# Patient Record
Sex: MALE | Race: BLACK OR AFRICAN AMERICAN | NOT HISPANIC OR LATINO | Employment: FULL TIME | URBAN - METROPOLITAN AREA
[De-identification: names, ages, dates, MRNs, and addresses within clinical notes are randomized per-mention and may not be internally consistent; named-entity substitution may affect disease eponyms.]

---

## 2017-06-14 ENCOUNTER — HOSPITAL ENCOUNTER (EMERGENCY)
Facility: HOSPITAL | Age: 62
Discharge: HOME | End: 2017-06-14
Attending: EMERGENCY MEDICINE

## 2017-06-14 VITALS
BODY MASS INDEX: 32.7 KG/M2 | WEIGHT: 263 LBS | TEMPERATURE: 98 F | HEIGHT: 75 IN | OXYGEN SATURATION: 98 % | HEART RATE: 67 BPM | DIASTOLIC BLOOD PRESSURE: 83 MMHG | RESPIRATION RATE: 20 BRPM | SYSTOLIC BLOOD PRESSURE: 138 MMHG

## 2017-06-14 DIAGNOSIS — K12.2 UVULITIS: Primary | ICD-10-CM

## 2017-06-14 PROCEDURE — 96375 TX/PRO/DX INJ NEW DRUG ADDON: CPT

## 2017-06-14 PROCEDURE — 96365 THER/PROPH/DIAG IV INF INIT: CPT

## 2017-06-14 PROCEDURE — 25000003 PHARM REV CODE 250: Performed by: EMERGENCY MEDICINE

## 2017-06-14 PROCEDURE — 99284 EMERGENCY DEPT VISIT MOD MDM: CPT | Mod: 25

## 2017-06-14 PROCEDURE — 63600175 PHARM REV CODE 636 W HCPCS: Performed by: EMERGENCY MEDICINE

## 2017-06-14 RX ORDER — HYDROCHLOROTHIAZIDE 25 MG/1
25 TABLET ORAL DAILY
Qty: 30 TABLET | Refills: 0 | Status: SHIPPED | OUTPATIENT
Start: 2017-06-14 | End: 2018-11-25

## 2017-06-14 RX ORDER — CEPHALEXIN 250 MG/1
250 CAPSULE ORAL 4 TIMES DAILY
Qty: 28 CAPSULE | Refills: 0 | Status: SHIPPED | OUTPATIENT
Start: 2017-06-14 | End: 2017-06-21

## 2017-06-14 RX ORDER — CEFTRIAXONE 1 G/50ML
1 INJECTION, SOLUTION INTRAVENOUS
Status: COMPLETED | OUTPATIENT
Start: 2017-06-14 | End: 2017-06-14

## 2017-06-14 RX ORDER — DIPHENHYDRAMINE HYDROCHLORIDE 50 MG/ML
50 INJECTION INTRAMUSCULAR; INTRAVENOUS
Status: COMPLETED | OUTPATIENT
Start: 2017-06-14 | End: 2017-06-14

## 2017-06-14 RX ORDER — PREDNISONE 20 MG/1
20 TABLET ORAL DAILY
Qty: 5 TABLET | Refills: 0 | Status: SHIPPED | OUTPATIENT
Start: 2017-06-14 | End: 2017-06-19

## 2017-06-14 RX ORDER — DIPHENHYDRAMINE HCL 25 MG
25 CAPSULE ORAL EVERY 6 HOURS PRN
Qty: 12 EACH | Refills: 0 | Status: SHIPPED | OUTPATIENT
Start: 2017-06-14 | End: 2017-06-17

## 2017-06-14 RX ORDER — LOVASTATIN 20 MG/1
20 TABLET ORAL NIGHTLY
COMMUNITY
End: 2022-07-26 | Stop reason: CLARIF

## 2017-06-14 RX ORDER — AMLODIPINE BESYLATE 10 MG/1
10 TABLET ORAL DAILY
COMMUNITY
End: 2022-07-26 | Stop reason: CLARIF

## 2017-06-14 RX ORDER — LISINOPRIL AND HYDROCHLOROTHIAZIDE 20; 25 MG/1; MG/1
1 TABLET ORAL DAILY
COMMUNITY
End: 2017-06-14 | Stop reason: ALTCHOICE

## 2017-06-14 RX ORDER — METHYLPREDNISOLONE SOD SUCC 125 MG
125 VIAL (EA) INJECTION
Status: COMPLETED | OUTPATIENT
Start: 2017-06-14 | End: 2017-06-14

## 2017-06-14 RX ORDER — FAMOTIDINE 10 MG/ML
20 INJECTION INTRAVENOUS
Status: COMPLETED | OUTPATIENT
Start: 2017-06-14 | End: 2017-06-14

## 2017-06-14 RX ADMIN — FAMOTIDINE 20 MG: 10 INJECTION, SOLUTION INTRAVENOUS at 02:06

## 2017-06-14 RX ADMIN — DIPHENHYDRAMINE HYDROCHLORIDE 50 MG: 50 INJECTION, SOLUTION INTRAMUSCULAR; INTRAVENOUS at 02:06

## 2017-06-14 RX ADMIN — METHYLPREDNISOLONE SODIUM SUCCINATE 125 MG: 125 INJECTION, POWDER, FOR SOLUTION INTRAMUSCULAR; INTRAVENOUS at 02:06

## 2017-06-14 RX ADMIN — CEFTRIAXONE 1 G: 1 INJECTION, SOLUTION INTRAVENOUS at 03:06

## 2017-06-14 NOTE — ED NOTES
Patient identifiers for Willi Diaz checked and correct.  LOC: Patient is awake, alert, and aware of environment with an appropriate affect. Patient is oriented x 3 and speaking appropriately. Pt c/o swelling to throat with difficulty swallowing. Pt reported s/s started today. Pt reports he takes lisinopril.   APPEARANCE: Patient resting comfortably and in no acute distress. Patient is clean and well groomed, patient's clothing is properly fastened.  SKIN: The skin is warm and dry. Patient has normal skin turgor and moist mucus membrances. Skin is intact; no bruising or breakdown noted.  MUSCULOSKELETAL: Patient is moving all extremities well, no obvious deformities noted. Pulses intact.   RESPIRATORY: Airway is open and patent. Respirations are spontaneous and non-labored with normal effort and rate. Pt placed on continuous pulse ox.  CARDIAC: Patient has a normal rate and rhythm. No peripheral edema noted. Capillary refill < 3 seconds. Pt placed on continuous cardiac monitor.  ABDOMEN: No distention noted. Bowel sounds active in all 4 quadrants. Soft and non-tender upon palpation.  NEUROLOGICAL: PERRL. Facial expression is symmetrical. Hand grasps are equal bilaterally. Normal sensation in all extremities when touched with finger.  Allergies reported:   Review of patient's allergies indicates:   Allergen Reactions    Pcn [penicillins] Itching and Swelling     Bed locked, lowest position. Call light within easy reach. Side rails up x2.

## 2017-06-14 NOTE — DISCHARGE INSTRUCTIONS
It is unclear if you have an allergy to your lisinopril called ACE - Inhibitor Angioedema or just infection of the uvula call uvulitis.  You need to stop your lisinopril and follow up with your regular doctor in the next few days for further adjustment of your blood pressure medicine.

## 2017-06-14 NOTE — ED PROVIDER NOTES
Encounter Date: 6/14/2017       History     Chief Complaint   Patient presents with    Sore Throat     pt c/o sore throat and swelling that began yesterday      Review of patient's allergies indicates:   Allergen Reactions    Pcn [penicillins] Itching and Swelling     Patient is a 61-year-old male with a past medical history of hypertension who presents to emergency department for evaluation of a mild sore throat swelling the posterior throat.  He feels like his uvula is swollen.  He has no difficulty breathing or difficulty swallowing.  He has no facial swelling or tongue swelling.  He does take an ACE inhibitor but has no history of ACE inhibitor angioedema.  He has no diffuse rash itching wheezing cough shortness of breath nausea vomiting or abdominal pain.          Past Medical History:   Diagnosis Date    Hypertension      History reviewed. No pertinent surgical history.  History reviewed. No pertinent family history.  Social History   Substance Use Topics    Smoking status: Never Smoker    Smokeless tobacco: Not on file    Alcohol use 18.0 oz/week     30 Cans of beer per week     Review of Systems   Constitutional: Negative for fatigue and fever.   HENT: Positive for sore throat. Negative for postnasal drip, rhinorrhea, sinus pressure, sneezing, tinnitus, trouble swallowing and voice change.    Respiratory: Negative for shortness of breath and wheezing.    Cardiovascular: Negative for chest pain.   Gastrointestinal: Negative for abdominal pain, nausea and rectal pain.   Genitourinary: Negative for dysuria and flank pain.   Neurological: Negative for weakness, numbness and headaches.       Physical Exam     Initial Vitals [06/14/17 0123]   BP Pulse Resp Temp SpO2   (!) 155/84 78 16 97.9 °F (36.6 °C) 98 %     Physical Exam    Nursing note and vitals reviewed.  Constitutional: He appears well-developed and well-nourished. No distress.   HENT:   Head: Normocephalic and atraumatic.   Right Ear: External ear  normal.   Left Ear: External ear normal.   Uvula is erythematous and slightly swollen.  The soft pillars are slightly erythematous and swollen.   Eyes: Conjunctivae and EOM are normal. Pupils are equal, round, and reactive to light.   Neck: Normal range of motion. Neck supple.   Normal egophony.  No meningismus.   Cardiovascular: Normal rate, regular rhythm and normal heart sounds. Exam reveals no gallop and no friction rub.    No murmur heard.  Pulmonary/Chest: Breath sounds normal. No stridor. He has no wheezes. He has no rhonchi. He has no rales.   Abdominal: Soft. He exhibits no distension. There is no tenderness. There is no rebound.   Neurological: He is alert and oriented to person, place, and time. He has normal strength.   Skin: Capillary refill takes less than 2 seconds. No rash noted.   Psychiatric: He has a normal mood and affect.         ED Course   Procedures  Labs Reviewed - No data to display          Medical Decision Making:   I think this is uvulitis because the patient has an erythematous swollen uvula w/ mild sore throat.  I gave the patient Benadryl and prednisone as well as Rocephin.  I will start him on Keflex.    However, the patient is on lisinopril hydrochlorothiazide and I believe he will need to stop the lisinopril in case this is ACE inhibitor angioedema.  His symptoms improved while he was in the emergency department and I feel that he is stable for discharge.  Follow-up with his primary care physician.                   ED Course     Clinical Impression:   The encounter diagnosis was Uvulitis.          Russell Blackmon MD  06/14/17 0898

## 2018-11-25 ENCOUNTER — HOSPITAL ENCOUNTER (EMERGENCY)
Facility: HOSPITAL | Age: 63
Discharge: HOME | End: 2018-11-25
Attending: EMERGENCY MEDICINE
Payer: COMMERCIAL

## 2018-11-25 VITALS
OXYGEN SATURATION: 98 % | SYSTOLIC BLOOD PRESSURE: 130 MMHG | HEART RATE: 82 BPM | WEIGHT: 265 LBS | HEIGHT: 75 IN | RESPIRATION RATE: 16 BRPM | DIASTOLIC BLOOD PRESSURE: 68 MMHG | BODY MASS INDEX: 32.95 KG/M2 | TEMPERATURE: 99 F

## 2018-11-25 DIAGNOSIS — M79.89 SWELLING OF RIGHT FOOT: ICD-10-CM

## 2018-11-25 PROCEDURE — 99284 EMERGENCY DEPT VISIT MOD MDM: CPT | Mod: 25

## 2018-11-25 RX ORDER — HYDROCODONE BITARTRATE AND ACETAMINOPHEN 5; 325 MG/1; MG/1
1 TABLET ORAL EVERY 6 HOURS PRN
Qty: 8 TABLET | Refills: 0 | Status: SHIPPED | OUTPATIENT
Start: 2018-11-25 | End: 2022-07-26

## 2018-11-25 RX ORDER — LOSARTAN POTASSIUM AND HYDROCHLOROTHIAZIDE 12.5; 5 MG/1; MG/1
1 TABLET ORAL DAILY
COMMUNITY

## 2018-11-25 NOTE — ED PROVIDER NOTES
"Encounter Date: 11/25/2018    SCRIBE #1 NOTE: I, Cyndi Fitch, am scribing for, and in the presence of, Dr. Johnny Lehman MD.       History     Chief Complaint   Patient presents with    rt. foot pain / swelling     x 5 days / swelling and pain / denies injury        Time seen by provider: 8:14 AM on 11/25/2018    Willi Diaz is a 63 y.o. male with PMHx of HTN who presents to the ED with complaints of bilateral foot swelling and right foot pain for the past x5 days. Pain "comes and goes" and has worsened since yesterday. Patient has been taking Advil with little to no relief to pain. He denies any recent injuries or trauma. Patient mentions being diagnosed with gout in his right knee in the past. He states fluid was unable to be found at the time of draining. Patient currently has no other medical concerns or complaints at this moment. He denies onset of any other new symptoms. No pertinent SHx on file. Penicillin allergy noted.       The history is provided by the patient.     Review of patient's allergies indicates:   Allergen Reactions    Pcn [penicillins] Itching and Swelling     Past Medical History:   Diagnosis Date    Hypertension      History reviewed. No pertinent surgical history.  History reviewed. No pertinent family history.  Social History     Tobacco Use    Smoking status: Never Smoker   Substance Use Topics    Alcohol use: Yes     Alcohol/week: 18.0 oz     Types: 30 Cans of beer per week    Drug use: Not on file     Review of Systems   Constitutional: Negative for fever.   HENT: Negative for congestion and sinus pressure.    Respiratory: Negative for cough.    Cardiovascular: Negative for chest pain.   Gastrointestinal: Negative for nausea and vomiting.   Musculoskeletal: Negative for back pain, gait problem and neck pain.        + right foot pain  + bilateral foot swelling   Skin: Negative for color change, rash and wound.   Neurological: Negative for dizziness, weakness and " numbness.   Hematological: Does not bruise/bleed easily.   Psychiatric/Behavioral: The patient is not nervous/anxious.        Physical Exam     Initial Vitals [11/25/18 0806]   BP Pulse Resp Temp SpO2   130/68 82 16 98.5 °F (36.9 °C) 98 %      MAP       --         Physical Exam    Nursing note and vitals reviewed.  Constitutional: He appears well-developed and well-nourished. He is not diaphoretic. No distress.   HENT:   Head: Normocephalic and atraumatic.   Mouth/Throat: Oropharynx is clear and moist.   Eyes: Conjunctivae are normal.   Neck: Neck supple.   Cardiovascular: Normal rate, regular rhythm, normal heart sounds and intact distal pulses. Exam reveals no gallop and no friction rub.    No murmur heard.  Pulses:       Dorsalis pedis pulses are 2+ on the right side, and 2+ on the left side.        Posterior tibial pulses are 2+ on the right side, and 2+ on the left side.   Pulmonary/Chest: Breath sounds normal. No respiratory distress.   Musculoskeletal: Normal range of motion. He exhibits edema and tenderness.        Right foot: There is tenderness and swelling. There is normal range of motion.        Left foot: There is swelling. There is normal range of motion and no tenderness.   + trace pitting edema. 2+ pedal pulses. Some leg asymmetry, right greater than left. 5/5 strength in BLE. No calf pain noted. Minimal TTP on dorsum of right foot. No increased warmth or erythema noted. No foreign body evident to BLE. No pain to planar aspect of feet. Full ROM with flexion and extension.    Neurological: He is alert and oriented to person, place, and time. He has normal strength. No sensory deficit.   Skin: No rash noted. No erythema.         ED Course   Procedures  Labs Reviewed - No data to display       Imaging Results          US Lower Extremity Veins Right (Final result)  Result time 11/25/18 09:58:30    Final result by Johnny Landry MD (11/25/18 09:58:30)                 Impression:      No evidence of  deep venous thrombosis in the right lower extremity.      Electronically signed by: Johnny Landry  Date:    11/25/2018  Time:    09:58             Narrative:    EXAMINATION:  US LOWER EXTREMITY VEINS RIGHT    CLINICAL HISTORY:  Leg pain;    TECHNIQUE:  Duplex and color flow Doppler evaluation and graded compression of the right lower extremity veins was performed.    COMPARISON:  None    FINDINGS:  Right thigh veins: The common femoral, femoral, popliteal, upper greater saphenous, and deep femoral veins are patent and free of thrombus. The veins are normally compressible and have normal phasic flow and augmentation response.    Right calf veins: The visualized calf veins are patent.    Contralateral CFV: The contralateral (left) common femoral vein is patent and free of thrombus.    Miscellaneous: Nonspecific subcutaneous soft tissue edema in the right calf.                               X-Ray Foot Complete Right (Final result)  Result time 11/25/18 10:00:59    Final result by Johnny Landry MD (11/25/18 10:00:59)                 Narrative:    EXAMINATION:  XR FOOT COMPLETE 3 VIEW RIGHT    CLINICAL HISTORY:  . Other specified soft tissue disorders    TECHNIQUE:  AP, lateral, and oblique views of the right foot were performed.    COMPARISON:  None    FINDINGS:  No acute fracture.  Hallux valgus.  Plantar and dorsal calcaneal spurs.  Mild degenerative changes 1st MTP and midfoot.  No radiopaque retained foreign body.  Mild dorsal foot soft tissue swelling.      Electronically signed by: Johnny Landry  Date:    11/25/2018  Time:    10:00                               Medical Decision Making:   History:   Old Medical Records: I decided to obtain old medical records.  Clinical Tests:   Radiological Study: Ordered and Reviewed            Scribe Attestation:   Scribe #1: I performed the above scribed service and the documentation accurately describes the services I performed. I attest to the accuracy of the  note.      I, Dr. Johnny Lehman, personally performed the services described in this documentation. All medical record entries made by the scribe were at my direction and in my presence.  I have reviewed the chart and agree that the record reflects my personal performance and is accurate and complete. Johnny Lehman MD.  9:43 AM 11/25/2018    Willi Diaz is a 63 y.o. male presenting with atraumatic edema and pain to the right foot.  Edema and pain is mostly concentrated on the dorsal aspect with swelling without pain noted to the ipsilateral leg.  There is notably no calf or thigh tenderness, palpable venous cords, or known risk factors for DVT.  Overall he is low risk for DVT with ultrasound performed to further rule this out.  No sign of acute arterial compromise.  No trauma to suggest fracture with x-ray performed without acute process evident.  Possibility of some form of arthropathy including gout discussed although pain cannot be identified to any single joint.  I have very low suspicion for septic joint.  I do not think emergent orthopedic intervention is indicated.  He is appropriate for outpatient podiatry follow-up and may take NSAIDs or acetaminophen as necessary.  Return precautions reviewed.             Clinical Impression:   The encounter diagnosis was Swelling of right foot.      Disposition:   Disposition: Discharged  Condition: Stable                        Johnny Lehman MD  11/25/18 [id number]

## 2020-01-27 RX ORDER — METFORMIN HYDROCHLORIDE 500 MG/1
TABLET ORAL
Qty: 180 TABLET | Refills: 0 | OUTPATIENT
Start: 2020-01-27

## 2020-06-25 DIAGNOSIS — M25.511 RIGHT SHOULDER PAIN: Primary | ICD-10-CM

## 2020-06-25 DIAGNOSIS — M25.319 SHOULDER INSTABILITY: ICD-10-CM

## 2020-07-09 ENCOUNTER — HOSPITAL ENCOUNTER (OUTPATIENT)
Dept: RADIOLOGY | Facility: HOSPITAL | Age: 65
Discharge: HOME | End: 2020-07-09
Attending: FAMILY MEDICINE
Payer: COMMERCIAL

## 2020-07-09 DIAGNOSIS — M25.319 SHOULDER INSTABILITY: ICD-10-CM

## 2020-07-09 DIAGNOSIS — M25.511 RIGHT SHOULDER PAIN: ICD-10-CM

## 2020-07-09 PROCEDURE — 73030 X-RAY EXAM OF SHOULDER: CPT | Mod: TC,50,PO

## 2020-11-02 DIAGNOSIS — M79.641 RIGHT HAND PAIN: Primary | ICD-10-CM

## 2020-11-04 ENCOUNTER — HOSPITAL ENCOUNTER (OUTPATIENT)
Dept: RADIOLOGY | Facility: HOSPITAL | Age: 65
Discharge: HOME | End: 2020-11-04
Attending: NURSE PRACTITIONER
Payer: COMMERCIAL

## 2020-11-04 DIAGNOSIS — M79.641 RIGHT HAND PAIN: ICD-10-CM

## 2020-11-04 PROCEDURE — 73130 X-RAY EXAM OF HAND: CPT | Mod: TC,PO,RT

## 2020-11-29 ENCOUNTER — OFFICE VISIT (OUTPATIENT)
Dept: URGENT CARE | Facility: CLINIC | Age: 65
End: 2020-11-29
Payer: COMMERCIAL

## 2020-11-29 VITALS
HEART RATE: 91 BPM | WEIGHT: 265 LBS | HEIGHT: 75 IN | BODY MASS INDEX: 32.95 KG/M2 | DIASTOLIC BLOOD PRESSURE: 91 MMHG | TEMPERATURE: 98 F | RESPIRATION RATE: 16 BRPM | OXYGEN SATURATION: 95 % | SYSTOLIC BLOOD PRESSURE: 138 MMHG

## 2020-11-29 DIAGNOSIS — M25.562 ACUTE PAIN OF LEFT KNEE: Primary | ICD-10-CM

## 2020-11-29 DIAGNOSIS — M25.462 EFFUSION OF LEFT KNEE: ICD-10-CM

## 2020-11-29 PROCEDURE — 73564 X-RAY EXAM KNEE 4 OR MORE: CPT | Mod: LT,S$GLB,, | Performed by: NURSE PRACTITIONER

## 2020-11-29 PROCEDURE — 73564 PR  X-RAY KNEE 4+ VIEW: ICD-10-PCS | Mod: LT,S$GLB,, | Performed by: NURSE PRACTITIONER

## 2020-11-29 PROCEDURE — 99204 PR OFFICE/OUTPT VISIT, NEW, LEVL IV, 45-59 MIN: ICD-10-PCS | Mod: S$GLB,,, | Performed by: NURSE PRACTITIONER

## 2020-11-29 PROCEDURE — 99204 OFFICE O/P NEW MOD 45 MIN: CPT | Mod: S$GLB,,, | Performed by: NURSE PRACTITIONER

## 2020-11-29 RX ORDER — DILTIAZEM HYDROCHLORIDE 180 MG/1
180 CAPSULE, COATED, EXTENDED RELEASE ORAL DAILY
COMMUNITY
Start: 2020-09-15

## 2020-11-29 RX ORDER — IBUPROFEN 800 MG/1
800 TABLET ORAL EVERY 8 HOURS PRN
Qty: 30 TABLET | Refills: 0 | Status: SHIPPED | OUTPATIENT
Start: 2020-11-29 | End: 2020-12-09

## 2020-11-29 RX ORDER — METFORMIN HYDROCHLORIDE 1000 MG/1
TABLET ORAL
COMMUNITY
Start: 2020-03-05

## 2020-11-29 RX ORDER — GABAPENTIN 600 MG/1
600 TABLET ORAL 3 TIMES DAILY
COMMUNITY
Start: 2020-08-03

## 2020-11-29 NOTE — PATIENT INSTRUCTIONS

## 2020-11-29 NOTE — PROGRESS NOTES
"Subjective:       Patient ID: Willi Diaz is a 65 y.o. male.    Vitals:  height is 6' 3" (1.905 m) and weight is 120.2 kg (265 lb). His temperature is 98.2 °F (36.8 °C). His blood pressure is 138/91 (abnormal) and his pulse is 91. His respiration is 16 and oxygen saturation is 95%.     Chief Complaint: Knee Pain (left knee)    Patient complains of left knee pain X 4 days. States it hurts to change positions. Swelling. Tender to touch.   Been using Biofreeze and advil. Reports he fell on his left knee 2 weeks ago.     Knee Pain         Constitution: Negative for chills, fatigue and fever.   HENT: Negative for congestion and sore throat.    Neck: Negative for painful lymph nodes.   Cardiovascular: Negative for chest pain and leg swelling.   Eyes: Negative for double vision and blurred vision.   Respiratory: Negative for cough and shortness of breath.    Gastrointestinal: Negative for nausea, vomiting and diarrhea.   Genitourinary: Negative for dysuria, frequency and urgency.   Musculoskeletal: Negative for joint pain, joint swelling, muscle cramps and muscle ache.        Left knee pain   Skin: Negative for color change, pale and rash.   Allergic/Immunologic: Negative for seasonal allergies.   Neurological: Negative for dizziness, history of vertigo, light-headedness, passing out and headaches.   Hematologic/Lymphatic: Negative for swollen lymph nodes, easy bruising/bleeding and history of blood clots. Does not bruise/bleed easily.   Psychiatric/Behavioral: Negative for nervous/anxious, sleep disturbance and depression. The patient is not nervous/anxious.        Objective:       Physical Exam   Constitutional: He is oriented to person, place, and time. He appears well-developed. He is cooperative.  Non-toxic appearance. He does not appear ill. No distress.   HENT:   Head: Normocephalic and atraumatic.   Ears:   Right Ear: Hearing, tympanic membrane, external ear and ear canal normal.   Left Ear: Hearing, tympanic " membrane, external ear and ear canal normal.   Nose: Nose normal. No mucosal edema, rhinorrhea or nasal deformity. No epistaxis. Right sinus exhibits no maxillary sinus tenderness and no frontal sinus tenderness. Left sinus exhibits no maxillary sinus tenderness and no frontal sinus tenderness.   Mouth/Throat: Uvula is midline, oropharynx is clear and moist and mucous membranes are normal. No trismus in the jaw. Normal dentition. No uvula swelling. No posterior oropharyngeal erythema.   Eyes: Conjunctivae and lids are normal. Right eye exhibits no discharge. Left eye exhibits no discharge. No scleral icterus.   Neck: Trachea normal, normal range of motion, full passive range of motion without pain and phonation normal. Neck supple.   Cardiovascular: Normal rate, regular rhythm, normal heart sounds and normal pulses.   Pulmonary/Chest: Effort normal and breath sounds normal. No respiratory distress.   Abdominal: Soft. Normal appearance and bowel sounds are normal. He exhibits no distension, no pulsatile midline mass and no mass. There is no abdominal tenderness.   Musculoskeletal: Normal range of motion.         General: No deformity.      Left knee: He exhibits swelling and effusion. He exhibits normal range of motion, no ecchymosis, no deformity, no laceration, no erythema, normal alignment and no LCL laxity. Tenderness found. Medial joint line tenderness noted.   Neurological: He is alert and oriented to person, place, and time. He exhibits normal muscle tone. Coordination normal. GCS eye subscore is 4. GCS verbal subscore is 5. GCS motor subscore is 6.   Skin: Skin is warm, dry, intact, not diaphoretic and not pale. not left kneePsychiatric: His speech is normal and behavior is normal. Judgment and thought content normal.   Nursing note and vitals reviewed.        Assessment:       1. Acute pain of left knee    2. Effusion of left knee        Plan:       Xr was negative. Discussed the importance of R.I.C.E. Ace  wrap applied and NSAIDs given for pain and inflammation.  Discussed reasons to return and importance of followup. All questions addressed and patient given discharge instructions and followup information.    Acute pain of left knee  -     HME - OTHER    Effusion of left knee  -     HME - OTHER    Other orders  -     ibuprofen (ADVIL,MOTRIN) 800 MG tablet; Take 1 tablet (800 mg total) by mouth every 8 (eight) hours as needed for Pain.  Dispense: 30 tablet; Refill: 0

## 2020-12-01 ENCOUNTER — OFFICE VISIT (OUTPATIENT)
Dept: ORTHOPEDICS | Facility: CLINIC | Age: 65
End: 2020-12-01
Payer: COMMERCIAL

## 2020-12-01 VITALS — RESPIRATION RATE: 16 BRPM | HEIGHT: 75 IN | BODY MASS INDEX: 32.95 KG/M2 | WEIGHT: 265 LBS

## 2020-12-01 DIAGNOSIS — M17.0 BILATERAL PRIMARY OSTEOARTHRITIS OF KNEE: Primary | ICD-10-CM

## 2020-12-01 DIAGNOSIS — M18.11 ARTHRITIS OF CARPOMETACARPAL (CMC) JOINT OF RIGHT THUMB: ICD-10-CM

## 2020-12-01 DIAGNOSIS — M19.011 LOCALIZED PRIMARY OSTEOARTHRITIS OF SHOULDER REGIONS, BILATERAL: ICD-10-CM

## 2020-12-01 DIAGNOSIS — M19.012 LOCALIZED PRIMARY OSTEOARTHRITIS OF SHOULDER REGIONS, BILATERAL: ICD-10-CM

## 2020-12-01 PROCEDURE — 20610 LARGE JOINT ASPIRATION/INJECTION: L KNEE: ICD-10-PCS | Mod: LT,S$GLB,, | Performed by: ORTHOPAEDIC SURGERY

## 2020-12-01 PROCEDURE — 99204 OFFICE O/P NEW MOD 45 MIN: CPT | Mod: 25,S$GLB,, | Performed by: ORTHOPAEDIC SURGERY

## 2020-12-01 PROCEDURE — 99999 PR PBB SHADOW E&M-EST. PATIENT-LVL III: CPT | Mod: PBBFAC,,, | Performed by: ORTHOPAEDIC SURGERY

## 2020-12-01 PROCEDURE — 20610 DRAIN/INJ JOINT/BURSA W/O US: CPT | Mod: LT,S$GLB,, | Performed by: ORTHOPAEDIC SURGERY

## 2020-12-01 PROCEDURE — 99204 PR OFFICE/OUTPT VISIT, NEW, LEVL IV, 45-59 MIN: ICD-10-PCS | Mod: 25,S$GLB,, | Performed by: ORTHOPAEDIC SURGERY

## 2020-12-01 PROCEDURE — 99999 PR PBB SHADOW E&M-EST. PATIENT-LVL III: ICD-10-PCS | Mod: PBBFAC,,, | Performed by: ORTHOPAEDIC SURGERY

## 2020-12-01 RX ORDER — METHYLPREDNISOLONE ACETATE 40 MG/ML
40 INJECTION, SUSPENSION INTRA-ARTICULAR; INTRALESIONAL; INTRAMUSCULAR; SOFT TISSUE
Status: DISCONTINUED | OUTPATIENT
Start: 2020-12-01 | End: 2020-12-01 | Stop reason: HOSPADM

## 2020-12-01 RX ORDER — DICLOFENAC SODIUM 10 MG/G
GEL TOPICAL
COMMUNITY
Start: 2020-11-26 | End: 2022-07-26 | Stop reason: CLARIF

## 2020-12-01 RX ORDER — MELOXICAM 15 MG/1
15 TABLET ORAL DAILY
Qty: 30 TABLET | Refills: 11 | Status: SHIPPED | OUTPATIENT
Start: 2020-12-01 | End: 2021-11-10

## 2020-12-01 RX ADMIN — METHYLPREDNISOLONE ACETATE 40 MG: 40 INJECTION, SUSPENSION INTRA-ARTICULAR; INTRALESIONAL; INTRAMUSCULAR; SOFT TISSUE at 11:12

## 2020-12-01 NOTE — PROCEDURES
Large Joint Aspiration/Injection: L knee    Date/Time: 12/1/2020 11:00 AM  Performed by: Lev Charles II, MD  Authorized by: Lev Charles II, MD     Consent Done?:  Yes (Verbal)  Indications:  Pain  Timeout: prior to procedure the correct patient, procedure, and site was verified    Prep: patient was prepped and draped in usual sterile fashion      Local anesthesia used?: Yes    Local anesthetic:  Topical anesthetic    Details:  Needle Size:  22 G  Approach:  Anterolateral  Location:  Knee  Site:  L knee  Medications:  40 mg methylPREDNISolone acetate 40 mg/mL  Patient tolerance:  Patient tolerated the procedure well with no immediate complications

## 2020-12-01 NOTE — PROGRESS NOTES
CC:  65-year-old male presents for evaluation of left knee pain.  Patient has been having pain in his left knee for several weeks now.  He has been using Biofreeze and Advil without relief.  He has had no other treatment for this problem.  He also complains of pain in his right knee, and both of his shoulders, and in his right hand.    ROS:    Constitution: Denies chills, fever, and sweats.  HENT: Denies headaches or blurry vision.  Cardiovascular: Denies chest pain or irregular heart beat.  Respiratory: Denies cough or shortness of breath.  Gastrointestinal: Denies abdominal pain, nausea, or vomiting.  Genitourinary:  Denies urinary incontinence, bladder and kidney issues  Musculoskeletal:  Denies muscle cramps.  Positive for left knee pain, right knee pain, bilateral shoulder pain, and right hand pain.  Neurological: Denies dizziness or focal weakness.  Psychiatric/Behavioral: Normal mental status.  Hematologic/Lymphatic: Denies bleeding problem or easy bruising/bleeding.  Skin: Denies rash or suspicious lesions.    Physical examination     Gen - No acute distress, well nourished, well groomed   Eyes - Extraoccular motions intact, pupils equally round and reactive to light and accommodation   ENT - normocephalic, atruamtic, oropharynx clear   Neck - Supple, no abnormal masses   Cardiovascular - regular rate and rhythm   Pulmonary - clear to auscultation bilaterally, no wheezes, ronchi, or rales   Abdomen - soft, non-tender, non-distended, positive bowel sounds   Psych - The patient is alert and oriented x3 with normal mood and affect    Examination of the Left Lower Extremity:     Motor function is intact distally EHL/FHL/TA/tisha   +2 dorsalis pedis and posterior tibial pulses   Sensation to light touch intact distally dorsal, plantar, and first web space     Examination of the Left knee:    ROM 0 - 150   Effusion positive  Tenderness to palpation at the joint line positive  Pain during range of motion  positive  Crepitation during range of motion positive     positive increased pain noted with flexion past 90   positive antalgic gait noted   negative Lachman's Test   negative Anterior Drawer Test   negative Posterior Drawer Test   negative McMurrays Test   positive Disco Test   negative Varus/Valgus instability    X-ray images were examined and personally interpreted by me.  Three views of the left knee on an outside disc dated 11/29/2020 show mild arthritis of the left knee with loss of joint space and subchondral sclerosis.  Images of the right knee incidentally seen on the x-rays show well-maintained joint space with no acute fractures.  X-rays of bilateral shoulders dated 07/09/2020 show osteoarthritis of both the right and left shoulder with loss of joint space, periarticular osteophytes, and subchondral sclerosis.  X-rays of the right hand dated 11/04/2020 show basal joint arthritis of the right thumb and no acute fractures and no other advanced arthritic changes in the right hand.    Dx:  Generalized osteoarthritis involving both shoulders, both knees and the basal joint of the right thumb.  Patient's left knee is most symptomatic joint today.    Plan:  Offered to inject patient in his left knee with steroid mixture.  He agreed we injected left knee with a mixture of 2, 2, 1.  He tolerated well.  For his generalized arthritis offered to start him on Mobic once a day.  We sent a prescription to his pharmacy.  He is going to follow up in 1 week to make sure he is getting relief.

## 2020-12-10 ENCOUNTER — OFFICE VISIT (OUTPATIENT)
Dept: ORTHOPEDICS | Facility: CLINIC | Age: 65
End: 2020-12-10
Payer: COMMERCIAL

## 2020-12-10 VITALS — RESPIRATION RATE: 16 BRPM | HEIGHT: 75 IN | WEIGHT: 265 LBS | BODY MASS INDEX: 32.95 KG/M2

## 2020-12-10 DIAGNOSIS — M17.11 PRIMARY OSTEOARTHRITIS OF RIGHT KNEE: Primary | ICD-10-CM

## 2020-12-10 PROCEDURE — 99999 PR PBB SHADOW E&M-EST. PATIENT-LVL III: ICD-10-PCS | Mod: PBBFAC,,, | Performed by: ORTHOPAEDIC SURGERY

## 2020-12-10 PROCEDURE — 20610 LARGE JOINT ASPIRATION/INJECTION: R KNEE: ICD-10-PCS | Mod: RT,S$GLB,, | Performed by: ORTHOPAEDIC SURGERY

## 2020-12-10 PROCEDURE — 99212 PR OFFICE/OUTPT VISIT, EST, LEVL II, 10-19 MIN: ICD-10-PCS | Mod: 25,S$GLB,, | Performed by: ORTHOPAEDIC SURGERY

## 2020-12-10 PROCEDURE — 99999 PR PBB SHADOW E&M-EST. PATIENT-LVL III: CPT | Mod: PBBFAC,,, | Performed by: ORTHOPAEDIC SURGERY

## 2020-12-10 PROCEDURE — 20610 DRAIN/INJ JOINT/BURSA W/O US: CPT | Mod: RT,S$GLB,, | Performed by: ORTHOPAEDIC SURGERY

## 2020-12-10 PROCEDURE — 99212 OFFICE O/P EST SF 10 MIN: CPT | Mod: 25,S$GLB,, | Performed by: ORTHOPAEDIC SURGERY

## 2020-12-10 RX ORDER — METHYLPREDNISOLONE ACETATE 40 MG/ML
40 INJECTION, SUSPENSION INTRA-ARTICULAR; INTRALESIONAL; INTRAMUSCULAR; SOFT TISSUE
Status: DISCONTINUED | OUTPATIENT
Start: 2020-12-10 | End: 2020-12-10 | Stop reason: HOSPADM

## 2020-12-10 RX ADMIN — METHYLPREDNISOLONE ACETATE 40 MG: 40 INJECTION, SUSPENSION INTRA-ARTICULAR; INTRALESIONAL; INTRAMUSCULAR; SOFT TISSUE at 08:12

## 2020-12-10 NOTE — PROCEDURES
Large Joint Aspiration/Injection: R knee    Date/Time: 12/10/2020 8:15 AM  Performed by: Lev Charles II, MD  Authorized by: Lev Charles II, MD     Consent Done?:  Yes (Verbal)  Indications:  Pain  Timeout: prior to procedure the correct patient, procedure, and site was verified    Prep: patient was prepped and draped in usual sterile fashion      Local anesthesia used?: Yes    Local anesthetic:  Topical anesthetic    Details:  Needle Size:  22 G  Approach:  Anteromedial  Location:  Knee  Site:  R knee  Medications:  40 mg methylPREDNISolone acetate 40 mg/mL  Patient tolerance:  Patient tolerated the procedure well with no immediate complications

## 2020-12-10 NOTE — PROGRESS NOTES
CC:  65-year-old male follows up with osteoarthritis of multiple joints.  On his last visit we had injected his left knee because that was the most symptomatic with the patient also has generalized osteoarthritis involving both shoulders, both his knees, and the basal joint of his right thumb.  Today he would like to have his right knee evaluated.  He has osteoarthritis of the right knee as well and that is the most symptomatic problem is having today.  He got excellent relief with the injection in his left knee.    Examination of the Right Lower Extremity:     Motor function is intact distally EHL/FHL/TA/tisha   +2 dorsalis pedis and posterior tibial pulses   Sensation to light touch intact distally dorsal, plantar, and first web space     Examination of the Right knee:    ROM 0 - 150   Effusion positive  Tenderness to palpation at the joint line positive  Pain during range of motion positive  Crepitation during range of motion positive     positive increased pain noted with flexion past 90   positive antalgic gait noted   negative Lachman's Test   negative Anterior Drawer Test   negative Posterior Drawer Test   negative McMurrays Test   negative Disco Test   negative Varus/Valgus instability    Dx:  Osteoarthritis of the right knee    Plan:  We injected the right knee with a mixture of 2, 2, 1.  The patient tolerated that well.  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  The best treatment option for that would be a reverse shoulder replacement.  We gave the patient a handout from the [employer] on reverse shoulder replacement and we can discuss that in the future if he decides to proceed.

## 2021-05-26 DIAGNOSIS — E11.9 DIABETES MELLITUS WITHOUT COMPLICATION: Primary | ICD-10-CM

## 2021-06-01 ENCOUNTER — HOSPITAL ENCOUNTER (OUTPATIENT)
Dept: RADIOLOGY | Facility: HOSPITAL | Age: 66
Discharge: HOME | End: 2021-06-01
Attending: PODIATRIST
Payer: COMMERCIAL

## 2021-06-01 DIAGNOSIS — E11.9 DIABETES MELLITUS WITHOUT COMPLICATION: ICD-10-CM

## 2021-06-01 PROCEDURE — 73630 X-RAY EXAM OF FOOT: CPT | Mod: TC,50

## 2021-06-09 DIAGNOSIS — M54.50 LOW BACK PAIN: Primary | ICD-10-CM

## 2021-06-21 ENCOUNTER — HOSPITAL ENCOUNTER (OUTPATIENT)
Dept: RADIOLOGY | Facility: HOSPITAL | Age: 66
Discharge: HOME | End: 2021-06-21
Attending: NURSE PRACTITIONER
Payer: COMMERCIAL

## 2021-06-21 DIAGNOSIS — M54.50 LOW BACK PAIN: ICD-10-CM

## 2021-06-21 PROCEDURE — 72100 X-RAY EXAM L-S SPINE 2/3 VWS: CPT | Mod: TC,PO

## 2021-07-16 ENCOUNTER — OFFICE VISIT (OUTPATIENT)
Dept: URGENT CARE | Facility: CLINIC | Age: 66
End: 2021-07-16
Payer: COMMERCIAL

## 2021-07-16 VITALS
OXYGEN SATURATION: 98 % | WEIGHT: 260 LBS | BODY MASS INDEX: 32.33 KG/M2 | DIASTOLIC BLOOD PRESSURE: 97 MMHG | HEIGHT: 75 IN | RESPIRATION RATE: 16 BRPM | TEMPERATURE: 98 F | HEART RATE: 84 BPM | SYSTOLIC BLOOD PRESSURE: 173 MMHG

## 2021-07-16 DIAGNOSIS — Z20.822 EXPOSURE TO COVID-19 VIRUS: Primary | ICD-10-CM

## 2021-07-16 DIAGNOSIS — Z20.822 ENCOUNTER FOR LABORATORY TESTING FOR COVID-19 VIRUS: ICD-10-CM

## 2021-07-16 LAB — SARS-COV-2 RNA RESP QL NAA+PROBE: NORMAL

## 2021-07-16 PROCEDURE — U0003 INFECTIOUS AGENT DETECTION BY NUCLEIC ACID (DNA OR RNA); SEVERE ACUTE RESPIRATORY SYNDROME CORONAVIRUS 2 (SARS-COV-2) (CORONAVIRUS DISEASE [COVID-19]), AMPLIFIED PROBE TECHNIQUE, MAKING USE OF HIGH THROUGHPUT TECHNOLOGIES AS DESCRIBED BY CMS-2020-01-R: HCPCS | Performed by: NURSE PRACTITIONER

## 2021-07-16 PROCEDURE — U0005 INFEC AGEN DETEC AMPLI PROBE: HCPCS | Performed by: NURSE PRACTITIONER

## 2021-07-16 PROCEDURE — 99203 OFFICE O/P NEW LOW 30 MIN: CPT | Mod: S$GLB,,, | Performed by: NURSE PRACTITIONER

## 2021-07-16 PROCEDURE — 99203 PR OFFICE/OUTPT VISIT, NEW, LEVL III, 30-44 MIN: ICD-10-PCS | Mod: S$GLB,,, | Performed by: NURSE PRACTITIONER

## 2021-07-17 LAB
SARS-COV-2 RNA RESP QL NAA+PROBE: NOT DETECTED
SARS-COV-2- CYCLE NUMBER: -1

## 2021-07-29 ENCOUNTER — OFFICE VISIT (OUTPATIENT)
Dept: ORTHOPEDICS | Facility: CLINIC | Age: 66
End: 2021-07-29
Payer: COMMERCIAL

## 2021-07-29 ENCOUNTER — HOSPITAL ENCOUNTER (OUTPATIENT)
Dept: RADIOLOGY | Facility: HOSPITAL | Age: 66
Discharge: HOME | End: 2021-07-29
Attending: ORTHOPAEDIC SURGERY
Payer: COMMERCIAL

## 2021-07-29 VITALS — WEIGHT: 260 LBS | RESPIRATION RATE: 18 BRPM | HEIGHT: 75 IN | BODY MASS INDEX: 32.33 KG/M2

## 2021-07-29 DIAGNOSIS — M25.561 PAIN IN BOTH KNEES, UNSPECIFIED CHRONICITY: Primary | ICD-10-CM

## 2021-07-29 DIAGNOSIS — M25.562 PAIN IN BOTH KNEES, UNSPECIFIED CHRONICITY: Primary | ICD-10-CM

## 2021-07-29 DIAGNOSIS — M17.0 BILATERAL PRIMARY OSTEOARTHRITIS OF KNEE: Primary | ICD-10-CM

## 2021-07-29 DIAGNOSIS — M25.562 PAIN IN BOTH KNEES, UNSPECIFIED CHRONICITY: ICD-10-CM

## 2021-07-29 DIAGNOSIS — M25.561 PAIN IN BOTH KNEES, UNSPECIFIED CHRONICITY: ICD-10-CM

## 2021-07-29 PROCEDURE — 73564 X-RAY EXAM KNEE 4 OR MORE: CPT | Mod: TC,50,PN

## 2021-07-29 PROCEDURE — 73564 XR KNEE ORTHO BILAT WITH FLEXION: ICD-10-PCS | Mod: 26,50,, | Performed by: RADIOLOGY

## 2021-07-29 PROCEDURE — 20610 DRAIN/INJ JOINT/BURSA W/O US: CPT | Mod: 50,S$GLB,, | Performed by: ORTHOPAEDIC SURGERY

## 2021-07-29 PROCEDURE — 99999 PR PBB SHADOW E&M-EST. PATIENT-LVL III: CPT | Mod: PBBFAC,,, | Performed by: ORTHOPAEDIC SURGERY

## 2021-07-29 PROCEDURE — 73564 X-RAY EXAM KNEE 4 OR MORE: CPT | Mod: 26,50,, | Performed by: RADIOLOGY

## 2021-07-29 PROCEDURE — 20610 LARGE JOINT ASPIRATION/INJECTION: BILATERAL KNEE JOINT: ICD-10-PCS | Mod: 50,S$GLB,, | Performed by: ORTHOPAEDIC SURGERY

## 2021-07-29 PROCEDURE — 99213 OFFICE O/P EST LOW 20 MIN: CPT | Mod: 25,S$GLB,, | Performed by: ORTHOPAEDIC SURGERY

## 2021-07-29 PROCEDURE — 99213 PR OFFICE/OUTPT VISIT, EST, LEVL III, 20-29 MIN: ICD-10-PCS | Mod: 25,S$GLB,, | Performed by: ORTHOPAEDIC SURGERY

## 2021-07-29 PROCEDURE — 99999 PR PBB SHADOW E&M-EST. PATIENT-LVL III: ICD-10-PCS | Mod: PBBFAC,,, | Performed by: ORTHOPAEDIC SURGERY

## 2021-07-29 RX ORDER — METHYLPREDNISOLONE ACETATE 40 MG/ML
40 INJECTION, SUSPENSION INTRA-ARTICULAR; INTRALESIONAL; INTRAMUSCULAR; SOFT TISSUE
Status: DISCONTINUED | OUTPATIENT
Start: 2021-07-29 | End: 2021-07-29 | Stop reason: HOSPADM

## 2021-07-29 RX ADMIN — METHYLPREDNISOLONE ACETATE 40 MG: 40 INJECTION, SUSPENSION INTRA-ARTICULAR; INTRALESIONAL; INTRAMUSCULAR; SOFT TISSUE at 02:07

## 2022-01-29 ENCOUNTER — OFFICE VISIT (OUTPATIENT)
Dept: URGENT CARE | Facility: CLINIC | Age: 67
End: 2022-01-29
Payer: COMMERCIAL

## 2022-01-29 ENCOUNTER — PATIENT MESSAGE (OUTPATIENT)
Dept: ADMINISTRATIVE | Facility: CLINIC | Age: 67
End: 2022-01-29

## 2022-01-29 VITALS
SYSTOLIC BLOOD PRESSURE: 159 MMHG | OXYGEN SATURATION: 94 % | HEIGHT: 75 IN | WEIGHT: 260 LBS | HEART RATE: 86 BPM | DIASTOLIC BLOOD PRESSURE: 92 MMHG | BODY MASS INDEX: 32.33 KG/M2 | RESPIRATION RATE: 16 BRPM | TEMPERATURE: 98 F

## 2022-01-29 DIAGNOSIS — R09.81 NASAL SINUS CONGESTION: ICD-10-CM

## 2022-01-29 DIAGNOSIS — U07.1 COVID-19 VIRUS DETECTED: Primary | ICD-10-CM

## 2022-01-29 LAB
CTP QC/QA: YES
SARS-COV-2 AG RESP QL IA.RAPID: POSITIVE

## 2022-01-29 PROCEDURE — 99214 PR OFFICE/OUTPT VISIT, EST, LEVL IV, 30-39 MIN: ICD-10-PCS | Mod: S$GLB,,, | Performed by: NURSE PRACTITIONER

## 2022-01-29 PROCEDURE — 87811 SARS-COV-2 COVID19 W/OPTIC: CPT | Mod: S$GLB,,, | Performed by: NURSE PRACTITIONER

## 2022-01-29 PROCEDURE — 99214 OFFICE O/P EST MOD 30 MIN: CPT | Mod: S$GLB,,, | Performed by: NURSE PRACTITIONER

## 2022-01-29 PROCEDURE — 87811 SARS CORONAVIRUS 2 ANTIGEN POCT, MANUAL READ: ICD-10-PCS | Mod: S$GLB,,, | Performed by: NURSE PRACTITIONER

## 2022-01-29 RX ORDER — FLUTICASONE PROPIONATE 50 MCG
1 SPRAY, SUSPENSION (ML) NASAL DAILY
Qty: 15.8 ML | Refills: 0 | Status: SHIPPED | OUTPATIENT
Start: 2022-01-29 | End: 2022-07-26

## 2022-01-29 RX ORDER — ALBUTEROL SULFATE 90 UG/1
2 AEROSOL, METERED RESPIRATORY (INHALATION) EVERY 6 HOURS PRN
Qty: 18 G | Refills: 0 | Status: SHIPPED | OUTPATIENT
Start: 2022-01-29 | End: 2022-07-26 | Stop reason: CLARIF

## 2022-01-29 RX ORDER — ONDANSETRON 4 MG/1
4 TABLET, ORALLY DISINTEGRATING ORAL EVERY 8 HOURS PRN
Qty: 20 TABLET | Refills: 0 | Status: SHIPPED | OUTPATIENT
Start: 2022-01-29 | End: 2022-07-26 | Stop reason: CLARIF

## 2022-01-29 RX ORDER — PROMETHAZINE HYDROCHLORIDE AND DEXTROMETHORPHAN HYDROBROMIDE 6.25; 15 MG/5ML; MG/5ML
5 SYRUP ORAL EVERY 4 HOURS PRN
Qty: 118 ML | Refills: 0 | Status: SHIPPED | OUTPATIENT
Start: 2022-01-29 | End: 2022-02-08

## 2022-01-29 RX ORDER — CETIRIZINE HYDROCHLORIDE 10 MG/1
10 TABLET ORAL DAILY
Qty: 30 TABLET | Refills: 0 | Status: SHIPPED | OUTPATIENT
Start: 2022-01-29 | End: 2022-07-26

## 2022-01-29 RX ORDER — BENZONATATE 100 MG/1
100 CAPSULE ORAL 3 TIMES DAILY PRN
Qty: 30 CAPSULE | Refills: 0 | Status: SHIPPED | OUTPATIENT
Start: 2022-01-29 | End: 2022-02-08

## 2022-01-29 NOTE — PROGRESS NOTES
"Subjective:       Patient ID: Willi Diaz is a 66 y.o. male.    Vitals:  height is 6' 3" (1.905 m) and weight is 117.9 kg (260 lb). His oral temperature is 98.2 °F (36.8 °C). His blood pressure is 159/92 (abnormal) and his pulse is 86. His respiration is 16 and oxygen saturation is 94% (abnormal).     Chief Complaint: Sinus Problem    Patient states he is feeling bad, states his throat is kind of tingling. Congestion, states he feels like its a cold. X 1 day.       Constitution: Positive for chills and sweating. Negative for fatigue and fever.   HENT: Positive for congestion and sore throat. Negative for ear pain.    Cardiovascular: Negative for chest pain.   Respiratory: Positive for cough.    Gastrointestinal: Negative for abdominal pain, nausea, vomiting and diarrhea.       Objective:      Physical Exam   Constitutional: He is oriented to person, place, and time. He appears well-developed.  Non-toxic appearance. He does not appear ill. No distress.   HENT:   Head: Normocephalic and atraumatic.   Mouth/Throat: Oropharynx is clear and moist.   Eyes: Conjunctivae and EOM are normal.   Neck: Neck supple. No neck rigidity present.   Cardiovascular: Normal rate, regular rhythm and normal heart sounds.   Pulmonary/Chest: Effort normal and breath sounds normal. No respiratory distress.   Abdominal: Normal appearance.   Musculoskeletal: Normal range of motion.         General: Normal range of motion.      Cervical back: He exhibits no tenderness.   Lymphadenopathy:     He has no cervical adenopathy.   Neurological: no focal deficit. He is alert and oriented to person, place, and time.   Skin: Skin is warm, dry and not diaphoretic. Capillary refill takes 2 to 3 seconds.   Psychiatric: His behavior is normal. Mood normal.   Nursing note and vitals reviewed.        Assessment:       1. COVID-19 virus detected    2. Nasal sinus congestion        covid risk score of 3.  Plan:       Will treat with oral antiviral for risk " score of 3.  Discussed with patient.  Paxlovid contraindicated due to home meds.  COVID-19 virus detected    Nasal sinus congestion    Symptomatic treatment to include:    Rest, increase fluid intake to include electrolyte replacement  Ibuprofen/Tylenol as directed for fever, sore throat, body aches  Zrytec and flonase for sinus symptoms  Phenergan cough syrup at night for cough  Tessalon perles cough pills as needed day or night  Mucinex D over the counter as directed for sinus congestion.  Coricidin HBP if you have high blood pressure.  Zofran as directed for nausea/vomiting.  Warm, salt water gargles, over the counter throat lozenges or sprays as desires.   Imodium over the counter as directed for diarrhea.  ER for difficulty breathing not relieved by rest, excessive lethargy and/or change in mental status  Albuterol inhaler (if prescribed) for chest tightness, shortness or breath, wheezing, or tight/wheezing cough especially when brought on with deep breath.  Follow CDC isolation guidelines as provided  Molnupiravir take 4 pills twice a day for 5 days.  Do not have sexual intercourse with women of childbearing age for 3 months.     Patient Instructions   POSITIVE COVID TEST      You have tested positive for COVID-19 today.  Please note that patients who test positive for COVID-19 are required by the CDC to undergo isolation for 5 days, then wearing a mask around others for an additional 5 days, after their symptoms first began following the new updated guidelines of 12/27/2021. This isolation starts from the day you first developed symptoms, not the day of your positive test. For example, if your symptoms began on a Monday but tested positive on the following Wednesday, your 5-day isolation begins from that Monday, not the Wednesday you tested positive.  However, if you are asymptomatic (a person who does not have any symptoms) and COVID-19 positive, your 5-day isolation begins on the day you tested positive,  regardless of exposure date.  Also, per the CDC guidelines, once your 5 days have passed, symptoms have resolved or are improving, and you have not had fever greater than 100.4F in the last 24 hours without taking any fever reducers such as Tylenol (Acetaminophen) or Motrin (Ibuprofen), you may return to your normal activities including social distancing, wearing masks, and frequent handwashing - YOU DO NOT NEED ANOTHER TEST IN ORDER TO END YOUR QUARANTINE.

## 2022-01-29 NOTE — LETTER
"  January 29, 2022      [facility]  [address] [address]  Phone: [phone]       Patient: Willi Diaz   YOB: 1955  Date of Visit: 01/29/2022    To Whom It May Concern:    Sierra Diaz  was at [facility] on 01/29/2022. The patient may return to work/school on 02/03/2022 as long as symptoms are improving and no fever for 24 hours with an additional 5 days of mask wearing. There is no indication for retesting,. See CDC guidelines below.  If you have any questions or concerns, or if I can be of further assistance, please do not hesitate to contact me.    "You have tested positive for COVID-19 today.  Please note that patients who test positive for COVID-19 are required by the CDC to undergo isolation for 5 days, then wearing a mask around others for an additional 5 days, after their symptoms first began following the new updated guidelines of 12/27/2021. This isolation starts from the day you first developed symptoms, not the day of your positive test. For example, if your symptoms began on a Monday but tested positive on the following Wednesday, your 5-day isolation begins from that Monday, not the Wednesday you tested positive.  However, if you are asymptomatic (a person who does not have any symptoms) and COVID-19 positive, your 5-day isolation begins on the day you tested positive, regardless of exposure date.  Also, per the CDC guidelines, once your 5 days have passed, symptoms have resolved or are improving, and you have not had fever greater than 100.4F in the last 24 hours without taking any fever reducers such as Tylenol (Acetaminophen) or Motrin (Ibuprofen), you may return to your normal activities including social distancing, wearing masks, and frequent handwashing - YOU DO NOT NEED ANOTHER TEST IN ORDER TO END YOUR QUARANTINE."    Sincerely,    Ayesha Santos NP     "

## 2022-01-29 NOTE — PATIENT INSTRUCTIONS
Symptomatic treatment to include:    Rest, increase fluid intake to include electrolyte replacement  Ibuprofen/Tylenol as directed for fever, sore throat, body aches  Zrytec and flonase for sinus symptoms  Phenergan cough syrup at night for cough  Tessalon perles cough pills as needed day or night  Mucinex D over the counter as directed for sinus congestion.  Coricidin HBP if you have high blood pressure.  Zofran as directed for nausea/vomiting.  Warm, salt water gargles, over the counter throat lozenges or sprays as desires.   Imodium over the counter as directed for diarrhea.  ER for difficulty breathing not relieved by rest, excessive lethargy and/or change in mental status  Albuterol inhaler (if prescribed) for chest tightness, shortness or breath, wheezing, or tight/wheezing cough especially when brought on with deep breath.  Follow CDC isolation guidelines as provided  Molnupiravir take 4 pills twice a day for 5 days.  Do not have sexual intercourse with women of childbearing age for 3 months.     Patient Instructions   POSITIVE COVID TEST      You have tested positive for COVID-19 today.  Please note that patients who test positive for COVID-19 are required by the CDC to undergo isolation for 5 days, then wearing a mask around others for an additional 5 days, after their symptoms first began following the new updated guidelines of 12/27/2021. This isolation starts from the day you first developed symptoms, not the day of your positive test. For example, if your symptoms began on a Monday but tested positive on the following Wednesday, your 5-day isolation begins from that Monday, not the Wednesday you tested positive.  However, if you are asymptomatic (a person who does not have any symptoms) and COVID-19 positive, your 5-day isolation begins on the day you tested positive, regardless of exposure date.  Also, per the CDC guidelines, once your 5 days have passed, symptoms have resolved or are improving, and  you have not had fever greater than 100.4F in the last 24 hours without taking any fever reducers such as Tylenol (Acetaminophen) or Motrin (Ibuprofen), you may return to your normal activities including social distancing, wearing masks, and frequent handwashing - YOU DO NOT NEED ANOTHER TEST IN ORDER TO END YOUR QUARANTINE.      Patient Education       COVID-19 Discharge Instructions   About this topic   Coronavirus disease 2019 is also known as COVID-19. It is a viral illness that infects the lungs. It is caused by a virus called SARS-associated coronavirus (SARS-CoV-2).  The signs of COVID-19 most often start a few days after you have been infected. In some people, it takes longer to show signs. Others never show signs of the infection. You may have a cough, fever, shaking chills and it may be hard to breathe. You may be very tired, have muscle aches, a headache or sore throat. Some people have an upset stomach or loose stools. Others lose their sense of smell or taste. You may not have these signs all the time and they may come and go while you are sick.  The virus spreads easily through droplets when you talk, sneeze, or cough. You can pass the virus to others when you are talking close together, singing, hugging, sharing food, or shaking hands. Doctors believe the germs also survive on surfaces like tables, door handles, and telephones. However, this is not a common way that COVID-19 spreads. Doctors believe you can also spread the infection even if you dont have any symptoms, but they do not know how that happens. This is why getting vaccinated is one of the best ways to keep you healthy and slow the spread of the virus.  Some people have a mild case of COVID-19 and are able to stay at home and away from others until they feel better. Others may need to be in the hospital if they are very sick. Some people with COVID-19 can have some symptoms for weeks or months. People with COVID-19 must isolate themselves.  You can start to be around others when your doctor says it is safe to do so.       What care is needed at home?   · Ask your doctor what you need to do when you go home. Make sure you ask questions if you do not understand what the doctor says.  · Drink lots of water, juice, or broth to replace fluids lost from a fever.  · You may use cool mist humidifiers to help ease congestion and coughing.  · Use 2 to 3 pillows to prop yourself up when you lie down to make it easier to breathe and sleep.  · Do not smoke and do not drink beer, wine, and mixed drinks (alcohol).  · To lower the chance of passing the infection to others, get a COVID-19 vaccine after your infection has resolved.  · If you have not been fully vaccinated:  ? Wear a mask over your mouth and nose if you are around others who are not sick. Cloth masks work best if they have more than one layer of fabric.  ? Wash your hands often.  ? Stay home in a separate room, if possible, away from others. Only go out to get medical care.  ? Use a separate bathroom if possible.  ? Do not make food for others.  What follow-up care is needed?   · Your doctor may ask you to make visits to the office to check on your progress. Be sure to keep these visits. Make sure you wear a mask at these visits.  · If you can, tell the staff you have COVID-19 ahead of time so they can take extra care to stop the disease from spreading.  · It may take a few weeks before your health returns to normal.  What drugs may be needed?   The doctor may order drugs to:  · Help with breathing  · Help with fever  · Help with swelling in your airways and lungs  · Control coughing  · Ease a sore throat  · Help a runny or stuffy nose  Will physical activity be limited?   You may have to limit your physical activity. Talk to your doctor about the right amount of activity for you. If you have been very sick with COVID-19, it can take some time to get your strength back.  Will there be any other care  needed?   Doctors do not know how long you can pass the virus on to others after you are sick. This is why it is important to stay in a separate room, if possible, when you are sick. For now, doctors are giving general guidelines for you to follow after you have been sick. Before you go around other people, you should:  · Be fever free for 24 hours without taking any drugs to lower the fever  · Have no symptoms of cough or shortness of breath  · Wait at least 10 days after first having symptoms or your first positive test, and you need to be symptom free as above. Some experts suggest waiting 20 days if you have had a more severe infection.  Talk with your doctor about getting a COVID-19 vaccine.  What problems could happen?   · Fluid loss. This is dehydration.  · Short-term or long-term lung damage  · Heart problems  · Death  When do I need to call the doctor?   · You are having so much trouble breathing that you can only say one or two words at a time.  · You need to sit upright at all times to be able to breathe and/or cannot lie down.  · You are very confused or cannot stay awake.  · Your lips or skin start to turn blue or grey.  · You think you might be having a medical emergency. Some examples of medical emergencies are:  ? Severe chest pain.  ? Not able to speak or move normally.  · You have trouble breathing when talking or sitting still.  · You have new shortness of breath.  · You become weak or dizzy.  · You have very dark urine or do not pass urine for more than 8 hours.  · You have new or worsening COVID-19 symptoms like:  ? Fever  ? Cough  ? Feeling very tired  ? Shaking chills  ? Headache  ? Trouble swallowing  ? Throwing up  ? Loose stools  ? Reddish purple spots on your fingers or toes  Teach Back: Helping You Understand   The Teach Back Method helps you understand the information we are giving you. After you talk with the staff, tell them in your own words what you learned. This helps to make sure  "the staff has described each thing clearly. It also helps to explain things that may have been confusing. Before going home, make sure you can do these:  · I can tell you about my condition.  · I can tell you what may help ease my breathing.  · I can tell you what I can do to help avoid passing the infection to others.  · I can tell you what I will do if I have trouble breathing; feel sleepy or confused; or my fingertips, fingernails, skin, or lips are blue.  Where can I learn more?   Centers for Disease Control and Prevention  https://www.cdc.gov/coronavirus/2019-ncov/about/index.html   Centers for Disease Control and Prevention  https://www.cdc.gov/coronavirus/2019-ncov/hcp/disposition-in-home-patients.html   World Health Organization  https://www.who.int/news-room/q-a-detail/u-h-izomaaoibxral   Last Reviewed Date   2021-10-05  Consumer Information Use and Disclaimer   This information is not specific medical advice and does not replace information you receive from your health care provider. This is only a brief summary of general information. It does NOT include all information about conditions, illnesses, injuries, tests, procedures, treatments, therapies, discharge instructions or life-style choices that may apply to you. You must talk with your health care provider for complete information about your health and treatment options. This information should not be used to decide whether or not to accept your health care providers advice, instructions or recommendations. Only your health care provider has the knowledge and training to provide advice that is right for you.  Copyright   Copyright © 2021 UpToDate, Inc. and its affiliates and/or licensors. All rights reserved.  Patient Education       Recovery After COVID-19   The Basics   Written by the doctors and editors at [employer]   What is COVID-19?   COVID-19 stands for "coronavirus disease 2019." It is caused by a virus called SARS-CoV-2. The virus first " "appeared in late 2019 and quickly spread around the world.  People with COVID-19 can have fever, cough, trouble breathing (when the virus infects the lungs), and other symptoms.  Since COVID-19 is a fairly new disease, experts are still studying how people recover from it. They are also studying the possible long-term effects. This article has information about recovery after COVID-19, including the ongoing symptoms some people have. More general information about COVID-19 is available separately. (See "Patient education: COVID-19 overview (The Basics)".)  When will I get better after having COVID-19?   For most people who get COVID-19, symptoms get better within a few weeks. But some people, especially those who got sick enough to need to go to the hospital, continue to have symptoms for longer. These can be mild or more serious.  Doctors are still learning about COVID-19. But they generally describe 2 stages of illness and recovery:  · "Acute COVID-19" - This refers to symptoms lasting up to 4 weeks after a person is infected. Most people with mild COVID-19 do not have symptoms beyond this stage, but some do.  · "Post-COVID conditions" - This refers to symptoms that continue beyond 4 weeks after being infected. This is more common in people who were critically ill, meaning they needed to stay in the intensive care unit ("ICU"), be put on a ventilator (breathing machine), or have other types of breathing support.  Different terms have been used when people have persistent symptoms, meaning symptoms that last longer than a few months. These include "long-COVID," "chronic COVID-19," and "post-COVID syndrome." Doctors also use the term "post-acute sequelae of SARS-CoV-2 infection," or "PASC."  What symptoms are most likely to persist?   This is not the same for everyone. But symptoms that are more likely to last beyond a few weeks include:  · Feeling very tired (fatigue)  · Trouble breathing  · Chest " "discomfort  · Cough  Other physical symptoms can also continue beyond a few weeks. These include problems with sense of smell or taste, headache, runny nose, joint or muscle pain, trouble sleeping or eating, sweating, and diarrhea.  Some people have ongoing psychological symptoms, too. These might include:  · Trouble thinking clearly, focusing, or remembering  · Depression, anxiety, or a related condition called post-traumatic stress disorder ("PTSD")  It's hard for doctors to predict when symptoms will improve, since this is different for different people. Your recovery will depend on your age, your overall health, and how severe your COVID-19 symptoms are. Some symptoms, like fatigue, might continue even while others improve or go away.  How long will I be contagious?   It's hard to know for sure. In general, most people are no longer contagious by 10 days after their symptoms started. But this depends on several things, including how severe the infection was and what symptoms they continue to have. It's important to talk to your doctor or nurse to figure out when you are no longer considered contagious.  When should I call my doctor or nurse?   Some fatigue is common, and can persist for a few weeks into your recovery. But if you had COVID-19 and continue to have bothersome symptoms (such as severe fatigue, or chest discomfort or shortness of breath) after 2 to 3 weeks, call your doctor or nurse. You should also call if you start to feel worse or develop any new symptoms. They will tell you what to do and if you need to be seen.  Depending on your symptoms, you might need tests. This will help your doctor or nurse better understand what is causing your symptoms and whether you need treatment.  How are persistent COVID-19 symptoms treated?   In general, treatment involves addressing whichever symptoms you have. Often that means combining a few different treatments.  If you are tired, try to get plenty of rest. You " "can also try the following things to help with fatigue:  · Plan to do important tasks when you expect to have the most energy, typically in the morning  · Pace yourself so you do not do too much at once, and take breaks throughout the day if you feel tired  · Think about what tasks and activities are most important each day, so you dont use more energy than you need to  If you are not sleeping well, improving your "sleep hygiene" can help. This involves things like going to bed and getting up at the same time each day, avoiding caffeine and alcohol late in the day, and not looking at screens before bed.  Depending on your situation, you might also need:  · Medicines to relieve symptoms like cough or pain  · Cardiac rehabilitation - This involves improving your heart health through things like exercise, dietary changes, and quitting smoking (if you smoke).  · Pulmonary rehabilitation - This includes breathing exercises to help strengthen your lungs.  · Physical and occupational therapy - This involves learning exercises, movements, and ways of doing everyday tasks.  · Treatments for anxiety or depression - This can involve medicine and/or counseling.  · Exercises and strategies to help with memory and focus  Is there any way to avoid persistent COVID-19 symptoms?   The only way to avoid this for sure is to avoid getting COVID-19. It's true that most people who are infected will not get very sick. But it's impossible to know who will recover quickly and who will have persistent symptoms.  The best way to prevent COVID-19 is to get vaccinated. In addition to protecting yourself, getting the vaccine will also help protect other people, including those who are at higher risk of getting very sick or dying. People who are not vaccinated can lower their risk by social distancing, wearing face masks in public, and washing their hands often.  All topics are updated as new evidence becomes available and our peer review " process is complete.  This topic retrieved from [employer] on: Sep 30, 2021.  Topic 497318 Version 6.0  Release: 29.4.2 - C29.263  © 2021 UpToDate, Inc. and/or its affiliates. All rights reserved.  Consumer Information Use and Disclaimer   This information is not specific medical advice and does not replace information you receive from your health care provider. This is only a brief summary of general information. It does NOT include all information about conditions, illnesses, injuries, tests, procedures, treatments, therapies, discharge instructions or life-style choices that may apply to you. You must talk with your health care provider for complete information about your health and treatment options. This information should not be used to decide whether or not to accept your health care provider's advice, instructions or recommendations. Only your health care provider has the knowledge and training to provide advice that is right for you. The use of this information is governed by the [employer] End User License Agreement, available at https://www.[employer].Wummelkiste/en/solutions/Tasspass/about/elizabeth.The use of [employer] content is governed by the [employer] Terms of Use. ©2021 UpToDate, Inc. All rights reserved.  Copyright   © 2021 UpToDate, Inc. and/or its affiliates. All rights reserved.

## 2022-01-31 ENCOUNTER — TELEPHONE (OUTPATIENT)
Dept: ADMINISTRATIVE | Facility: OTHER | Age: 67
End: 2022-01-31

## 2022-07-26 ENCOUNTER — HOSPITAL ENCOUNTER (INPATIENT)
Facility: HOSPITAL | Age: 67
LOS: 1 days | Discharge: HOME | DRG: 378 | End: 2022-07-27
Attending: EMERGENCY MEDICINE | Admitting: INTERNAL MEDICINE
Payer: MEDICARE

## 2022-07-26 ENCOUNTER — OFFICE VISIT (OUTPATIENT)
Dept: URGENT CARE | Facility: CLINIC | Age: 67
End: 2022-07-26
Payer: MEDICARE

## 2022-07-26 VITALS
HEART RATE: 88 BPM | DIASTOLIC BLOOD PRESSURE: 86 MMHG | WEIGHT: 237.63 LBS | TEMPERATURE: 99 F | HEIGHT: 75 IN | SYSTOLIC BLOOD PRESSURE: 93 MMHG | BODY MASS INDEX: 29.55 KG/M2 | RESPIRATION RATE: 17 BRPM

## 2022-07-26 DIAGNOSIS — K92.1 MELENA: ICD-10-CM

## 2022-07-26 DIAGNOSIS — R19.5 DARK STOOLS: Primary | ICD-10-CM

## 2022-07-26 DIAGNOSIS — K92.2 GASTROINTESTINAL HEMORRHAGE, UNSPECIFIED GASTROINTESTINAL HEMORRHAGE TYPE: Primary | ICD-10-CM

## 2022-07-26 DIAGNOSIS — K26.9 DUODENAL ULCER: ICD-10-CM

## 2022-07-26 DIAGNOSIS — R42 LIGHT HEADEDNESS: ICD-10-CM

## 2022-07-26 DIAGNOSIS — K92.2 GI BLEED: ICD-10-CM

## 2022-07-26 DIAGNOSIS — R42 DIZZINESS: ICD-10-CM

## 2022-07-26 PROBLEM — I48.91 ATRIAL FIBRILLATION: Status: ACTIVE | Noted: 2022-07-26

## 2022-07-26 PROBLEM — E11.9 TYPE 2 DIABETES MELLITUS: Status: ACTIVE | Noted: 2021-05-20

## 2022-07-26 PROBLEM — E11.59 HYPERTENSION ASSOCIATED WITH DIABETES: Status: ACTIVE | Noted: 2022-07-26

## 2022-07-26 PROBLEM — I15.2 HYPERTENSION ASSOCIATED WITH DIABETES: Status: ACTIVE | Noted: 2022-07-26

## 2022-07-26 LAB
ABO + RH BLD: NORMAL
ANION GAP SERPL CALC-SCNC: 11 MMOL/L (ref 8–16)
BASOPHILS # BLD AUTO: 0.04 K/UL (ref 0–0.2)
BASOPHILS NFR BLD: 0.3 % (ref 0–1.9)
BLD GP AB SCN CELLS X3 SERPL QL: NORMAL
BLD PROD TYP BPU: NORMAL
BLD PROD TYP BPU: NORMAL
BLOOD UNIT EXPIRATION DATE: NORMAL
BLOOD UNIT EXPIRATION DATE: NORMAL
BLOOD UNIT TYPE CODE: 5100
BLOOD UNIT TYPE CODE: 5100
BLOOD UNIT TYPE: NORMAL
BLOOD UNIT TYPE: NORMAL
BUN SERPL-MCNC: 58 MG/DL (ref 8–23)
CALCIUM SERPL-MCNC: 9.1 MG/DL (ref 8.7–10.5)
CHLORIDE SERPL-SCNC: 106 MMOL/L (ref 95–110)
CO2 SERPL-SCNC: 23 MMOL/L (ref 23–29)
CODING SYSTEM: NORMAL
CODING SYSTEM: NORMAL
CREAT SERPL-MCNC: 1.7 MG/DL (ref 0.5–1.4)
DIFFERENTIAL METHOD: ABNORMAL
DISPENSE STATUS: NORMAL
DISPENSE STATUS: NORMAL
EOSINOPHIL # BLD AUTO: 0.1 K/UL (ref 0–0.5)
EOSINOPHIL NFR BLD: 1.1 % (ref 0–8)
ERYTHROCYTE [DISTWIDTH] IN BLOOD BY AUTOMATED COUNT: 14.6 % (ref 11.5–14.5)
EST. GFR  (AFRICAN AMERICAN): 47 ML/MIN/1.73 M^2
EST. GFR  (NON AFRICAN AMERICAN): 41 ML/MIN/1.73 M^2
GLUCOSE SERPL-MCNC: 120 MG/DL (ref 70–110)
HCT VFR BLD AUTO: 17.7 % (ref 40–54)
HGB BLD-MCNC: 6.1 G/DL (ref 14–18)
IMM GRANULOCYTES # BLD AUTO: 0.07 K/UL (ref 0–0.04)
IMM GRANULOCYTES NFR BLD AUTO: 0.6 % (ref 0–0.5)
LYMPHOCYTES # BLD AUTO: 2.1 K/UL (ref 1–4.8)
LYMPHOCYTES NFR BLD: 18.6 % (ref 18–48)
MCH RBC QN AUTO: 33.2 PG (ref 27–31)
MCHC RBC AUTO-ENTMCNC: 34.5 G/DL (ref 32–36)
MCV RBC AUTO: 96 FL (ref 82–98)
MONOCYTES # BLD AUTO: 0.6 K/UL (ref 0.3–1)
MONOCYTES NFR BLD: 5.2 % (ref 4–15)
NEUTROPHILS # BLD AUTO: 8.5 K/UL (ref 1.8–7.7)
NEUTROPHILS NFR BLD: 74.2 % (ref 38–73)
NRBC BLD-RTO: 0 /100 WBC
NUM UNITS TRANS PACKED RBC: NORMAL
NUM UNITS TRANS PACKED RBC: NORMAL
PLATELET # BLD AUTO: 215 K/UL (ref 150–450)
PMV BLD AUTO: 9.1 FL (ref 9.2–12.9)
POTASSIUM SERPL-SCNC: 4 MMOL/L (ref 3.5–5.1)
RBC # BLD AUTO: 1.84 M/UL (ref 4.6–6.2)
SARS-COV-2 RDRP RESP QL NAA+PROBE: NEGATIVE
SODIUM SERPL-SCNC: 140 MMOL/L (ref 136–145)
WBC # BLD AUTO: 11.44 K/UL (ref 3.9–12.7)

## 2022-07-26 PROCEDURE — 85025 COMPLETE CBC W/AUTO DIFF WBC: CPT | Performed by: EMERGENCY MEDICINE

## 2022-07-26 PROCEDURE — C9113 INJ PANTOPRAZOLE SODIUM, VIA: HCPCS | Performed by: NURSE PRACTITIONER

## 2022-07-26 PROCEDURE — 99214 PR OFFICE/OUTPT VISIT, EST, LEVL IV, 30-39 MIN: ICD-10-PCS | Mod: S$GLB,,,

## 2022-07-26 PROCEDURE — 80048 BASIC METABOLIC PNL TOTAL CA: CPT | Performed by: EMERGENCY MEDICINE

## 2022-07-26 PROCEDURE — 1159F PR MEDICATION LIST DOCUMENTED IN MEDICAL RECORD: ICD-10-PCS | Mod: CPTII,S$GLB,,

## 2022-07-26 PROCEDURE — 93010 ELECTROCARDIOGRAM REPORT: CPT | Mod: ,,, | Performed by: SPECIALIST

## 2022-07-26 PROCEDURE — 3079F DIAST BP 80-89 MM HG: CPT | Mod: CPTII,S$GLB,,

## 2022-07-26 PROCEDURE — U0002 COVID-19 LAB TEST NON-CDC: HCPCS | Performed by: EMERGENCY MEDICINE

## 2022-07-26 PROCEDURE — 1126F AMNT PAIN NOTED NONE PRSNT: CPT | Mod: CPTII,S$GLB,,

## 2022-07-26 PROCEDURE — 63600175 PHARM REV CODE 636 W HCPCS: Performed by: NURSE PRACTITIONER

## 2022-07-26 PROCEDURE — 93005 ELECTROCARDIOGRAM TRACING: CPT

## 2022-07-26 PROCEDURE — 1159F MED LIST DOCD IN RCRD: CPT | Mod: CPTII,S$GLB,,

## 2022-07-26 PROCEDURE — 1160F RVW MEDS BY RX/DR IN RCRD: CPT | Mod: CPTII,S$GLB,,

## 2022-07-26 PROCEDURE — 3079F PR MOST RECENT DIASTOLIC BLOOD PRESSURE 80-89 MM HG: ICD-10-PCS | Mod: CPTII,S$GLB,,

## 2022-07-26 PROCEDURE — 3008F PR BODY MASS INDEX (BMI) DOCUMENTED: ICD-10-PCS | Mod: CPTII,S$GLB,,

## 2022-07-26 PROCEDURE — 3074F SYST BP LT 130 MM HG: CPT | Mod: CPTII,S$GLB,,

## 2022-07-26 PROCEDURE — 3074F PR MOST RECENT SYSTOLIC BLOOD PRESSURE < 130 MM HG: ICD-10-PCS | Mod: CPTII,S$GLB,,

## 2022-07-26 PROCEDURE — 1160F PR REVIEW ALL MEDS BY PRESCRIBER/CLIN PHARMACIST DOCUMENTED: ICD-10-PCS | Mod: CPTII,S$GLB,,

## 2022-07-26 PROCEDURE — 99291 CRITICAL CARE FIRST HOUR: CPT | Mod: 25

## 2022-07-26 PROCEDURE — 96361 HYDRATE IV INFUSION ADD-ON: CPT

## 2022-07-26 PROCEDURE — 96360 HYDRATION IV INFUSION INIT: CPT

## 2022-07-26 PROCEDURE — 36430 TRANSFUSION BLD/BLD COMPNT: CPT

## 2022-07-26 PROCEDURE — 12000002 HC ACUTE/MED SURGE SEMI-PRIVATE ROOM

## 2022-07-26 PROCEDURE — 86920 COMPATIBILITY TEST SPIN: CPT | Performed by: EMERGENCY MEDICINE

## 2022-07-26 PROCEDURE — 93010 EKG 12-LEAD: ICD-10-PCS | Mod: ,,, | Performed by: SPECIALIST

## 2022-07-26 PROCEDURE — 1126F PR PAIN SEVERITY QUANTIFIED, NO PAIN PRESENT: ICD-10-PCS | Mod: CPTII,S$GLB,,

## 2022-07-26 PROCEDURE — 3008F BODY MASS INDEX DOCD: CPT | Mod: CPTII,S$GLB,,

## 2022-07-26 PROCEDURE — 86850 RBC ANTIBODY SCREEN: CPT | Performed by: EMERGENCY MEDICINE

## 2022-07-26 PROCEDURE — 25000003 PHARM REV CODE 250: Performed by: EMERGENCY MEDICINE

## 2022-07-26 PROCEDURE — 36415 COLL VENOUS BLD VENIPUNCTURE: CPT | Performed by: EMERGENCY MEDICINE

## 2022-07-26 PROCEDURE — P9016 RBC LEUKOCYTES REDUCED: HCPCS | Performed by: EMERGENCY MEDICINE

## 2022-07-26 PROCEDURE — 99292 CRITICAL CARE ADDL 30 MIN: CPT

## 2022-07-26 PROCEDURE — 99214 OFFICE O/P EST MOD 30 MIN: CPT | Mod: S$GLB,,,

## 2022-07-26 RX ORDER — SODIUM CHLORIDE 9 MG/ML
INJECTION, SOLUTION INTRAVENOUS ONCE
Status: COMPLETED | OUTPATIENT
Start: 2022-07-26 | End: 2022-07-27

## 2022-07-26 RX ORDER — MAG HYDROX/ALUMINUM HYD/SIMETH 200-200-20
30 SUSPENSION, ORAL (FINAL DOSE FORM) ORAL
Status: DISCONTINUED | OUTPATIENT
Start: 2022-07-26 | End: 2022-07-27 | Stop reason: HOSPADM

## 2022-07-26 RX ORDER — PANTOPRAZOLE SODIUM 40 MG/10ML
40 INJECTION, POWDER, LYOPHILIZED, FOR SOLUTION INTRAVENOUS 2 TIMES DAILY
Status: DISCONTINUED | OUTPATIENT
Start: 2022-07-26 | End: 2022-07-27 | Stop reason: HOSPADM

## 2022-07-26 RX ORDER — SUCRALFATE 1 G/10ML
1 SUSPENSION ORAL EVERY 6 HOURS
Status: DISCONTINUED | OUTPATIENT
Start: 2022-07-27 | End: 2022-07-27 | Stop reason: HOSPADM

## 2022-07-26 RX ORDER — DILTIAZEM HYDROCHLORIDE 180 MG/1
180 CAPSULE, COATED, EXTENDED RELEASE ORAL DAILY
Status: DISCONTINUED | OUTPATIENT
Start: 2022-07-27 | End: 2022-07-27 | Stop reason: HOSPADM

## 2022-07-26 RX ORDER — APIXABAN 5 MG/1
5 TABLET, FILM COATED ORAL 2 TIMES DAILY
COMMUNITY
Start: 2022-07-12

## 2022-07-26 RX ORDER — AMLODIPINE BESYLATE 5 MG/1
10 TABLET ORAL DAILY
Status: DISCONTINUED | OUTPATIENT
Start: 2022-07-27 | End: 2022-07-26

## 2022-07-26 RX ORDER — ALLOPURINOL 300 MG/1
300 TABLET ORAL DAILY
Status: CANCELLED | OUTPATIENT
Start: 2022-07-27

## 2022-07-26 RX ORDER — ALLOPURINOL 300 MG/1
300 TABLET ORAL DAILY
COMMUNITY
Start: 2022-06-27

## 2022-07-26 RX ORDER — PRAVASTATIN SODIUM 10 MG/1
20 TABLET ORAL DAILY
Status: DISCONTINUED | OUTPATIENT
Start: 2022-07-27 | End: 2022-07-27 | Stop reason: HOSPADM

## 2022-07-26 RX ADMIN — SODIUM CHLORIDE 1000 ML: 0.9 INJECTION, SOLUTION INTRAVENOUS at 08:07

## 2022-07-26 RX ADMIN — SODIUM CHLORIDE 1000 ML: 0.9 INJECTION, SOLUTION INTRAVENOUS at 06:07

## 2022-07-26 RX ADMIN — ALUMINUM HYDROXIDE, MAGNESIUM HYDROXIDE, AND SIMETHICONE 30 ML: 200; 200; 20 SUSPENSION ORAL at 08:07

## 2022-07-26 RX ADMIN — PANTOPRAZOLE SODIUM 40 MG: 40 INJECTION, POWDER, LYOPHILIZED, FOR SOLUTION INTRAVENOUS at 08:07

## 2022-07-26 NOTE — PATIENT INSTRUCTIONS
Go straight to the emergency room for further evaluation and treatment of dark stools, lightheaded and dizziness.     Do not eat or drink anything before being seen by a provider.

## 2022-07-26 NOTE — ED PROVIDER NOTES
"Encounter Date: 7/26/2022    SCRIBE #1 NOTE: I, Loraineandrea Woodward, am scribing for, and in the presence of, Kev Burrell MD.       History     Chief Complaint   Patient presents with    Dizziness     Reports dizziness x 2 days -- sent for further eval from urgent care. Denies HA or weakness.      Time seen by provider: 6:19 PM on 07/26/2022    Willi Diaz is a 66 y.o. male with a PMHx of HTN and DM II who presents to the ED with his step daughter for evaluation of dizziness with an associated unsteady gait.  Patient was evaluated for same Sx by urgent care and was referred to the ED for further workup and Tx.  He reports Sx began with a recent upset stomach for which he took Pepto-Bismol 4 days ago followed by episodic black stools with 1 episode today.  He then took MiraLAX to stimulate a BM to "get rid" of the black stool.  Patient notes a recent life stressor in which his wife passed away last week.  Since his loss he expresses a decreased appetite, visual disturbances (bright lights), worsening dizziness that is improved with sitting still, and tremors to the bilateral hands.  The patient denies blurred or double vision, SOB, HA, weakness, ear pain or any other symptoms at this time.  No documented PSHx.  Current medication changes include the addition of Eliquis for management of A-fib.      The history is provided by the patient and a relative.     Review of patient's allergies indicates:   Allergen Reactions    Pcn [penicillins] Itching and Swelling     Past Medical History:   Diagnosis Date    Diabetes mellitus     type 2    Hypertension      History reviewed. No pertinent surgical history.  Family History   Family history unknown: Yes     Social History     Tobacco Use    Smoking status: Former Smoker    Smokeless tobacco: Never Used   Substance Use Topics    Alcohol use: Yes     Alcohol/week: 30.0 standard drinks     Types: 30 Cans of beer per week    Drug use: Never     Review of Systems "   Constitutional: Positive for appetite change.   HENT: Negative for ear pain.    Eyes: Positive for visual disturbance.   Respiratory: Negative for shortness of breath.    Musculoskeletal: Positive for gait problem.   Neurological: Positive for dizziness and tremors. Negative for weakness and headaches.   All other systems reviewed and are negative.      Physical Exam     Initial Vitals [07/26/22 1753]   BP Pulse Resp Temp SpO2   (!) 112/55 88 18 98.5 °F (36.9 °C) 100 %      MAP       --         Physical Exam    Nursing note and vitals reviewed.  Constitutional: He appears well-developed and well-nourished. He is not diaphoretic.  Non-toxic appearance. He does not have a sickly appearance. He does not appear ill. No distress.   HENT:   Head: Normocephalic and atraumatic.   Eyes: EOM are normal.   Neck: Neck supple.   Normal range of motion.  Cardiovascular: Normal rate, regular rhythm and normal heart sounds. Exam reveals no gallop and no friction rub.    No murmur heard.  Pulmonary/Chest: Breath sounds normal. No respiratory distress. He has no wheezes. He has no rhonchi. He has no rales.   Abdominal: Abdomen is soft. He exhibits no distension. There is no abdominal tenderness. There is no rebound and no guarding.   Musculoskeletal:         General: Normal range of motion.      Cervical back: Normal range of motion and neck supple. No rigidity. Normal range of motion.     Neurological: He is alert and oriented to person, place, and time. He has normal strength. Gait normal.   Normal finger-to-nose.    Skin: Skin is warm and dry. No rash noted.   Psychiatric: He has a normal mood and affect. His behavior is normal. Judgment and thought content normal.         ED Course   Critical Care    Date/Time: 7/26/2022 6:50 PM  Performed by: Kev Burrell MD  Authorized by: Kev Burrell MD   Direct patient critical care time: 45 minutes  Additional history critical care time: 10 minutes  Ordering / reviewing critical  care time: 12 minutes  Documentation critical care time: 6 minutes  Consulting other physicians critical care time: 5 (GI, HM) minutes  Total critical care time (exclusive of procedural time) : 78 minutes  Critical care was necessary to treat or prevent imminent or life-threatening deterioration of the following conditions: GIB.  Critical care was time spent personally by me on the following activities: development of treatment plan with patient or surrogate, discussions with consultants, evaluation of patient's response to treatment, examination of patient, obtaining history from patient or surrogate, ordering and performing treatments and interventions, ordering and review of laboratory studies, pulse oximetry, re-evaluation of patient's condition and review of old charts.        Labs Reviewed   CBC W/ AUTO DIFFERENTIAL - Abnormal; Notable for the following components:       Result Value    RBC 1.84 (*)     Hemoglobin 6.1 (*)     Hematocrit 17.7 (*)     MCH 33.2 (*)     RDW 14.6 (*)     MPV 9.1 (*)     Immature Granulocytes 0.6 (*)     Gran # (ANC) 8.5 (*)     Immature Grans (Abs) 0.07 (*)     Gran % 74.2 (*)     All other components within normal limits    Narrative:        critical result(s) called and verbal readback obtained from   Marleny Vargas (paramedic) by [facility] 07/26/2022 18:48   BASIC METABOLIC PANEL - Abnormal; Notable for the following components:    Glucose 120 (*)     BUN 58 (*)     Creatinine 1.7 (*)     eGFR if  47 (*)     eGFR if non  41 (*)     All other components within normal limits   SARS-COV-2 RNA AMPLIFICATION, QUAL   TYPE & SCREEN        ECG Results          EKG 12-lead (In process)  Result time 07/27/22 07:31:29    In process by Interface, Lab In [facility] (07/27/22 07:31:29)                 Narrative:    Test Reason : K92.2,    Vent. Rate : 070 BPM     Atrial Rate : 070 BPM     P-R Int : 312 ms          QRS Dur : 158 ms      QT Int : 430 ms       P-R-T  Axes : 027 -38 003 degrees     QTc Int : 464 ms    Sinus rhythm with 1st degree A-V block  Left axis deviation  Right bundle branch block  Voltage criteria for left ventricular hypertrophy  Cannot rule out Septal infarct ,age undetermined  Abnormal ECG  No previous ECGs available    Referred By: [email or web address]   SELF           Confirmed By:                             Imaging Results    None          Medications   sodium chloride 0.9% bolus 1,000 mL (0 mLs Intravenous Stopped 7/26/22 2048)   sodium chloride 0.9% bolus 1,000 mL (0 mLs Intravenous Stopped 7/26/22 2155)   0.9%  NaCl infusion (0 mL/hr Intravenous Stopped 7/27/22 1416)     Medical Decision Making:   History:   Old Medical Records: I decided to obtain old medical records.  Clinical Tests:   Lab Tests: Ordered and Reviewed          Scribe Attestation:   Scribe #1: I performed the above scribed service and the documentation accurately describes the services I performed. I attest to the accuracy of the note.        ED Course as of 07/28/22 1309   Tue Jul 26, 2022 1812 BP(!): 112/55 [EF]   1812 Temp: 98.5 °F (36.9 °C) [EF]   1812 Temp src: Oral [EF]   1812 Pulse: 88 [EF]   1812 Resp: 18 [EF]   1812 SpO2: 100 % [EF]   1848 Hemoglobin(!): 6.1 [EF]   1848 Platelets: 215 [EF]   1908 BUN(!): 58 [EF]   1908 Creatinine(!): 1.7 [EF]   1914 Case d/w dr godfrey, admit to hospital, npo after mn, ppi bid, 2U prbc [EF]   1915 Pilar ballesteros to admit [EF]   2150 Sinus rhythm 1st degree AV block 70 beats per minute left axis right bundle branch block no ST elevation or depression independently interpreted [EF]      ED Course User Index  [EF] Kev Burrell MD           I, Dr. Burrell, personally performed the services described in this documentation. All medical record entries made by the scribe were at my direction and in my presence.  I have reviewed the chart and agree that the record reflects my personal performance and is accurate and complete.7:56 PM  07/28/2022        Clinical Impression:   Final diagnoses:  [K92.2] Gastrointestinal hemorrhage, unspecified gastrointestinal hemorrhage type (Primary)  [K92.2] GI bleed            ED Disposition Condition    Admit               66-year-old male presents to the ER with several days of black stools and weakness.  Hemoglobin is 6. The most recent hemoglobin I have here is outside of our system and was 13, 5 years ago.  No bleeding in the emergency room.  He will be transfused 2 units of packed red blood cells and admitted to Hospital Medicine.  Case was discussed with Gastroenterology from the emergency room.     Kev Burrell MD  07/26/22 1958       Kve Burrell MD  07/28/22 7618

## 2022-07-26 NOTE — ED NOTES
Willi Diaz presents to the ED with c/o dizziness that started 2 days ago. Patient's daughter reports unsteady gait. Patient report recent appetite change that is secondary to the loss of his wife. Patient had an upset stomach, he reports taking pepto for 4 days that resulted in black stools. Patient attempted to take a stool softener to help get rid of the black stools. Patient reports some sensitivity to light and hand tremors. MEL VSS  Verified patient's name and date of birth.

## 2022-07-26 NOTE — PROGRESS NOTES
"Subjective:       Patient ID: Willi Diaz is a 66 y.o. male.    Vitals:  height is 6' 3" (1.905 m) and weight is 107.8 kg (237 lb 9.6 oz). His oral temperature is 98.5 °F (36.9 °C). His blood pressure is 93/86 and his pulse is 88. His respiration is 17.     Chief Complaint: Dizziness, Melena, and Nausea    Pt states that his symptoms started on Saturday night. Patient states that he feels dizzy and unstable, nausea, black stool. Patient states that he has been home the last two days not doing anything because he feels unstable.  Patient states that when he went out yesterday to do some errands and everything seemed very bright to his eyes. Patient states that he took a laxative the other day and he states his stool is still black but soft as well now.     Dizziness:   Chronicity:  New  Onset:  In the past 7 days  Progression since onset:  Gradually worsening  Frequency:  Constantly  Severity:  Moderate  Duration:  5 minutes  Dizziness characteristics:  Sensation of movement, off-balance and trouble focusing eyes  Frequency of Spells:  Daily   Associated symptoms: nausea and light-headedness.no ear pain, no fever, no vomiting, no diaphoresis and no chest pain.   Black stool   Aggravated by:  Getting up  Treatments tried: pepto.  Improvements on treatment:  No relief      Constitution: Negative for activity change, appetite change, chills, sweating, fever and unexpected weight change.   HENT: Negative for ear pain, postnasal drip, sinus pain, sinus pressure and sore throat.    Cardiovascular: Negative for chest pain.   Eyes: Negative for blurred vision.   Respiratory: Negative for chest tightness, cough and shortness of breath.    Gastrointestinal: Positive for nausea and dark colored stools (2 days). Negative for abdominal pain, vomiting, constipation, diarrhea, rectal bleeding, rectal pain and hemorrhoids.   Neurological: Positive for dizziness and light-headedness. Negative for history of vertigo, passing out " and altered mental status.   Psychiatric/Behavioral: Negative for altered mental status.       Objective:      Physical Exam   Constitutional: He is oriented to person, place, and time.  Non-toxic appearance. He does not appear ill. No distress.   HENT:   Head: Normocephalic.   Nose: Nose normal.   Eyes: Conjunctivae are normal. Extraocular movement intact   Cardiovascular: Normal rate, normal heart sounds and normal pulses.   Pulmonary/Chest: Effort normal and breath sounds normal. No respiratory distress.   Abdominal: There is no abdominal tenderness. There is no guarding.   Neurological: no focal deficit. He is alert and oriented to person, place, and time.   Skin: Skin is not diaphoretic. Capillary refill takes 2 to 3 seconds.   Psychiatric: His behavior is normal. Mood normal.         Assessment:       1. Dark stools    2. Light headedness    3. Melena    4. Dizziness          Plan:         Dark stools    Light headedness    Melena    Dizziness    Patient presents with complaints of lightheaded, dizziness and black stool x 2 days. Patient is on eliquis and Mobic. Denies history of prior stomach bleeds. Blood pressure is 93/86, patient with daughter. Patient and daughter informed to go straight to the emergency room for further evaluation and work up. Patient and daughter educated by not going could lead to worsening of symptoms and or death. EMS offered but patient and daughter declined. The daughter will drive him straight to [facility] emergency room. AMA signed.

## 2022-07-27 ENCOUNTER — PATIENT MESSAGE (OUTPATIENT)
Dept: GASTROENTEROLOGY | Facility: CLINIC | Age: 67
End: 2022-07-27
Payer: MEDICARE

## 2022-07-27 ENCOUNTER — ANESTHESIA (OUTPATIENT)
Dept: ENDOSCOPY | Facility: HOSPITAL | Age: 67
DRG: 378 | End: 2022-07-27
Payer: MEDICARE

## 2022-07-27 ENCOUNTER — ANESTHESIA EVENT (OUTPATIENT)
Dept: ENDOSCOPY | Facility: HOSPITAL | Age: 67
DRG: 378 | End: 2022-07-27
Payer: MEDICARE

## 2022-07-27 ENCOUNTER — TELEPHONE (OUTPATIENT)
Dept: GASTROENTEROLOGY | Facility: CLINIC | Age: 67
End: 2022-07-27
Payer: MEDICARE

## 2022-07-27 VITALS
HEIGHT: 75 IN | SYSTOLIC BLOOD PRESSURE: 167 MMHG | RESPIRATION RATE: 16 BRPM | BODY MASS INDEX: 30.34 KG/M2 | TEMPERATURE: 98 F | DIASTOLIC BLOOD PRESSURE: 84 MMHG | WEIGHT: 244 LBS | HEART RATE: 71 BPM | OXYGEN SATURATION: 100 %

## 2022-07-27 DIAGNOSIS — K92.2 GASTROINTESTINAL HEMORRHAGE, UNSPECIFIED GASTROINTESTINAL HEMORRHAGE TYPE: Primary | ICD-10-CM

## 2022-07-27 PROBLEM — D62 ACUTE BLOOD LOSS ANEMIA: Status: ACTIVE | Noted: 2022-07-27

## 2022-07-27 LAB
ANION GAP SERPL CALC-SCNC: 8 MMOL/L (ref 8–16)
BASOPHILS # BLD AUTO: 0.03 K/UL (ref 0–0.2)
BASOPHILS # BLD AUTO: 0.04 K/UL (ref 0–0.2)
BASOPHILS NFR BLD: 0.4 % (ref 0–1.9)
BASOPHILS NFR BLD: 0.5 % (ref 0–1.9)
BUN SERPL-MCNC: 33 MG/DL (ref 8–23)
CALCIUM SERPL-MCNC: 8.5 MG/DL (ref 8.7–10.5)
CHLORIDE SERPL-SCNC: 111 MMOL/L (ref 95–110)
CO2 SERPL-SCNC: 22 MMOL/L (ref 23–29)
CREAT SERPL-MCNC: 1.3 MG/DL (ref 0.5–1.4)
DIFFERENTIAL METHOD: ABNORMAL
DIFFERENTIAL METHOD: ABNORMAL
EOSINOPHIL # BLD AUTO: 0.2 K/UL (ref 0–0.5)
EOSINOPHIL # BLD AUTO: 0.2 K/UL (ref 0–0.5)
EOSINOPHIL NFR BLD: 2.1 % (ref 0–8)
EOSINOPHIL NFR BLD: 2.2 % (ref 0–8)
ERYTHROCYTE [DISTWIDTH] IN BLOOD BY AUTOMATED COUNT: 14.9 % (ref 11.5–14.5)
ERYTHROCYTE [DISTWIDTH] IN BLOOD BY AUTOMATED COUNT: 15.5 % (ref 11.5–14.5)
EST. GFR  (AFRICAN AMERICAN): >60 ML/MIN/1.73 M^2
EST. GFR  (NON AFRICAN AMERICAN): 56 ML/MIN/1.73 M^2
ESTIMATED AVG GLUCOSE: 111 MG/DL (ref 68–131)
FERRITIN SERPL-MCNC: 48 NG/ML (ref 20–300)
GLUCOSE SERPL-MCNC: 90 MG/DL (ref 70–110)
HBA1C MFR BLD: 5.5 % (ref 4–5.6)
HCT VFR BLD AUTO: 21.3 % (ref 40–54)
HCT VFR BLD AUTO: 21.5 % (ref 40–54)
HCT VFR BLD AUTO: 22.5 % (ref 40–54)
HGB BLD-MCNC: 6.9 G/DL (ref 14–18)
HGB BLD-MCNC: 7 G/DL (ref 14–18)
HGB BLD-MCNC: 7.3 G/DL (ref 14–18)
IMM GRANULOCYTES # BLD AUTO: 0.03 K/UL (ref 0–0.04)
IMM GRANULOCYTES # BLD AUTO: 0.03 K/UL (ref 0–0.04)
IMM GRANULOCYTES NFR BLD AUTO: 0.4 % (ref 0–0.5)
IMM GRANULOCYTES NFR BLD AUTO: 0.4 % (ref 0–0.5)
IRON SERPL-MCNC: 39 UG/DL (ref 45–160)
LYMPHOCYTES # BLD AUTO: 1.4 K/UL (ref 1–4.8)
LYMPHOCYTES # BLD AUTO: 1.9 K/UL (ref 1–4.8)
LYMPHOCYTES NFR BLD: 20 % (ref 18–48)
LYMPHOCYTES NFR BLD: 24.8 % (ref 18–48)
MCH RBC QN AUTO: 30.3 PG (ref 27–31)
MCH RBC QN AUTO: 31.1 PG (ref 27–31)
MCHC RBC AUTO-ENTMCNC: 32.4 G/DL (ref 32–36)
MCHC RBC AUTO-ENTMCNC: 32.6 G/DL (ref 32–36)
MCV RBC AUTO: 93 FL (ref 82–98)
MCV RBC AUTO: 96 FL (ref 82–98)
MONOCYTES # BLD AUTO: 0.4 K/UL (ref 0.3–1)
MONOCYTES # BLD AUTO: 0.4 K/UL (ref 0.3–1)
MONOCYTES NFR BLD: 5.8 % (ref 4–15)
MONOCYTES NFR BLD: 5.9 % (ref 4–15)
NEUTROPHILS # BLD AUTO: 4.9 K/UL (ref 1.8–7.7)
NEUTROPHILS # BLD AUTO: 5 K/UL (ref 1.8–7.7)
NEUTROPHILS NFR BLD: 66.4 % (ref 38–73)
NEUTROPHILS NFR BLD: 71.1 % (ref 38–73)
NRBC BLD-RTO: 0 /100 WBC
NRBC BLD-RTO: 1 /100 WBC
PLATELET # BLD AUTO: 161 K/UL (ref 150–450)
PLATELET # BLD AUTO: 176 K/UL (ref 150–450)
PMV BLD AUTO: 10.1 FL (ref 9.2–12.9)
PMV BLD AUTO: 9.3 FL (ref 9.2–12.9)
POCT GLUCOSE: 117 MG/DL (ref 70–110)
POCT GLUCOSE: 86 MG/DL (ref 70–110)
POCT GLUCOSE: 93 MG/DL (ref 70–110)
POCT GLUCOSE: 97 MG/DL (ref 70–110)
POTASSIUM SERPL-SCNC: 4.2 MMOL/L (ref 3.5–5.1)
RBC # BLD AUTO: 2.25 M/UL (ref 4.6–6.2)
RBC # BLD AUTO: 2.28 M/UL (ref 4.6–6.2)
SATURATED IRON: 10 % (ref 20–50)
SODIUM SERPL-SCNC: 141 MMOL/L (ref 136–145)
TOTAL IRON BINDING CAPACITY: 383 UG/DL (ref 250–450)
TRANSFERRIN SERPL-MCNC: 259 MG/DL (ref 200–375)
WBC # BLD AUTO: 6.91 K/UL (ref 3.9–12.7)
WBC # BLD AUTO: 7.46 K/UL (ref 3.9–12.7)

## 2022-07-27 PROCEDURE — 99222 PR INITIAL HOSPITAL CARE,LEVL II: ICD-10-PCS | Mod: ,,, | Performed by: INTERNAL MEDICINE

## 2022-07-27 PROCEDURE — 37000008 HC ANESTHESIA 1ST 15 MINUTES: Performed by: INTERNAL MEDICINE

## 2022-07-27 PROCEDURE — D9220A PRA ANESTHESIA: Mod: ANES,,, | Performed by: ANESTHESIOLOGY

## 2022-07-27 PROCEDURE — D9220A PRA ANESTHESIA: Mod: CRNA,,, | Performed by: NURSE ANESTHETIST, CERTIFIED REGISTERED

## 2022-07-27 PROCEDURE — 83036 HEMOGLOBIN GLYCOSYLATED A1C: CPT | Performed by: NURSE PRACTITIONER

## 2022-07-27 PROCEDURE — 85025 COMPLETE CBC W/AUTO DIFF WBC: CPT | Performed by: INTERNAL MEDICINE

## 2022-07-27 PROCEDURE — 63600175 PHARM REV CODE 636 W HCPCS: Performed by: NURSE ANESTHETIST, CERTIFIED REGISTERED

## 2022-07-27 PROCEDURE — 25000003 PHARM REV CODE 250: Performed by: INTERNAL MEDICINE

## 2022-07-27 PROCEDURE — 36415 COLL VENOUS BLD VENIPUNCTURE: CPT

## 2022-07-27 PROCEDURE — 80048 BASIC METABOLIC PNL TOTAL CA: CPT

## 2022-07-27 PROCEDURE — 85014 HEMATOCRIT: CPT

## 2022-07-27 PROCEDURE — 94760 N-INVAS EAR/PLS OXIMETRY 1: CPT

## 2022-07-27 PROCEDURE — 36415 COLL VENOUS BLD VENIPUNCTURE: CPT | Performed by: INTERNAL MEDICINE

## 2022-07-27 PROCEDURE — 63600175 PHARM REV CODE 636 W HCPCS: Performed by: NURSE PRACTITIONER

## 2022-07-27 PROCEDURE — 25000003 PHARM REV CODE 250: Performed by: NURSE PRACTITIONER

## 2022-07-27 PROCEDURE — 43235 PR EGD, FLEX, DIAGNOSTIC: ICD-10-PCS | Mod: ,,, | Performed by: INTERNAL MEDICINE

## 2022-07-27 PROCEDURE — 82728 ASSAY OF FERRITIN: CPT | Performed by: INTERNAL MEDICINE

## 2022-07-27 PROCEDURE — 85018 HEMOGLOBIN: CPT

## 2022-07-27 PROCEDURE — D9220A PRA ANESTHESIA: ICD-10-PCS | Mod: CRNA,,, | Performed by: NURSE ANESTHETIST, CERTIFIED REGISTERED

## 2022-07-27 PROCEDURE — 25000003 PHARM REV CODE 250: Performed by: EMERGENCY MEDICINE

## 2022-07-27 PROCEDURE — D9220A PRA ANESTHESIA: ICD-10-PCS | Mod: ANES,,, | Performed by: ANESTHESIOLOGY

## 2022-07-27 PROCEDURE — 36415 COLL VENOUS BLD VENIPUNCTURE: CPT | Performed by: NURSE PRACTITIONER

## 2022-07-27 PROCEDURE — 85025 COMPLETE CBC W/AUTO DIFF WBC: CPT | Mod: 91 | Performed by: NURSE PRACTITIONER

## 2022-07-27 PROCEDURE — 43235 EGD DIAGNOSTIC BRUSH WASH: CPT | Performed by: INTERNAL MEDICINE

## 2022-07-27 PROCEDURE — 84466 ASSAY OF TRANSFERRIN: CPT | Performed by: INTERNAL MEDICINE

## 2022-07-27 PROCEDURE — 99222 1ST HOSP IP/OBS MODERATE 55: CPT | Mod: ,,, | Performed by: INTERNAL MEDICINE

## 2022-07-27 PROCEDURE — C9113 INJ PANTOPRAZOLE SODIUM, VIA: HCPCS | Performed by: NURSE PRACTITIONER

## 2022-07-27 PROCEDURE — 43235 EGD DIAGNOSTIC BRUSH WASH: CPT | Mod: ,,, | Performed by: INTERNAL MEDICINE

## 2022-07-27 RX ORDER — PROPOFOL 10 MG/ML
VIAL (ML) INTRAVENOUS
Status: DISCONTINUED | OUTPATIENT
Start: 2022-07-27 | End: 2022-07-27

## 2022-07-27 RX ORDER — SODIUM CHLORIDE 9 MG/ML
INJECTION, SOLUTION INTRAVENOUS CONTINUOUS
Status: DISCONTINUED | OUTPATIENT
Start: 2022-07-27 | End: 2022-07-27 | Stop reason: HOSPADM

## 2022-07-27 RX ORDER — GLUCAGON 1 MG
1 KIT INJECTION
Status: DISCONTINUED | OUTPATIENT
Start: 2022-07-27 | End: 2022-07-27 | Stop reason: HOSPADM

## 2022-07-27 RX ORDER — INSULIN ASPART 100 [IU]/ML
0-5 INJECTION, SOLUTION INTRAVENOUS; SUBCUTANEOUS EVERY 6 HOURS PRN
Status: DISCONTINUED | OUTPATIENT
Start: 2022-07-27 | End: 2022-07-27 | Stop reason: HOSPADM

## 2022-07-27 RX ORDER — PANTOPRAZOLE SODIUM 40 MG/1
40 TABLET, DELAYED RELEASE ORAL 2 TIMES DAILY
Qty: 60 TABLET | Refills: 11 | Status: SHIPPED | OUTPATIENT
Start: 2022-07-27 | End: 2024-01-11

## 2022-07-27 RX ORDER — MORPHINE SULFATE 2 MG/ML
2 INJECTION, SOLUTION INTRAMUSCULAR; INTRAVENOUS EVERY 4 HOURS PRN
Status: DISCONTINUED | OUTPATIENT
Start: 2022-07-27 | End: 2022-07-27 | Stop reason: HOSPADM

## 2022-07-27 RX ORDER — MORPHINE SULFATE 2 MG/ML
4 INJECTION, SOLUTION INTRAMUSCULAR; INTRAVENOUS EVERY 4 HOURS PRN
Status: DISCONTINUED | OUTPATIENT
Start: 2022-07-27 | End: 2022-07-27 | Stop reason: HOSPADM

## 2022-07-27 RX ORDER — SUCRALFATE 1 G/10ML
1 SUSPENSION ORAL EVERY 6 HOURS
Qty: 400 ML | Refills: 0 | Status: SHIPPED | OUTPATIENT
Start: 2022-07-27 | End: 2022-08-06

## 2022-07-27 RX ADMIN — PRAVASTATIN SODIUM 20 MG: 10 TABLET ORAL at 11:07

## 2022-07-27 RX ADMIN — ALUMINUM HYDROXIDE, MAGNESIUM HYDROXIDE, AND SIMETHICONE 30 ML: 200; 200; 20 SUSPENSION ORAL at 05:07

## 2022-07-27 RX ADMIN — PANTOPRAZOLE SODIUM 40 MG: 40 INJECTION, POWDER, LYOPHILIZED, FOR SOLUTION INTRAVENOUS at 11:07

## 2022-07-27 RX ADMIN — SUCRALFATE 1 G: 1 SUSPENSION ORAL at 11:07

## 2022-07-27 RX ADMIN — ALUMINUM HYDROXIDE, MAGNESIUM HYDROXIDE, AND SIMETHICONE 30 ML: 200; 200; 20 SUSPENSION ORAL at 11:07

## 2022-07-27 RX ADMIN — SUCRALFATE 1 G: 1 SUSPENSION ORAL at 12:07

## 2022-07-27 RX ADMIN — SODIUM CHLORIDE: 0.9 INJECTION, SOLUTION INTRAVENOUS at 02:07

## 2022-07-27 RX ADMIN — PROPOFOL 130 MG: 10 INJECTION, EMULSION INTRAVENOUS at 09:07

## 2022-07-27 RX ADMIN — SODIUM CHLORIDE: 0.9 INJECTION, SOLUTION INTRAVENOUS at 09:07

## 2022-07-27 RX ADMIN — SUCRALFATE 1 G: 1 SUSPENSION ORAL at 05:07

## 2022-07-27 RX ADMIN — DILTIAZEM HYDROCHLORIDE 180 MG: 180 CAPSULE, COATED, EXTENDED RELEASE ORAL at 11:07

## 2022-07-27 NOTE — CARE UPDATE
07/27/22 0046   Patient Assessment/Suction   Level of Consciousness (AVPU) responds to voice   PRE-TX-O2   O2 Device (Oxygen Therapy) room air   SpO2 97 %   Pulse Oximetry Type Intermittent   Pulse 72   Resp 18

## 2022-07-27 NOTE — ASSESSMENT & PLAN NOTE
Patient's anemia is currently uncontrolled. Has recieved 2 units of PRBCs on 7/27/2022. Etiology likely d/t GI bleed  Current CBC reviewed-   Lab Results   Component Value Date    HGB 6.1 (L) 07/26/2022    HCT 17.7 (LL) 07/26/2022     Monitor serial CBC and transfuse if patient becomes hemodynamically unstable, symptomatic or H/H drops below 7/21.

## 2022-07-27 NOTE — PROGRESS NOTES
EGD done.  No active bleeding.  Clean based duodenal ulcer noted.  Recommend BID PPI.  Recommend sucralfate x 10 days.  Resume diet.  OK to discharge home and resume eliquis.  Follow up in office.  Recommend repeat EGD in 8 weeks to check healing and take biopsies.  Will sign off.  Call for questions.

## 2022-07-27 NOTE — [employer]ATION PATIENT INSTRUCTIONS
Discharge Summary/Instructions after an Endoscopic Procedure  Patient Name: Willi Diaz  Patient MRN: [id number]  Patient YOB: 1955 Wednesday, July 27, 2022  Naseem Haney MD  Dear patient,  As a result of recent federal legislation (The Federal Cures Act), you may   receive lab or pathology results from your procedure in your [facility]   account before your physician is able to contact you. Your physician or   their representative will relay the results to you with their   recommendations at their soonest availability.  Thank you,  RESTRICTIONS:  During your procedure today, you received medications for sedation.  These   medications may affect your judgment, balance and coordination.  Therefore,   for 24 hours, you have the following restrictions:   - DO NOT drive a car, operate machinery, make legal/financial decisions,   sign important papers or drink alcohol.    ACTIVITY:  Today: no heavy lifting, straining or running due to procedural   sedation/anesthesia.  The following day: return to full activity including work.  DIET:  Eat and drink normally unless instructed otherwise.     TREATMENT FOR COMMON SIDE EFFECTS:  - Mild abdominal pain, nausea, belching, bloating or excessive gas:  rest,   eat lightly and use a heating pad.  - Sore Throat: treat with throat lozenges and/or gargle with warm salt   water.  - Because air was used during the procedure, expelling large amounts of air   from your rectum or belching is normal.  - If a bowel prep was taken, you may not have a bowel movement for 1-3 days.    This is normal.  SYMPTOMS TO WATCH FOR AND REPORT TO YOUR PHYSICIAN:  1. Abdominal pain or bloating, other than gas cramps.  2. Chest pain.  3. Back pain.  4. Signs of infection such as: chills or fever occurring within 24 hours   after the procedure.  5. Rectal bleeding, which would show as bright red, maroon, or black stools.   (A tablespoon of blood from the rectum is not serious, especially if    hemorrhoids are present.)  6. Vomiting.  7. Weakness or dizziness.  GO DIRECTLY TO THE NEAREST EMERGENCY ROOM IF YOU HAVE ANY OF THE FOLLOWING:      Difficulty breathing              Chills and/or fever over 101 F   Persistent vomiting and/or vomiting blood   Severe abdominal pain   Severe chest pain   Black, tarry stools   Bleeding- more than one tablespoon   Any other symptom or condition that you feel may need urgent attention  Your doctor recommends these additional instructions:  If any biopsies were taken, your doctors clinic will contact you in 1 to 2   weeks with any results.  - Return patient to [facility] for [facility] care.   - Resume regular diet.   - Continue present medications.   - No aspirin, ibuprofen, naproxen, or other non-steroidal anti-inflammatory   drugs.   - Await pathology results.   - Repeat upper endoscopy in 8 weeks to check healing.   - Follow an antireflux regimen.   - Use Protonix (pantoprazole) 40 mg PO BID.   - Use sucralfate tablets 1 gram PO QID for 10 days.   - Return to GI office after studies are complete.  For questions, problems or results please call your physician - Naseem Haney MD at Work:  [phone].  [facility], EMERGENCY ROOM PHONE NUMBER: [phone]  IF A COMPLICATION OR EMERGENCY SITUATION ARISES AND YOU ARE UNABLE TO REACH   YOUR PHYSICIAN - GO DIRECTLY TO THE EMERGENCY ROOM.  Naseem Haney MD  7/27/2022 9:50:45 AM  This report has been verified and signed electronically.  Dear patient,  As a result of recent federal legislation (The Federal Cures Act), you may   receive lab or pathology results from your procedure in your [facility]   account before your physician is able to contact you. Your physician or   their representative will relay the results to you with their   recommendations at their soonest availability.  Thank you,  PROVATION

## 2022-07-27 NOTE — PROGRESS NOTES
Patient arrives to unit at this time via stretcher.  1 unit PRBC transfusing at this time at 200 ml/hr.  Patient wearing glasses.  Patient ambulatory with stand by assistance.  Family at bedside.  Patient oriented to fall precautions, call light and room.  Patient has no skin abnormalities noted at this time.  Fall precautions in place.  Patient instructed to call for assistance with getting out of bed.

## 2022-07-27 NOTE — ANESTHESIA POSTPROCEDURE EVALUATION
Anesthesia Post Evaluation    Patient: Willi Diaz    Procedure(s) Performed: Procedure(s) (LRB):  EGD (ESOPHAGOGASTRODUODENOSCOPY) (N/A)    Final Anesthesia Type: general      Patient location during evaluation: PACU  Patient participation: Yes- Able to Participate  Level of consciousness: awake and alert and oriented  Post-procedure vital signs: reviewed and stable  Pain management: adequate  Airway patency: patent    PONV status at discharge: No PONV  Anesthetic complications: no      Cardiovascular status: blood pressure returned to baseline and stable  Respiratory status: unassisted and spontaneous ventilation  Hydration status: euvolemic  Follow-up not needed.          Vitals Value Taken Time   /84 07/27/22 1227   Temp 36.4 °C (97.5 °F) 07/27/22 1227   Pulse 71 07/27/22 1227   Resp 16 07/27/22 1227   SpO2 100 % 07/27/22 1227         Event Time   Out of Recovery 07/27/2022 10:20:00         Pain/Alayna Score: Alayna Score: 10 (7/27/2022 10:05 AM)

## 2022-07-27 NOTE — H&P
"[facility] Medicine  History & Physical    Patient Name: Willi Diaz  MRN: [id number]  Patient Class: IP- Inpatient  Admission Date: 7/26/2022  Attending Physician: Kami Pfeiffer MD   Primary Care Provider: Roya Burnett Jr, MD         Patient information was obtained from patient, past medical records and ER records.     Subjective:     Principal Problem:GI bleed    Chief Complaint:   Chief Complaint   Patient presents with    Dizziness     Reports dizziness x 2 days -- sent for further eval from urgent care. Denies HA or weakness.         HPI: Willi Diaz is a 66 y.o. male with a         PMHx of HTN and DM II who presents to the ED with his step daughter for evaluation of dizziness with an associated unsteady gait.  Patient was evaluated for same Sx by urgent care and was referred to the ED for further workup and Tx.  He reports Sx began with a recent upset stomach for which he took Pepto-Bismol 4 days ago followed by episodic black stools with 1 episode today.  He then took MiraLAX to stimulate a BM to "get rid" of the black stool.  Patient notes a recent life stressor in which his wife passed away last week.  Since his loss he expresses a decreased appetite, visual disturbances (bright lights), worsening dizziness that is improved with sitting still, and tremors to the bilateral hands.  The patient denies blurred or double vision, SOB, HA, weakness, ear pain or any other symptoms at this time.  No documented PSHx.  Current medication changes include the addition of Eliquis for management of A-fib.        Past Medical History:   Diagnosis Date    Diabetes mellitus     type 2    Hypertension        History reviewed. No pertinent surgical history.    Review of patient's allergies indicates:   Allergen Reactions    Pcn [penicillins] Itching and Swelling       No current facility-administered medications on file prior to encounter.     Current Outpatient Medications " on File Prior to Encounter   Medication Sig    allopurinoL (ZYLOPRIM) 300 MG tablet Take 300 mg by mouth once daily.    diltiaZEM (CARDIZEM CD) 180 MG 24 hr capsule Take 180 mg by mouth once daily.    ELIQUIS 5 mg Tab Take 5 mg by mouth 2 (two) times daily.    gabapentin (NEURONTIN) 600 MG tablet TAKE 1 TABLET BY MOUTH TWICE A DAY    losartan-hydrochlorothiazide 50-12.5 mg (HYZAAR) 50-12.5 mg per tablet Take 1 tablet by mouth once daily.    meloxicam (MOBIC) 15 MG tablet TAKE 1 TABLET BY MOUTH EVERY DAY    metFORMIN (GLUCOPHAGE) 1000 MG tablet metformin 1,000 mg tablet     Family History       Family history is unknown by patient.          Tobacco Use    Smoking status: Never Smoker    Smokeless tobacco: Not on file   Substance and Sexual Activity    Alcohol use: Yes     Alcohol/week: 30.0 standard drinks     Types: 30 Cans of beer per week    Drug use: Not on file    Sexual activity: Not on file     Review of Systems   Constitutional:  Positive for fatigue. Negative for activity change, appetite change, chills and fever.   HENT:  Negative for congestion, sore throat and trouble swallowing.    Eyes:  Negative for photophobia and visual disturbance.   Respiratory:  Negative for cough, chest tightness and shortness of breath.    Cardiovascular:  Negative for chest pain, palpitations and leg swelling.   Gastrointestinal:  Positive for abdominal pain, blood in stool and nausea. Negative for diarrhea.   Genitourinary:  Negative for dysuria, flank pain and hematuria.   Musculoskeletal:  Negative for back pain.   Neurological:  Positive for light-headedness. Negative for dizziness, weakness and headaches.   Psychiatric/Behavioral:  Negative for confusion.    Objective:     Vital Signs (Most Recent):  Temp: 98.2 °F (36.8 °C) (07/26/22 2218)  Pulse: 72 (07/26/22 2218)  Resp: 20 (07/26/22 2218)  BP: (!) 164/78 (07/26/22 2218)  SpO2: 96 % (07/26/22 2218)   Vital Signs (24h Range):  Temp:  [98.2 °F (36.8 °C)-98.5 °F  (36.9 °C)] 98.2 °F (36.8 °C)  Pulse:  [72-89] 72  Resp:  [16-20] 20  SpO2:  [96 %-100 %] 96 %  BP: ()/(55-86) 164/78     Weight: 110.7 kg (244 lb)  Body mass index is 30.5 kg/m².    Physical Exam  Vitals reviewed.   Constitutional:       Appearance: Normal appearance. He is normal weight.   HENT:      Head: Normocephalic.      Mouth/Throat:      Mouth: Mucous membranes are moist.      Pharynx: Oropharynx is clear.   Eyes:      Pupils: Pupils are equal, round, and reactive to light.   Cardiovascular:      Rate and Rhythm: Normal rate and regular rhythm.      Pulses: Normal pulses.      Heart sounds: Normal heart sounds.   Pulmonary:      Effort: Pulmonary effort is normal.      Breath sounds: Normal breath sounds.   Abdominal:      General: Bowel sounds are normal.      Palpations: Abdomen is soft.   Musculoskeletal:         General: Normal range of motion.      Cervical back: Normal range of motion.   Skin:     General: Skin is warm and dry.      Coloration: Skin is pale.   Neurological:      Mental Status: He is alert and oriented to person, place, and time. Mental status is at baseline.   Psychiatric:         Mood and Affect: Mood normal.         Speech: Speech normal.         Behavior: Behavior normal.         CRANIAL NERVES     CN III, IV, VI   Pupils are equal, round, and reactive to light.     Significant Labs: All pertinent labs within the past 24 hours have been reviewed.  CBC:   Recent Labs   Lab 07/26/22  1835   WBC 11.44   HGB 6.1*   HCT 17.7*        CMP:   Recent Labs   Lab 07/26/22  1835      K 4.0      CO2 23   *   BUN 58*   CREATININE 1.7*   CALCIUM 9.1   ANIONGAP 11   EGFRNONAA 41*       Significant Imaging: I have reviewed all pertinent imaging results/findings within the past 24 hours.    Assessment/Plan:     * GI bleed  GI bleed    -consult GI  -NPO  -IV Protonix  -Antiemetics prn  -Trend CBC  -Hold Eliquis      Acute blood loss anemia  Patient's anemia is currently  uncontrolled. Has recieved 2 units of PRBCs on 7/27/2022. Etiology likely d/t GI bleed  Current CBC reviewed-   Lab Results   Component Value Date    HGB 6.1 (L) 07/26/2022    HCT 17.7 (LL) 07/26/2022     Monitor serial CBC and transfuse if patient becomes hemodynamically unstable, symptomatic or H/H drops below 7/21.         Hypertension associated with diabetes  Chronic, controlled.  Latest blood pressure and vitals reviewed-   Temp:  [98.2 °F (36.8 °C)-98.5 °F (36.9 °C)]   Pulse:  [72-89]   Resp:  [16-20]   BP: ()/(55-86)   SpO2:  [96 %-100 %] .   Home meds for hypertension were reviewed and noted below.   Hypertension Medications             diltiaZEM (CARDIZEM CD) 180 MG 24 hr capsule Take 180 mg by mouth once daily.    losartan-hydrochlorothiazide 50-12.5 mg (HYZAAR) 50-12.5 mg per tablet Take 1 tablet by mouth once daily.          While in the hospital, will manage blood pressure as follows; Adjust home antihypertensive regimen as follows- hold medications if hypotensive    Will utilize p.r.n. blood pressure medication only if patient's blood pressure greater than  180/110 and he develops symptoms such as worsening chest pain or shortness of breath.      Type 2 diabetes mellitus  Patient's FSGs are controlled on current medication regimen.  Last A1c reviewed- No results found for: LABA1C, HGBA1C  Most recent fingerstick glucose reviewed- Recent Labs   Lab 07/27/22  0007   POCTGLUCOSE 117*     Current correctional scale  Medium  Maintain anti-hyperglycemic dose as follows-   Antihyperglycemics (From admission, onward)            None        Hold Oral hypoglycemics while patient is in the hospital.    Atrial fibrillation  Patient with Paroxysmal (<7 days) atrial fibrillation which is controlled currently with Calcium Channel Blocker. Patient is currently in sinus rhythm.[id number] Score: 2. HASBLED Score: Unable to calculate. Anticoagulation indicated. Anticoagulation done with Eliquis .    Hold Eliquis  due to active bleeding          VTE Risk Mitigation (From admission, onward)         Ordered     Place sequential compression device  Until discontinued         07/26/22 2029     IP VTE LOW RISK PATIENT  Once         07/26/22 2029                   Pilar Christine NP  Department of Hospital Medicine   [facility]

## 2022-07-27 NOTE — TELEPHONE ENCOUNTER
Reached out to patient to schedule 8 week repeat of EGD. Procedure at 9/21/2022 at 8:30am. Miralax prep instructions sent via portal.

## 2022-07-27 NOTE — ED NOTES
Patient transferred to [facility] late secondary blood being started in the ED. Patient did not have any transfusion reaction.

## 2022-07-27 NOTE — H&P
"[facility]  History & Physical    Patient Name: Willi Diaz  MRN: [id number]  Patient Class: IP- Inpatient  Admission Date: 7/26/2022  Attending Physician: Kami Pfeiffer MD   Primary Care Provider: Roya Burnett Jr, MD         Patient information was obtained from patient, past medical records and ER records.     Subjective:     Principal Problem:GI bleed    Chief Complaint:   Chief Complaint   Patient presents with    Dizziness     Reports dizziness x 2 days -- sent for further eval from urgent care. Denies HA or weakness.         HPI: Willi Diaz is a 66 y.o. male with a past medical history of HTN, DM type II, and A-fib on Eliquis presenting with dizziness and dark tarry stool x 4 days. Patient states his wife passed away last week causing him to have decreased appetite and  stomach ache. Patient took Pepto-Bismol and noticed dark tarry stool that persisted after he stopped Pepto Bismol. Patient states he took Miralax to "get rid of black stool" that only worsened symptoms. He reports feeling weak, dizziness when standing and abdominal pain. ED work up revealed Hbg 6.1 and elevated Creatinine 1.7. Patient initially hypotensive upon arrival to ED and received 2L IV NS. Patient's BP improved with IVF. Patient received IV Protonix & 2 units PRBC's in ED. ED provider discussed with LINA Hopper, who agreed to consult and patient referred to hospital medicine. Patient complaining of generalized abdominal pain and dizziness. Patient denies shortness of breath, chest pain, vomiting, fever and chills. Patient will be admitted to hospital medicine for further evaluation and management.                 Past Medical History:   Diagnosis Date    Diabetes mellitus     type 2    Hypertension        History reviewed. No pertinent surgical history.    Review of patient's allergies indicates:   Allergen Reactions    Pcn [penicillins] Itching and Swelling       No " current facility-administered medications on file prior to encounter.     Current Outpatient Medications on File Prior to Encounter   Medication Sig    allopurinoL (ZYLOPRIM) 300 MG tablet Take 300 mg by mouth once daily.    diltiaZEM (CARDIZEM CD) 180 MG 24 hr capsule Take 180 mg by mouth once daily.    ELIQUIS 5 mg Tab Take 5 mg by mouth 2 (two) times daily.    gabapentin (NEURONTIN) 600 MG tablet TAKE 1 TABLET BY MOUTH TWICE A DAY    losartan-hydrochlorothiazide 50-12.5 mg (HYZAAR) 50-12.5 mg per tablet Take 1 tablet by mouth once daily.    meloxicam (MOBIC) 15 MG tablet TAKE 1 TABLET BY MOUTH EVERY DAY    metFORMIN (GLUCOPHAGE) 1000 MG tablet metformin 1,000 mg tablet     Family History       Family history is unknown by patient.          Tobacco Use    Smoking status: Never Smoker    Smokeless tobacco: Not on file   Substance and Sexual Activity    Alcohol use: Yes     Alcohol/week: 30.0 standard drinks     Types: 30 Cans of beer per week    Drug use: Not on file    Sexual activity: Not on file     Review of Systems   Constitutional:  Positive for fatigue. Negative for activity change, appetite change, chills and fever.   HENT:  Negative for congestion, sore throat and trouble swallowing.    Eyes:  Negative for photophobia and visual disturbance.   Respiratory:  Negative for cough, chest tightness and shortness of breath.    Cardiovascular:  Negative for chest pain, palpitations and leg swelling.   Gastrointestinal:  Positive for blood in stool. Negative for abdominal pain, diarrhea and nausea.   Genitourinary:  Negative for dysuria, flank pain and hematuria.   Musculoskeletal:  Negative for back pain.   Neurological:  Positive for light-headedness. Negative for dizziness, weakness and headaches.   Psychiatric/Behavioral:  Negative for confusion.    Objective:     Vital Signs (Most Recent):  Temp: 97.7 °F (36.5 °C) (07/27/22 0015)  Pulse: 75 (07/27/22 0015)  Resp: 18 (07/27/22 0015)  BP: (!) 110/53  (07/27/22 0015)  SpO2: 96 % (07/26/22 6158)   Vital Signs (24h Range):  Temp:  [97.7 °F (36.5 °C)-98.5 °F (36.9 °C)] 97.7 °F (36.5 °C)  Pulse:  [71-89] 75  Resp:  [16-20] 18  SpO2:  [96 %-100 %] 96 %  BP: ()/(53-86) 110/53     Weight: 110.7 kg (244 lb)  Body mass index is 30.5 kg/m².    Physical Exam  Vitals reviewed.   Constitutional:       Appearance: Normal appearance. He is normal weight.   HENT:      Head: Normocephalic.      Mouth/Throat:      Mouth: Mucous membranes are moist.      Pharynx: Oropharynx is clear.   Eyes:      Pupils: Pupils are equal, round, and reactive to light.   Cardiovascular:      Rate and Rhythm: Normal rate and regular rhythm.      Pulses: Normal pulses.      Heart sounds: Normal heart sounds.   Pulmonary:      Effort: Pulmonary effort is normal.      Breath sounds: Normal breath sounds.   Abdominal:      General: Bowel sounds are normal.      Palpations: Abdomen is soft.   Musculoskeletal:         General: Normal range of motion.      Cervical back: Normal range of motion.   Skin:     General: Skin is warm and dry.      Coloration: Skin is pale.   Neurological:      Mental Status: He is alert and oriented to person, place, and time. Mental status is at baseline.   Psychiatric:         Mood and Affect: Mood normal.         Speech: Speech normal.         Behavior: Behavior normal.         CRANIAL NERVES     CN III, IV, VI   Pupils are equal, round, and reactive to light.     Significant Labs: All pertinent labs within the past 24 hours have been reviewed.  CBC:   Recent Labs   Lab 07/26/22  1835   WBC 11.44   HGB 6.1*   HCT 17.7*        CMP:   Recent Labs   Lab 07/26/22  1835      K 4.0      CO2 23   *   BUN 58*   CREATININE 1.7*   CALCIUM 9.1   ANIONGAP 11   EGFRNONAA 41*       Significant Imaging: I have reviewed all pertinent imaging results/findings within the past 24 hours.    Assessment/Plan:     * GI bleed  GI bleed    -consult GI  -NPO  -IV  Protonix  -Antiemetics prn  -Trend CBC  -Hold Eliquis      Acute blood loss anemia  Patient's anemia is currently uncontrolled. Has recieved 2 units of PRBCs on 7/27/2022. Etiology likely d/t GI bleed  Current CBC reviewed-   Lab Results   Component Value Date    HGB 6.1 (L) 07/26/2022    HCT 17.7 (LL) 07/26/2022     Monitor serial CBC and transfuse if patient becomes hemodynamically unstable, symptomatic or H/H drops below 7/21.         Hypertension associated with diabetes  Chronic, controlled.  Latest blood pressure and vitals reviewed-   Temp:  [98.2 °F (36.8 °C)-98.5 °F (36.9 °C)]   Pulse:  [72-89]   Resp:  [16-20]   BP: ()/(55-86)   SpO2:  [96 %-100 %] .   Home meds for hypertension were reviewed and noted below.   Hypertension Medications             diltiaZEM (CARDIZEM CD) 180 MG 24 hr capsule Take 180 mg by mouth once daily.    losartan-hydrochlorothiazide 50-12.5 mg (HYZAAR) 50-12.5 mg per tablet Take 1 tablet by mouth once daily.          While in the hospital, will manage blood pressure as follows; Adjust home antihypertensive regimen as follows- hold medications if hypotensive    Will utilize p.r.n. blood pressure medication only if patient's blood pressure greater than  180/110 and he develops symptoms such as worsening chest pain or shortness of breath.      Type 2 diabetes mellitus  Patient's FSGs are controlled on current medication regimen.  Last A1c reviewed- No results found for: LABA1C, HGBA1C  Most recent fingerstick glucose reviewed- Recent Labs   Lab 07/27/22  0007   POCTGLUCOSE 117*     Current correctional scale  Medium  Maintain anti-hyperglycemic dose as follows-   Antihyperglycemics (From admission, onward)            None        Hold Oral hypoglycemics while patient is in the hospital.    Atrial fibrillation  Patient with Paroxysmal (<7 days) atrial fibrillation which is controlled currently with Calcium Channel Blocker. Patient is currently in sinus rhythm.[id number] Score: 2.  HASBLED Score: Unable to calculate. Anticoagulation indicated. Anticoagulation done with Eliquis .    Hold Eliquis due to active bleeding          VTE Risk Mitigation (From admission, onward)         Ordered     Place sequential compression device  Until discontinued         07/26/22 2029     IP VTE LOW RISK PATIENT  Once         07/26/22 2029                   Pilar Christine NP  Department of Hospital Medicine   [facility]

## 2022-07-27 NOTE — PLAN OF CARE
[facility]  Initial Discharge Assessment       Primary Care Provider: Hussain Marcelo MD    Admission Diagnosis: GI bleed [K92.2]  Gastrointestinal hemorrhage, unspecified gastrointestinal hemorrhage type [K92.2]    Admission Date: 7/26/2022  Expected Discharge Date: 7/27/2022    Discharge Barriers Identified: None    Payor: [employer]ARE / Plan: [employer] 65 / Product Type: Medicare Advantage /     Extended Emergency Contact Information  Primary Emergency Contact: Abigail Diaz  Mobile Phone: [phone]  Relation: Sister   needed? No    Discharge Plan A: Home with family  Discharge Plan B: Home      [id number] - [place], [place] - [address]  [address]  [place] [address]  Phone: [phone] Fax: [phone]    Completed DC assessment with pt at bedside. Verified information on facesheet as correct. Denies POA. NOK 3 children. Lives at listed address with his daughter. PCP is Dr. Marcelo. Pharm verified. Denies hh/hd. DME- glucometer. Independent with activities. Drives himself to apts. Daughter to provide transportation home upon DC. DC plan is home. CM following    Initial Assessment (most recent)     Adult Discharge Assessment - 07/27/22 1200        Discharge Assessment    Assessment Type Discharge Planning Assessment     Confirmed/corrected address, phone number and insurance Yes     Confirmed Demographics Correct on Facesheet     Source of Information patient     Communicated BRYAN with patient/caregiver Yes     Reason For Admission GI bleed     Lives With child(lucy), adult     Facility Arrived From: home     Do you expect to return to your current living situation? Yes     Do you have help at home or someone to help you manage your care at home? Yes     Prior to hospitilization cognitive status: Alert/Oriented     Current cognitive status: Alert/Oriented     Walking or Climbing Stairs Difficulty none     Dressing/Bathing Difficulty none      Equipment Currently Used at Home glucometer     Readmission within 30 days? No     Patient currently being followed by outpatient case management? No     Do you currently have service(s) that help you manage your care at home? No     Do you take prescription medications? Yes     Do you have prescription coverage? Yes     Coverage PHN     Do you have any problems affording any of your prescribed medications? No     Is the patient taking medications as prescribed? yes     Who is going to help you get home at discharge? daughter     How do you get to doctors appointments? car, drives self;family or friend will provide     Are you on dialysis? No     Do you take coumadin? No     Discharge Plan A Home with family     Discharge Plan B Home     DME Needed Upon Discharge  none     Discharge Plan discussed with: Patient     Discharge Barriers Identified None

## 2022-07-27 NOTE — ED NOTES
Patient is aware that he can have clear liquids until midnight. Patient verbalized understanding.

## 2022-07-27 NOTE — CONSULTS
"[facility]     CC: Melena, anemia    HPI 66 y.o. male with a past medical history of HTN, DM type II, and A-fib on Eliquis presenting with dizziness and dark tarry stool x 4 days. Patient states his wife passed away last week causing him to have decreased appetite and  stomach ache. Patient took Pepto-Bismol and noticed dark tarry stool that persisted after he stopped Pepto Bismol. Patient states he took Miralax to "get rid of black stool" that only worsened symptoms. He reports feeling weak, dizziness when standing and abdominal pain. ED work up revealed Hbg 6.1 and elevated Creatinine 1.7. Patient initially hypotensive upon arrival to ED and received 2L IV NS. Patient's BP improved with IVF. Patient received IV Protonix & 2 units PRBC's in ED. ED provider discussed with Dr. Blas GI, who agreed to consult and patient referred to hospital medicine. Patient complaining of generalized abdominal pain and dizziness. Patient denies shortness of breath, chest pain, vomiting, fever and chills. Patient will be admitted to hospital medicine for further evaluation and management.     FURTHER HISTORY:  Above obtained from independent review of records from admitting provider as well as from direct discussion with nursing who states patient without complaint of abdominal pain.  In addition, on my interview, I note the following:  Patient complains of some upset stomach that began on Friday.  The next day he had some black stools and felt weak.  He also took some Pepto Bismol around that time but he states that black stools persisted despite cessation of Pepto.  He has never had EGD.  Denies NSAIDs.  He states that he recently started eliquis a couple of months ago.  Last dose yesterday morning.  He denies BRBPR or hematemesis. No abdominal pain.  Last colonoscopy was 2 years ago per the patient and he had polyps.       Past Medical History:   Diagnosis Date    Diabetes mellitus     type 2    Hypertension  " "      History reviewed. No pertinent surgical history.    Social History     Tobacco Use    Smoking status: Never Smoker   Substance Use Topics    Alcohol use: Yes     Alcohol/week: 30.0 standard drinks     Types: 30 Cans of beer per week       Family History   Family history unknown: Yes       Review of Systems  General ROS: negative for - chills, fever or weight loss  Psychological ROS: negative for - hallucination, depression or suicidal ideation  Ophthalmic ROS: negative for - blurry vision, photophobia or eye pain  ENT ROS: negative for - epistaxis, sore throat or rhinorrhea  Respiratory ROS: no cough, shortness of breath, or wheezing  Cardiovascular ROS: no chest pain or dyspnea on exertion  Gastrointestinal ROS: + melena  Genito-Urinary ROS: no dysuria, trouble voiding, or hematuria  Musculoskeletal ROS: negative for - arthralgia, myalgia, weakness  Neurological ROS: no syncope or seizures; no ataxia  Dermatological ROS: negative for pruritis, rash and jaundice    Physical Examination  /72 (BP Location: Right arm, Patient Position: Lying)   Pulse 67   Temp 97.8 °F (36.6 °C) (Oral)   Resp 19   Ht 6' 3" (1.905 m)   Wt 110.7 kg (244 lb)   SpO2 100%   BMI 30.50 kg/m²   General appearance: alert, cooperative, no distress  HENT: Normocephalic, atraumatic, neck symmetrical, no nasal discharge   Eyes: conjunctivae/corneas clear, PERRL, EOM's intact, sclera anicteric  Lungs: clear to auscultation bilaterally, no dullness to percussion bilaterally, symmetric expansion, breathing unlabored  Heart: regular rate and rhythm without rub; no displacement of the PMI   Abdomen: soft, NT  Extremities: extremities symmetric; no clubbing, cyanosis, or edema  Integument: Skin color, texture, turgor normal; no rashes; hair distrubution normal, no jaundice  Neurologic: Alert and oriented X 3, no focal sensory or motor neurologic deficits  Psychiatric: no pressured speech; normal affect; no evidence of impaired " cognition, no anxiety/depression     Labs:  Lab Results   Component Value Date    WBC 7.46 07/27/2022    HGB 6.9 (L) 07/27/2022    HCT 21.3 (L) 07/27/2022    MCV 93 07/27/2022     07/27/2022         CMP  Sodium   Date Value Ref Range Status   07/26/2022 140 136 - 145 mmol/L Final     Potassium   Date Value Ref Range Status   07/26/2022 4.0 3.5 - 5.1 mmol/L Final     Chloride   Date Value Ref Range Status   07/26/2022 106 95 - 110 mmol/L Final     CO2   Date Value Ref Range Status   07/26/2022 23 23 - 29 mmol/L Final     Glucose   Date Value Ref Range Status   07/26/2022 120 (H) 70 - 110 mg/dL Final     BUN   Date Value Ref Range Status   07/26/2022 58 (H) 8 - 23 mg/dL Final     Creatinine   Date Value Ref Range Status   07/26/2022 1.7 (H) 0.5 - 1.4 mg/dL Final     Calcium   Date Value Ref Range Status   07/26/2022 9.1 8.7 - 10.5 mg/dL Final     Anion Gap   Date Value Ref Range Status   07/26/2022 11 8 - 16 mmol/L Final     eGFR if    Date Value Ref Range Status   07/26/2022 47 (A) >60 mL/min/1.73 m^2 Final     eGFR if non    Date Value Ref Range Status   07/26/2022 41 (A) >60 mL/min/1.73 m^2 Final     Comment:     Calculation used to obtain the estimated glomerular filtration  rate (eGFR) is the CKD-EPI equation.                Imaging:  ECG was independently visualized and reviewed by me and showed sinus rhythm with LVH.    I have personally reviewed these images    Case discussed as above    Assessment:   1.  Anticoagulant use  2.  Melena  3.  Anemia - not characterized with iron studies on admission  4.  History of colon polyps    Plan:  1.  PPI  2.  NPO  3.  EGD today  4.  Will see if iron studies and ferritin can be added to pre-transfusion labs  5.  Further recommendations to follow after above.  6.  Communication will be sent to the referring provider regarding my assessment and plan on this patient via EPIC.      Naseem Haney MD  [facility]  [address], [address]  [place] [address]  Office: [phone]  Fax: [phone]

## 2022-07-27 NOTE — PLAN OF CARE
Patient progressing towards discharge.    Patient had no acute events noted. Patient received 2 units PRBC overnight.  Patient ambulatory with stand by assistance.  Patient voiding via urinal.  IVF per order after blood transfusion.  Discussed POC with patient and family with verbalization of understanding.    Will continue to monitor.

## 2022-07-27 NOTE — DISCHARGE INSTRUCTIONS
Discharge Instructions, [facility]    Follow up with GI in 8 weeks for repeat EGD. Start taking PPI twice a day. Start taking Carafate for 10 days.     Thank you for choosing [facility] for your medical care.     You were admitted to the hospital with GI bleed.     Please note your discharge instructions, including diet/activity restrictions, follow-up appointments, and medication changes.  If you have any questions about your medical issues, prescriptions, or any other questions, please feel free to contact the [facility] Medicine Dept at [phone] and we will help.    If you are previously with Home health, outpatient PT/OT or under a therapy program, you are cleared to return to those programs.    Please direct all long term medication refills and follow up to your primary care provider, Hussain Marcelo MD. Thank you again for letting us take care of your health care needs.    Please note the following discharge instructions per your discharging physician-  Hilario Dubois PA-C

## 2022-07-27 NOTE — ASSESSMENT & PLAN NOTE
Patient with Paroxysmal (<7 days) atrial fibrillation which is controlled currently with Calcium Channel Blocker. Patient is currently in sinus rhythm.[id number] Score: 2. HASBLED Score: Unable to calculate. Anticoagulation indicated. Anticoagulation done with Eliquis .    Hold Eliquis due to active bleeding

## 2022-07-27 NOTE — SUBJECTIVE & OBJECTIVE
Past Medical History:   Diagnosis Date    Diabetes mellitus     type 2    Hypertension        History reviewed. No pertinent surgical history.    Review of patient's allergies indicates:   Allergen Reactions    Pcn [penicillins] Itching and Swelling       No current facility-administered medications on file prior to encounter.     Current Outpatient Medications on File Prior to Encounter   Medication Sig    allopurinoL (ZYLOPRIM) 300 MG tablet Take 300 mg by mouth once daily.    diltiaZEM (CARDIZEM CD) 180 MG 24 hr capsule Take 180 mg by mouth once daily.    ELIQUIS 5 mg Tab Take 5 mg by mouth 2 (two) times daily.    gabapentin (NEURONTIN) 600 MG tablet TAKE 1 TABLET BY MOUTH TWICE A DAY    losartan-hydrochlorothiazide 50-12.5 mg (HYZAAR) 50-12.5 mg per tablet Take 1 tablet by mouth once daily.    meloxicam (MOBIC) 15 MG tablet TAKE 1 TABLET BY MOUTH EVERY DAY    metFORMIN (GLUCOPHAGE) 1000 MG tablet metformin 1,000 mg tablet     Family History       Family history is unknown by patient.          Tobacco Use    Smoking status: Never Smoker    Smokeless tobacco: Not on file   Substance and Sexual Activity    Alcohol use: Yes     Alcohol/week: 30.0 standard drinks     Types: 30 Cans of beer per week    Drug use: Not on file    Sexual activity: Not on file     Review of Systems   Constitutional:  Positive for fatigue. Negative for activity change, appetite change, chills and fever.   HENT:  Negative for congestion, sore throat and trouble swallowing.    Eyes:  Negative for photophobia and visual disturbance.   Respiratory:  Negative for cough, chest tightness and shortness of breath.    Cardiovascular:  Negative for chest pain, palpitations and leg swelling.   Gastrointestinal:  Positive for blood in stool. Negative for abdominal pain, diarrhea and nausea.   Genitourinary:  Negative for dysuria, flank pain and hematuria.   Musculoskeletal:  Negative for back pain.   Neurological:  Positive for light-headedness.  Negative for dizziness, weakness and headaches.   Psychiatric/Behavioral:  Negative for confusion.    Objective:     Vital Signs (Most Recent):  Temp: 97.7 °F (36.5 °C) (07/27/22 0015)  Pulse: 75 (07/27/22 0015)  Resp: 18 (07/27/22 0015)  BP: (!) 110/53 (07/27/22 0015)  SpO2: 96 % (07/26/22 2218)   Vital Signs (24h Range):  Temp:  [97.7 °F (36.5 °C)-98.5 °F (36.9 °C)] 97.7 °F (36.5 °C)  Pulse:  [71-89] 75  Resp:  [16-20] 18  SpO2:  [96 %-100 %] 96 %  BP: ()/(53-86) 110/53     Weight: 110.7 kg (244 lb)  Body mass index is 30.5 kg/m².    Physical Exam  Vitals reviewed.   Constitutional:       Appearance: Normal appearance. He is normal weight.   HENT:      Head: Normocephalic.      Mouth/Throat:      Mouth: Mucous membranes are moist.      Pharynx: Oropharynx is clear.   Eyes:      Pupils: Pupils are equal, round, and reactive to light.   Cardiovascular:      Rate and Rhythm: Normal rate and regular rhythm.      Pulses: Normal pulses.      Heart sounds: Normal heart sounds.   Pulmonary:      Effort: Pulmonary effort is normal.      Breath sounds: Normal breath sounds.   Abdominal:      General: Bowel sounds are normal.      Palpations: Abdomen is soft.   Musculoskeletal:         General: Normal range of motion.      Cervical back: Normal range of motion.   Skin:     General: Skin is warm and dry.      Coloration: Skin is pale.   Neurological:      Mental Status: He is alert and oriented to person, place, and time. Mental status is at baseline.   Psychiatric:         Mood and Affect: Mood normal.         Speech: Speech normal.         Behavior: Behavior normal.         CRANIAL NERVES     CN III, IV, VI   Pupils are equal, round, and reactive to light.     Significant Labs: All pertinent labs within the past 24 hours have been reviewed.  CBC:   Recent Labs   Lab 07/26/22  1835   WBC 11.44   HGB 6.1*   HCT 17.7*        CMP:   Recent Labs   Lab 07/26/22  1835      K 4.0      CO2 23   *    BUN 58*   CREATININE 1.7*   CALCIUM 9.1   ANIONGAP 11   EGFRNONAA 41*       Significant Imaging: I have reviewed all pertinent imaging results/findings within the past 24 hours.

## 2022-07-27 NOTE — ASSESSMENT & PLAN NOTE
Chronic, controlled.  Latest blood pressure and vitals reviewed-   Temp:  [98.2 °F (36.8 °C)-98.5 °F (36.9 °C)]   Pulse:  [72-89]   Resp:  [16-20]   BP: ()/(55-86)   SpO2:  [96 %-100 %] .   Home meds for hypertension were reviewed and noted below.   Hypertension Medications             diltiaZEM (CARDIZEM CD) 180 MG 24 hr capsule Take 180 mg by mouth once daily.    losartan-hydrochlorothiazide 50-12.5 mg (HYZAAR) 50-12.5 mg per tablet Take 1 tablet by mouth once daily.          While in the hospital, will manage blood pressure as follows; Adjust home antihypertensive regimen as follows- hold medications if hypotensive    Will utilize p.r.n. blood pressure medication only if patient's blood pressure greater than  180/110 and he develops symptoms such as worsening chest pain or shortness of breath.

## 2022-07-27 NOTE — ED NOTES
Upon transfer to room 306,  Patient is AAOx4, no cardiac or respiratory complications. No needs or questions at this time. MEL COTO

## 2022-07-27 NOTE — PLAN OF CARE
Hospital follow up scheduled with PCP. AVS updated.     In basket message sent to Dr. Suh's staff requesting for them to contact pt for scheduling needed hospital follow up apt/egd

## 2022-07-27 NOTE — TELEPHONE ENCOUNTER
----- Message from Macie Edwards RN sent at 7/27/2022  1:01 PM CDT -----  Regarding: hospital follow up  Good afternoon  This pt is discharging today. Dr. Suh wants repeat EGD in 8 weeks. Please contact pt for scheduling.     Thanks!

## 2022-07-27 NOTE — ASSESSMENT & PLAN NOTE
Patient's FSGs are controlled on current medication regimen.  Last A1c reviewed- No results found for: LABA1C, HGBA1C  Most recent fingerstick glucose reviewed- Recent Labs   Lab 07/27/22  0007   POCTGLUCOSE 117*     Current correctional scale  Medium  Maintain anti-hyperglycemic dose as follows-   Antihyperglycemics (From admission, onward)            None        Hold Oral hypoglycemics while patient is in the hospital.

## 2022-07-27 NOTE — HPI
"Willi Diaz is a 66 y.o. male with a past medical history of HTN, DM type II, and A-fib on Eliquis presenting with dizziness and dark tarry stool x 4 days. Patient states his wife passed away last week causing him to have decreased appetite and  stomach ache. Patient took Pepto-Bismol and noticed dark tarry stool that persisted after he stopped Pepto Bismol. Patient states he took Miralax to "get rid of black stool" that only worsened symptoms. He reports feeling weak, dizziness when standing and abdominal pain. ED work up revealed Hbg 6.1 and elevated Creatinine 1.7. Patient initially hypotensive upon arrival to ED and received 2L IV NS. Patient's BP improved with IVF. Patient received IV Protonix & 2 units PRBC's in ED. ED provider discussed with Dr. Blas, GI, who agreed to consult and patient referred to hospital medicine. Patient complaining of generalized abdominal pain and dizziness. Patient denies shortness of breath, chest pain, vomiting, fever and chills. Patient will be admitted to hospital medicine for further evaluation and management.             "

## 2022-07-27 NOTE — PLAN OF CARE
Pt clear for DC from case management standpoint. Discharging to home       07/27/22 1308   Final Note   Assessment Type Final Discharge Note   Anticipated Discharge Disposition Home   Hospital Resources/Appts/Education Provided Appointments scheduled and added to AVS

## 2022-07-27 NOTE — SUBJECTIVE & OBJECTIVE
Past Medical History:   Diagnosis Date    Diabetes mellitus     type 2    Hypertension        History reviewed. No pertinent surgical history.    Review of patient's allergies indicates:   Allergen Reactions    Pcn [penicillins] Itching and Swelling       No current facility-administered medications on file prior to encounter.     Current Outpatient Medications on File Prior to Encounter   Medication Sig    allopurinoL (ZYLOPRIM) 300 MG tablet Take 300 mg by mouth once daily.    diltiaZEM (CARDIZEM CD) 180 MG 24 hr capsule Take 180 mg by mouth once daily.    ELIQUIS 5 mg Tab Take 5 mg by mouth 2 (two) times daily.    gabapentin (NEURONTIN) 600 MG tablet TAKE 1 TABLET BY MOUTH TWICE A DAY    losartan-hydrochlorothiazide 50-12.5 mg (HYZAAR) 50-12.5 mg per tablet Take 1 tablet by mouth once daily.    meloxicam (MOBIC) 15 MG tablet TAKE 1 TABLET BY MOUTH EVERY DAY    metFORMIN (GLUCOPHAGE) 1000 MG tablet metformin 1,000 mg tablet     Family History       Family history is unknown by patient.          Tobacco Use    Smoking status: Never Smoker    Smokeless tobacco: Not on file   Substance and Sexual Activity    Alcohol use: Yes     Alcohol/week: 30.0 standard drinks     Types: 30 Cans of beer per week    Drug use: Not on file    Sexual activity: Not on file     Review of Systems   Constitutional:  Positive for fatigue. Negative for activity change, appetite change, chills and fever.   HENT:  Negative for congestion, sore throat and trouble swallowing.    Eyes:  Negative for photophobia and visual disturbance.   Respiratory:  Negative for cough, chest tightness and shortness of breath.    Cardiovascular:  Negative for chest pain, palpitations and leg swelling.   Gastrointestinal:  Positive for abdominal pain, blood in stool and nausea. Negative for diarrhea.   Genitourinary:  Negative for dysuria, flank pain and hematuria.   Musculoskeletal:  Negative for back pain.   Neurological:  Positive for light-headedness.  Negative for dizziness, weakness and headaches.   Psychiatric/Behavioral:  Negative for confusion.    Objective:     Vital Signs (Most Recent):  Temp: 98.2 °F (36.8 °C) (07/26/22 2218)  Pulse: 72 (07/26/22 2218)  Resp: 20 (07/26/22 2218)  BP: (!) 164/78 (07/26/22 2218)  SpO2: 96 % (07/26/22 2218)   Vital Signs (24h Range):  Temp:  [98.2 °F (36.8 °C)-98.5 °F (36.9 °C)] 98.2 °F (36.8 °C)  Pulse:  [72-89] 72  Resp:  [16-20] 20  SpO2:  [96 %-100 %] 96 %  BP: ()/(55-86) 164/78     Weight: 110.7 kg (244 lb)  Body mass index is 30.5 kg/m².    Physical Exam  Vitals reviewed.   Constitutional:       Appearance: Normal appearance. He is normal weight.   HENT:      Head: Normocephalic.      Mouth/Throat:      Mouth: Mucous membranes are moist.      Pharynx: Oropharynx is clear.   Eyes:      Pupils: Pupils are equal, round, and reactive to light.   Cardiovascular:      Rate and Rhythm: Normal rate and regular rhythm.      Pulses: Normal pulses.      Heart sounds: Normal heart sounds.   Pulmonary:      Effort: Pulmonary effort is normal.      Breath sounds: Normal breath sounds.   Abdominal:      General: Bowel sounds are normal.      Palpations: Abdomen is soft.   Musculoskeletal:         General: Normal range of motion.      Cervical back: Normal range of motion.   Skin:     General: Skin is warm and dry.      Coloration: Skin is pale.   Neurological:      Mental Status: He is alert and oriented to person, place, and time. Mental status is at baseline.   Psychiatric:         Mood and Affect: Mood normal.         Speech: Speech normal.         Behavior: Behavior normal.         CRANIAL NERVES     CN III, IV, VI   Pupils are equal, round, and reactive to light.     Significant Labs: All pertinent labs within the past 24 hours have been reviewed.  CBC:   Recent Labs   Lab 07/26/22  1835   WBC 11.44   HGB 6.1*   HCT 17.7*        CMP:   Recent Labs   Lab 07/26/22  1835      K 4.0      CO2 23   *    BUN 58*   CREATININE 1.7*   CALCIUM 9.1   ANIONGAP 11   EGFRNONAA 41*       Significant Imaging: I have reviewed all pertinent imaging results/findings within the past 24 hours.

## 2022-07-27 NOTE — TRANSFER OF CARE
"Anesthesia Transfer of Care Note    Patient: Willi Diaz    Procedure(s) Performed: Procedure(s) (LRB):  EGD (ESOPHAGOGASTRODUODENOSCOPY) (N/A)    Patient location: GI    Anesthesia Type: general    Transport from OR: Transported from OR on 2-3 L/min O2 by NC with adequate spontaneous ventilation    Post pain: adequate analgesia    Post assessment: no apparent anesthetic complications    Post vital signs: stable    Level of consciousness: awake    Nausea/Vomiting: no nausea/vomiting    Complications: none    Transfer of care protocol was followed      Last vitals:   Visit Vitals  /72 (BP Location: Right arm, Patient Position: Lying)   Pulse 67   Temp 36.6 °C (97.8 °F) (Oral)   Resp 19   Ht 6' 3" (1.905 m)   Wt 110.7 kg (244 lb)   SpO2 100%   BMI 30.50 kg/m²     "

## 2022-07-27 NOTE — ANESTHESIA PREPROCEDURE EVALUATION
07/27/2022  Willi Diaz is a 66 y.o., male.      Pre-op Assessment    I have reviewed the Patient Summary Reports.     I have reviewed the Nursing Notes. I have reviewed the NPO Status.   I have reviewed the Medications.     Review of Systems  Anesthesia Hx:  No problems with previous Anesthesia    Social:  Non-Smoker    Hematology/Oncology:         -- Anemia:   Cardiovascular:   Hypertension, well controlled Dysrhythmias atrial fibrillation    Pulmonary:  Pulmonary Normal    Renal/:  Renal/ Normal     Neurological:  Neurology Normal    Endocrine:   Diabetes, well controlled, type 2        Physical Exam  General: Well nourished, Cooperative, Alert and Oriented    Airway:  Mallampati: I   Mouth Opening: Normal  Neck ROM: Normal ROM        Anesthesia Plan  Type of Anesthesia, risks & benefits discussed:    Anesthesia Type: Gen ETT, Gen Supraglottic Airway, Gen Natural Airway, MAC  Intra-op Monitoring Plan: Standard ASA Monitors  Post Op Pain Control Plan: multimodal analgesia  Induction:  IV  Airway Plan: Direct, Video and Fiberoptic, Post-Induction  Informed Consent: Informed consent signed with the Patient and all parties understand the risks and agree with anesthesia plan.  All questions answered.   ASA Score: 3    Ready For Surgery From Anesthesia Perspective.     .

## 2022-07-29 ENCOUNTER — PATIENT MESSAGE (OUTPATIENT)
Dept: GASTROENTEROLOGY | Facility: CLINIC | Age: 67
End: 2022-07-29
Payer: MEDICARE

## 2022-08-01 ENCOUNTER — OFFICE VISIT (OUTPATIENT)
Dept: URGENT CARE | Facility: CLINIC | Age: 67
End: 2022-08-01
Payer: MEDICARE

## 2022-08-01 VITALS
DIASTOLIC BLOOD PRESSURE: 73 MMHG | RESPIRATION RATE: 18 BRPM | SYSTOLIC BLOOD PRESSURE: 144 MMHG | WEIGHT: 242.38 LBS | HEART RATE: 70 BPM | TEMPERATURE: 99 F | BODY MASS INDEX: 30.14 KG/M2 | OXYGEN SATURATION: 100 % | HEIGHT: 75 IN

## 2022-08-01 DIAGNOSIS — M10.9 ACUTE GOUT OF RIGHT WRIST, UNSPECIFIED CAUSE: ICD-10-CM

## 2022-08-01 DIAGNOSIS — M25.531 RIGHT WRIST PAIN: Primary | ICD-10-CM

## 2022-08-01 PROCEDURE — 3078F DIAST BP <80 MM HG: CPT | Mod: CPTII,S$GLB,,

## 2022-08-01 PROCEDURE — 3008F BODY MASS INDEX DOCD: CPT | Mod: CPTII,S$GLB,,

## 2022-08-01 PROCEDURE — 96372 THER/PROPH/DIAG INJ SC/IM: CPT | Mod: S$GLB,,,

## 2022-08-01 PROCEDURE — 96372 PR INJECTION,THERAP/PROPH/DIAG2ST, IM OR SUBCUT: ICD-10-PCS | Mod: S$GLB,,,

## 2022-08-01 PROCEDURE — 1160F PR REVIEW ALL MEDS BY PRESCRIBER/CLIN PHARMACIST DOCUMENTED: ICD-10-PCS | Mod: CPTII,S$GLB,,

## 2022-08-01 PROCEDURE — 1160F RVW MEDS BY RX/DR IN RCRD: CPT | Mod: CPTII,S$GLB,,

## 2022-08-01 PROCEDURE — 3078F PR MOST RECENT DIASTOLIC BLOOD PRESSURE < 80 MM HG: ICD-10-PCS | Mod: CPTII,S$GLB,,

## 2022-08-01 PROCEDURE — 3077F PR MOST RECENT SYSTOLIC BLOOD PRESSURE >= 140 MM HG: ICD-10-PCS | Mod: CPTII,S$GLB,,

## 2022-08-01 PROCEDURE — 3008F PR BODY MASS INDEX (BMI) DOCUMENTED: ICD-10-PCS | Mod: CPTII,S$GLB,,

## 2022-08-01 PROCEDURE — 1159F MED LIST DOCD IN RCRD: CPT | Mod: CPTII,S$GLB,,

## 2022-08-01 PROCEDURE — 1159F PR MEDICATION LIST DOCUMENTED IN MEDICAL RECORD: ICD-10-PCS | Mod: CPTII,S$GLB,,

## 2022-08-01 PROCEDURE — 3044F HG A1C LEVEL LT 7.0%: CPT | Mod: CPTII,S$GLB,,

## 2022-08-01 PROCEDURE — 99213 PR OFFICE/OUTPT VISIT, EST, LEVL III, 20-29 MIN: ICD-10-PCS | Mod: 25,S$GLB,,

## 2022-08-01 PROCEDURE — 3077F SYST BP >= 140 MM HG: CPT | Mod: CPTII,S$GLB,,

## 2022-08-01 PROCEDURE — 99213 OFFICE O/P EST LOW 20 MIN: CPT | Mod: 25,S$GLB,,

## 2022-08-01 PROCEDURE — 3044F PR MOST RECENT HEMOGLOBIN A1C LEVEL <7.0%: ICD-10-PCS | Mod: CPTII,S$GLB,,

## 2022-08-01 RX ORDER — DEXAMETHASONE SODIUM PHOSPHATE 4 MG/ML
8 INJECTION, SOLUTION INTRA-ARTICULAR; INTRALESIONAL; INTRAMUSCULAR; INTRAVENOUS; SOFT TISSUE
Status: COMPLETED | OUTPATIENT
Start: 2022-08-01 | End: 2022-08-01

## 2022-08-01 RX ORDER — PREDNISONE 20 MG/1
20 TABLET ORAL DAILY
Qty: 3 TABLET | Refills: 0 | Status: SHIPPED | OUTPATIENT
Start: 2022-08-01 | End: 2022-08-04

## 2022-08-01 RX ORDER — PREDNISONE 20 MG/1
20 TABLET ORAL 2 TIMES DAILY
Qty: 8 TABLET | Refills: 0 | Status: SHIPPED | OUTPATIENT
Start: 2022-08-01 | End: 2022-08-01

## 2022-08-01 RX ADMIN — DEXAMETHASONE SODIUM PHOSPHATE 8 MG: 4 INJECTION, SOLUTION INTRA-ARTICULAR; INTRALESIONAL; INTRAMUSCULAR; INTRAVENOUS; SOFT TISSUE at 09:08

## 2022-08-01 NOTE — PROGRESS NOTES
"Subjective:       Patient ID: Willi Diaz is a 66 y.o. male.    Vitals:  height is 6' 3" (1.905 m) and weight is 110 kg (242 lb 6.4 oz). His oral temperature is 98.6 °F (37 °C). His blood pressure is 144/73 (abnormal) and his pulse is 70. His respiration is 18 and oxygen saturation is 100%.     Chief Complaint: Wrist Pain    Pt states he took no medications today. Pt states he has stomach ulcers recently and cannot take nsaid's.    Wrist Pain   The pain is present in the right wrist. This is a new problem. The current episode started in the past 7 days. There has been no history of extremity trauma. The problem has been gradually worsening. The pain is at a severity of 10/10. Associated symptoms include a limited range of motion. Pertinent negatives include no fever. Associated symptoms comments: Swollen, throbbing, inability to grasp.. He has tried cold (biofreeze) for the symptoms. His past medical history is significant for diabetes and gout.       Constitution: Negative for activity change, appetite change, chills, sweating, fever and unexpected weight change.   HENT: Negative for ear pain, postnasal drip, sinus pain, sinus pressure and sore throat.    Cardiovascular: Negative for chest pain.   Eyes: Negative for blurred vision.   Respiratory: Negative for chest tightness, cough and shortness of breath.    Gastrointestinal: Negative for abdominal pain, nausea, vomiting, constipation, diarrhea, bright red blood in stool, dark colored stools, rectal bleeding and rectal pain.   Musculoskeletal: Positive for abnormal ROM of joint and gout.   Neurological: Negative for dizziness, history of vertigo and altered mental status.   Psychiatric/Behavioral: Negative for altered mental status.       Objective:      Physical Exam   Constitutional: He is oriented to person, place, and time.  Non-toxic appearance. He does not appear ill. No distress.   HENT:   Head: Normocephalic.   Eyes: Conjunctivae are normal. " Extraocular movement intact   Cardiovascular: Normal rate, normal heart sounds and normal pulses.   Pulmonary/Chest: Effort normal and breath sounds normal. No respiratory distress.   Abdominal: Soft. There is no abdominal tenderness. There is no guarding.   Musculoskeletal:      Right wrist: He exhibits tenderness, bony tenderness (Distal radius) and swelling (distal radius). He exhibits normal range of motion.   Neurological: no focal deficit. He is alert and oriented to person, place, and time.   Skin: Skin is not diaphoretic. Capillary refill takes 2 to 3 seconds.   Psychiatric: His behavior is normal. Mood normal.         Assessment:       1. Right wrist pain    2. Acute gout of right wrist, unspecified cause          Plan:         Right wrist pain  -     Discontinue: predniSONE (DELTASONE) 20 MG tablet; Take 1 tablet (20 mg total) by mouth 2 (two) times daily. for 4 days  Dispense: 8 tablet; Refill: 0    Acute gout of right wrist, unspecified cause  -     Discontinue: predniSONE (DELTASONE) 20 MG tablet; Take 1 tablet (20 mg total) by mouth 2 (two) times daily. for 4 days  Dispense: 8 tablet; Refill: 0  -     dexamethasone injection 8 mg  -     predniSONE (DELTASONE) 20 MG tablet; Take 1 tablet (20 mg total) by mouth once daily. for 3 days  Dispense: 3 tablet; Refill: 0         Patient presents with right wrist pain and swelling, denies injury,fall or trauma. Patient has a history of gout and reports symptoms are consistent with previous gout flares. Patient was recently discharged from the hospital for a GI bleed, will avoid NSAID's. Patient has appointment with his pcp tomorrow. Patient requesting steroid shot. Steroid shot given in clinic and light dose of prednisone ordered for the next few days. Patient instructed to monitor for any blood in stool, light headiness, abdominal pain and go to the ER if this occurs.

## 2022-08-01 NOTE — PATIENT INSTRUCTIONS
Follow up with your scheduled appointment tomorrow with your PCP as directed.     Start oral steroids tomorrow.    If you develop any increase stomach pain, bloody stool or worsening of symptoms discontinue oral steroids and call the clinic.     Continue your Protonix as directed.

## 2022-08-04 NOTE — DISCHARGE SUMMARY
"[facility] Medicine  Discharge Summary      Patient Name: Willi Diaz  MRN: [id number]  Patient Class: IP- Inpatient  Admission Date: 7/26/2022  Hospital Length of Stay: 1 days  Discharge Date and Time: 7/27/2022  3:06 PM  Attending Physician: Christine Mccoy M.D.  Discharging Provider: Hilario Dubois PA-C  Primary Care Provider: Hussain Marcelo MD      HPI:   Willi Diaz is a 66 y.o. male with a past medical history of HTN, DM type II, and A-fib on Eliquis presenting with dizziness and dark tarry stool x 4 days. Patient states his wife passed away last week causing him to have decreased appetite and  stomach ache. Patient took Pepto-Bismol and noticed dark tarry stool that persisted after he stopped Pepto Bismol. Patient states he took Miralax to "get rid of black stool" that only worsened symptoms. He reports feeling weak, dizziness when standing and abdominal pain. ED work up revealed Hbg 6.1 and elevated Creatinine 1.7. Patient initially hypotensive upon arrival to ED and received 2L IV NS. Patient's BP improved with IVF. Patient received IV Protonix & 2 units PRBC's in ED. ED provider discussed with Dr. Blas, GI, who agreed to consult and patient referred to hospital medicine. Patient complaining of generalized abdominal pain and dizziness. Patient denies shortness of breath, chest pain, vomiting, fever and chills. Patient will be admitted to hospital medicine for further evaluation and management.                 Procedure(s) (LRB):  EGD (ESOPHAGOGASTRODUODENOSCOPY) (N/A)      Hospital Course:   Willi Diaz is a 66 y.o. male with a PMHx significant for HTN, DM type II, and A fib on eliquis presenting for dizziness and dark tarry stools. Patient was monitored closely throughout course of hospital stay by hospital medicine team. Patient received IV protonix and 2 units of PRBCs on admission. GI consulted on admission and recommended EGD. EGD did not demonstrate any active " bleeding. Clean based duodenal ulcer noted on EGD. Patient cleared for discharge by GI and resume Eliquis.  Patient started on daily PPI BID and sucralfate x 10 days per GI. Patient Cr trended throughout course of stay and decreased following IV fluids. Patient to follow up with GI in 8 weeks for repeat EGD. Patient to be discharge home with family. Patient verbalized understanding of discharge instructions and return precautions.     Physical Exam:  General- Patient alert and oriented x3 in NAD  HEENT- PERRLA, EOMI, OP clear, MMM  CV- Regular rate and rhythm, No Murmur/hortensia/rubs  Resp- Lungs CTA Bilaterally, No increased WOB  GI- Non tender/non-distended, BS normoactive x4 quads, no HSM  Extrem- No cyanosis, clubbing, edema. Pulses 2+ and symmetric  Neuro- Strength 5/5 flexors/extensors, DTRs 2+ and symmetric, Intact sensation to light touch grossly           Goals of Care Treatment Preferences:  Code Status: Full Code      Consults:   Consults (From admission, onward)        Status Ordering Provider     IP consult to case management  Once        Provider:  (Not yet assigned)    Completed PERLA SANTAMARIA     Inpatient consult to Gastroenterology  Once        Provider:  Tatiana Connor MD    Completed JULIA SMART          Acute blood loss anemia  Patient's anemia is currently uncontrolled. Has recieved 2 units of PRBCs on 7/27/2022. Etiology likely d/t GI bleed  Current CBC reviewed-   Lab Results   Component Value Date    HGB 6.1 (L) 07/26/2022    HCT 17.7 (LL) 07/26/2022     Monitor serial CBC and transfuse if patient becomes hemodynamically unstable, symptomatic or H/H drops below 7/21.         Hypertension associated with diabetes  Chronic, controlled.  Latest blood pressure and vitals reviewed-   Temp:  [98.2 °F (36.8 °C)-98.5 °F (36.9 °C)]   Pulse:  [72-89]   Resp:  [16-20]   BP: ()/(55-86)   SpO2:  [96 %-100 %] .   Home meds for hypertension were reviewed and noted below.   Hypertension  Medications             diltiaZEM (CARDIZEM CD) 180 MG 24 hr capsule Take 180 mg by mouth once daily.    losartan-hydrochlorothiazide 50-12.5 mg (HYZAAR) 50-12.5 mg per tablet Take 1 tablet by mouth once daily.          While in the hospital, will manage blood pressure as follows; Adjust home antihypertensive regimen as follows- hold medications if hypotensive    Will utilize p.r.n. blood pressure medication only if patient's blood pressure greater than  180/110 and he develops symptoms such as worsening chest pain or shortness of breath.      Type 2 diabetes mellitus  Patient's FSGs are controlled on current medication regimen.  Last A1c reviewed- No results found for: LABA1C, HGBA1C  Most recent fingerstick glucose reviewed- Recent Labs   Lab 07/27/22  0007   POCTGLUCOSE 117*     Current correctional scale  Medium  Maintain anti-hyperglycemic dose as follows-   Antihyperglycemics (From admission, onward)            None        Hold Oral hypoglycemics while patient is in the hospital.    Atrial fibrillation  Patient with Paroxysmal (<7 days) atrial fibrillation which is controlled currently with Calcium Channel Blocker. Patient is currently in sinus rhythm.[id number] Score: 2. HASBLED Score: Unable to calculate. Anticoagulation indicated. Anticoagulation done with Eliquis .    Hold Eliquis due to active bleeding          Final Active Diagnoses:    Diagnosis Date Noted POA    PRINCIPAL PROBLEM:  GI bleed [K92.2] 07/26/2022 Yes    Acute blood loss anemia [D62] 07/27/2022 Yes    Hypertension associated with diabetes [E11.59, I15.2] 07/26/2022 Yes    Atrial fibrillation [I48.91] 07/26/2022 Yes    Type 2 diabetes mellitus [E11.9] 05/20/2021 Yes      Problems Resolved During this Admission:       Discharged Condition: good    Disposition: Home or Self Care    Follow Up:   Follow-up Information     Naseem Suh MD Follow up in 2 month(s).    Specialty: Gastroenterology  Why: office will contact you for scheduling  hospital follow up  Contact information:  [address] [facility]  [address]  [place] [address]  [phone]             Hussain Marcelo MD Follow up on 8/2/2022.    Specialty: Family Medicine  Why: 10 AM for hospital follow up  Contact information:  [address] [place] [address]  [phone]                       Patient Instructions:      Diet diabetic     Notify your health care provider if you experience any of the following:  temperature >100.4     Notify your health care provider if you experience any of the following:  persistent nausea and vomiting or diarrhea     Notify your health care provider if you experience any of the following:  severe uncontrolled pain     Notify your health care provider if you experience any of the following:  redness, tenderness, or signs of infection (pain, swelling, redness, odor or green/yellow discharge around incision site)     Notify your health care provider if you experience any of the following:  difficulty breathing or increased cough     Notify your health care provider if you experience any of the following:  increased confusion or weakness     Activity as tolerated       Significant Diagnostic Studies: Labs: CMP No results for input(s): NA, K, CL, CO2, GLU, BUN, CREATININE, CALCIUM, PROT, ALBUMIN, BILITOT, ALKPHOS, AST, ALT, ANIONGAP, ESTGFRAFRICA, EGFRNONAA in the last 48 hours. and CBC No results for input(s): WBC, HGB, HCT, PLT in the last 48 hours.    Pending Diagnostic Studies:     None         Medications:  Reconciled Home Medications:      Medication List      START taking these medications    pantoprazole 40 MG tablet  Commonly known as: PROTONIX  Take 1 tablet (40 mg total) by mouth 2 (two) times daily.     sucralfate 100 mg/mL suspension  Commonly known as: CARAFATE  Take 10 mLs (1 g total) by mouth every 6 (six) hours. for 10 days        CONTINUE taking these medications    allopurinoL 300 MG tablet  Commonly known as: ZYLOPRIM  Take 300 mg by mouth once  daily.     diltiaZEM 180 MG 24 hr capsule  Commonly known as: CARDIZEM CD  Take 180 mg by mouth once daily.     ELIQUIS 5 mg Tab  Generic drug: apixaban  Take 5 mg by mouth 2 (two) times daily.     gabapentin 600 MG tablet  Commonly known as: NEURONTIN  TAKE 1 TABLET BY MOUTH TWICE A DAY     losartan-hydrochlorothiazide 50-12.5 mg 50-12.5 mg per tablet  Commonly known as: HYZAAR  Take 1 tablet by mouth once daily.     metFORMIN 1000 MG tablet  Commonly known as: GLUCOPHAGE  metformin 1,000 mg tablet        STOP taking these medications    cetirizine 10 MG tablet  Commonly known as: ZYRTEC     fluticasone propionate 50 mcg/actuation nasal spray  Commonly known as: FLONASE     HYDROcodone-acetaminophen 5-325 mg per tablet  Commonly known as: NORCO     ibuprofen 600 MG tablet  Commonly known as: ADVIL,MOTRIN     meloxicam 15 MG tablet  Commonly known as: MOBIC            Indwelling Lines/Drains at time of discharge:   Lines/Drains/Airways     None                 Time spent on the discharge of patient: 40 minutes         Hilario Dubois PA-C  Department of Hospital Medicine  [facility]

## 2022-08-04 NOTE — HOSPITAL COURSE
Willi Diaz is a 66 y.o. male with a PMHx significant for HTN, DM type II, and A fib on eliquis presenting for dizziness and dark tarry stools. Patient was monitored closely throughout course of hospital stay by hospital medicine team. Patient received IV protonix and 2 units of PRBCs on admission. GI consulted on admission and recommended EGD. EGD did not demonstrate any active bleeding. Clean based duodenal ulcer noted on EGD. Patient cleared for discharge by GI and resume Eliquis.  Patient started on daily PPI BID and sucralfate x 10 days per GI. Patient Cr trended throughout course of stay and decreased following IV fluids. Patient to follow up with GI in 8 weeks for repeat EGD. Patient to be discharge home with family. Patient verbalized understanding of discharge instructions and return precautions.     Physical Exam:  General- Patient alert and oriented x3 in NAD  HEENT- PERRLA, EOMI, OP clear, MMM  CV- Regular rate and rhythm, No Murmur/hortensia/rubs  Resp- Lungs CTA Bilaterally, No increased WOB  GI- Non tender/non-distended, BS normoactive x4 quads, no HSM  Extrem- No cyanosis, clubbing, edema. Pulses 2+ and symmetric  Neuro- Strength 5/5 flexors/extensors, DTRs 2+ and symmetric, Intact sensation to light touch grossly

## 2022-08-09 DIAGNOSIS — M25.561 PAIN IN BOTH KNEES, UNSPECIFIED CHRONICITY: Primary | ICD-10-CM

## 2022-08-09 DIAGNOSIS — M25.562 PAIN IN BOTH KNEES, UNSPECIFIED CHRONICITY: Primary | ICD-10-CM

## 2022-08-15 ENCOUNTER — OFFICE VISIT (OUTPATIENT)
Dept: ORTHOPEDICS | Facility: CLINIC | Age: 67
End: 2022-08-15
Payer: MEDICARE

## 2022-08-15 ENCOUNTER — HOSPITAL ENCOUNTER (OUTPATIENT)
Dept: RADIOLOGY | Facility: HOSPITAL | Age: 67
Discharge: HOME | End: 2022-08-15
Attending: ORTHOPAEDIC SURGERY
Payer: MEDICARE

## 2022-08-15 VITALS — BODY MASS INDEX: 30.15 KG/M2 | HEIGHT: 75 IN | WEIGHT: 242.5 LBS

## 2022-08-15 DIAGNOSIS — M25.562 PAIN IN BOTH KNEES, UNSPECIFIED CHRONICITY: ICD-10-CM

## 2022-08-15 DIAGNOSIS — M17.0 BILATERAL PRIMARY OSTEOARTHRITIS OF KNEE: Primary | ICD-10-CM

## 2022-08-15 DIAGNOSIS — M25.561 PAIN IN BOTH KNEES, UNSPECIFIED CHRONICITY: ICD-10-CM

## 2022-08-15 PROCEDURE — 99999 PR PBB SHADOW E&M-EST. PATIENT-LVL III: CPT | Mod: PBBFAC,,, | Performed by: ORTHOPAEDIC SURGERY

## 2022-08-15 PROCEDURE — 1101F PR PT FALLS ASSESS DOC 0-1 FALLS W/OUT INJ PAST YR: ICD-10-PCS | Mod: CPTII,S$GLB,, | Performed by: ORTHOPAEDIC SURGERY

## 2022-08-15 PROCEDURE — 1160F RVW MEDS BY RX/DR IN RCRD: CPT | Mod: CPTII,S$GLB,, | Performed by: ORTHOPAEDIC SURGERY

## 2022-08-15 PROCEDURE — 1111F PR DISCHARGE MEDS RECONCILED W/ CURRENT OUTPATIENT MED LIST: ICD-10-PCS | Mod: CPTII,S$GLB,, | Performed by: ORTHOPAEDIC SURGERY

## 2022-08-15 PROCEDURE — 1159F PR MEDICATION LIST DOCUMENTED IN MEDICAL RECORD: ICD-10-PCS | Mod: CPTII,S$GLB,, | Performed by: ORTHOPAEDIC SURGERY

## 2022-08-15 PROCEDURE — 3288F PR FALLS RISK ASSESSMENT DOCUMENTED: ICD-10-PCS | Mod: CPTII,S$GLB,, | Performed by: ORTHOPAEDIC SURGERY

## 2022-08-15 PROCEDURE — 73564 X-RAY EXAM KNEE 4 OR MORE: CPT | Mod: TC,50,PN

## 2022-08-15 PROCEDURE — 1125F AMNT PAIN NOTED PAIN PRSNT: CPT | Mod: CPTII,S$GLB,, | Performed by: ORTHOPAEDIC SURGERY

## 2022-08-15 PROCEDURE — 1160F PR REVIEW ALL MEDS BY PRESCRIBER/CLIN PHARMACIST DOCUMENTED: ICD-10-PCS | Mod: CPTII,S$GLB,, | Performed by: ORTHOPAEDIC SURGERY

## 2022-08-15 PROCEDURE — 99214 PR OFFICE/OUTPT VISIT, EST, LEVL IV, 30-39 MIN: ICD-10-PCS | Mod: 25,S$GLB,, | Performed by: ORTHOPAEDIC SURGERY

## 2022-08-15 PROCEDURE — 99214 OFFICE O/P EST MOD 30 MIN: CPT | Mod: 25,S$GLB,, | Performed by: ORTHOPAEDIC SURGERY

## 2022-08-15 PROCEDURE — 1125F PR PAIN SEVERITY QUANTIFIED, PAIN PRESENT: ICD-10-PCS | Mod: CPTII,S$GLB,, | Performed by: ORTHOPAEDIC SURGERY

## 2022-08-15 PROCEDURE — 1159F MED LIST DOCD IN RCRD: CPT | Mod: CPTII,S$GLB,, | Performed by: ORTHOPAEDIC SURGERY

## 2022-08-15 PROCEDURE — 3044F HG A1C LEVEL LT 7.0%: CPT | Mod: CPTII,S$GLB,, | Performed by: ORTHOPAEDIC SURGERY

## 2022-08-15 PROCEDURE — 1111F DSCHRG MED/CURRENT MED MERGE: CPT | Mod: CPTII,S$GLB,, | Performed by: ORTHOPAEDIC SURGERY

## 2022-08-15 PROCEDURE — 1101F PT FALLS ASSESS-DOCD LE1/YR: CPT | Mod: CPTII,S$GLB,, | Performed by: ORTHOPAEDIC SURGERY

## 2022-08-15 PROCEDURE — 3008F BODY MASS INDEX DOCD: CPT | Mod: CPTII,S$GLB,, | Performed by: ORTHOPAEDIC SURGERY

## 2022-08-15 PROCEDURE — 3008F PR BODY MASS INDEX (BMI) DOCUMENTED: ICD-10-PCS | Mod: CPTII,S$GLB,, | Performed by: ORTHOPAEDIC SURGERY

## 2022-08-15 PROCEDURE — 73564 X-RAY EXAM KNEE 4 OR MORE: CPT | Mod: 26,50,, | Performed by: RADIOLOGY

## 2022-08-15 PROCEDURE — 73564 XR KNEE ORTHO BILAT WITH FLEXION: ICD-10-PCS | Mod: 26,50,, | Performed by: RADIOLOGY

## 2022-08-15 PROCEDURE — 99999 PR PBB SHADOW E&M-EST. PATIENT-LVL III: ICD-10-PCS | Mod: PBBFAC,,, | Performed by: ORTHOPAEDIC SURGERY

## 2022-08-15 PROCEDURE — 20610 LARGE JOINT ASPIRATION/INJECTION: BILATERAL KNEE: ICD-10-PCS | Mod: 50,S$GLB,, | Performed by: ORTHOPAEDIC SURGERY

## 2022-08-15 PROCEDURE — 3288F FALL RISK ASSESSMENT DOCD: CPT | Mod: CPTII,S$GLB,, | Performed by: ORTHOPAEDIC SURGERY

## 2022-08-15 PROCEDURE — 3044F PR MOST RECENT HEMOGLOBIN A1C LEVEL <7.0%: ICD-10-PCS | Mod: CPTII,S$GLB,, | Performed by: ORTHOPAEDIC SURGERY

## 2022-08-15 PROCEDURE — 20610 DRAIN/INJ JOINT/BURSA W/O US: CPT | Mod: 50,S$GLB,, | Performed by: ORTHOPAEDIC SURGERY

## 2022-08-15 RX ORDER — BACLOFEN 10 MG/1
TABLET ORAL
COMMUNITY

## 2022-08-15 RX ORDER — METHYLPREDNISOLONE ACETATE 80 MG/ML
80 INJECTION, SUSPENSION INTRA-ARTICULAR; INTRALESIONAL; INTRAMUSCULAR; SOFT TISSUE
Status: DISCONTINUED | OUTPATIENT
Start: 2022-08-15 | End: 2022-08-15 | Stop reason: HOSPADM

## 2022-08-15 RX ORDER — PROMETHAZINE HYDROCHLORIDE AND DEXTROMETHORPHAN HYDROBROMIDE 6.25; 15 MG/5ML; MG/5ML
SYRUP ORAL
COMMUNITY

## 2022-08-15 RX ORDER — BENZONATATE 100 MG/1
CAPSULE ORAL
COMMUNITY

## 2022-08-15 RX ORDER — MULTIVITAMIN/IRON/FOLIC ACID 18MG-0.4MG
TABLET ORAL
COMMUNITY

## 2022-08-15 RX ORDER — NAPROXEN SODIUM 220 MG/1
TABLET ORAL
COMMUNITY
End: 2024-01-11

## 2022-08-15 RX ORDER — CETIRIZINE HYDROCHLORIDE 10 MG/1
TABLET ORAL
COMMUNITY

## 2022-08-15 RX ORDER — LOVASTATIN 20 MG/1
TABLET ORAL
COMMUNITY

## 2022-08-15 RX ORDER — MELOXICAM 7.5 MG/1
TABLET ORAL
COMMUNITY

## 2022-08-15 RX ORDER — PREDNISONE 20 MG/1
20 TABLET ORAL DAILY
COMMUNITY
Start: 2022-08-04 | End: 2024-01-11

## 2022-08-15 RX ORDER — OMEPRAZOLE 20 MG/1
CAPSULE, DELAYED RELEASE ORAL
COMMUNITY

## 2022-08-15 RX ADMIN — METHYLPREDNISOLONE ACETATE 80 MG: 80 INJECTION, SUSPENSION INTRA-ARTICULAR; INTRALESIONAL; INTRAMUSCULAR; SOFT TISSUE at 03:08

## 2022-08-15 NOTE — PROGRESS NOTES
CC:  66-year-old male follows up with osteoarthritis of both of his knees.  We last saw him about a year ago on 07/29/2021.  He received injections in both of his knees and has done really well with that up until the last few weeks when he has had an insidious return of pain in both right and left knee.  He currently rates his pain as an 8/10.    Past Medical History:   Diagnosis Date    Diabetes mellitus     type 2    Hypertension        Past Surgical History:   Procedure Laterality Date    ESOPHAGOGASTRODUODENOSCOPY N/A 7/27/2022    Procedure: EGD (ESOPHAGOGASTRODUODENOSCOPY);  Surgeon: Naseem Suh MD;  [facility]: [facility];  Service: Endoscopy;  Laterality: N/A;       Current Outpatient Medications on File Prior to Visit   Medication Sig Dispense Refill    allopurinoL (ZYLOPRIM) 300 MG tablet Take 300 mg by mouth once daily.      baclofen (LIORESAL) 10 MG tablet baclofen 10 mg tablet   TAKE 1 TABLET 3 TIMES A DAY BY ORAL ROUTE AS NEEDED.      benzonatate (TESSALON) 100 MG capsule benzonatate 100 mg capsule      cetirizine (ZYRTEC) 10 MG tablet cetirizine 10 mg tablet   TAKE 1 TABLET BY MOUTH EVERY DAY      diltiaZEM (CARDIZEM CD) 180 MG 24 hr capsule Take 180 mg by mouth once daily.      ELIQUIS 5 mg Tab Take 5 mg by mouth 2 (two) times daily.      gabapentin (NEURONTIN) 600 MG tablet TAKE 1 TABLET BY MOUTH TWICE A DAY      losartan-hydrochlorothiazide 50-12.5 mg (HYZAAR) 50-12.5 mg per tablet Take 1 tablet by mouth once daily.      lovastatin (MEVACOR) 20 MG tablet 1 tablet with the evening meal      meloxicam (MOBIC) 7.5 MG tablet meloxicam 7.5 mg tablet      metFORMIN (GLUCOPHAGE) 1000 MG tablet metformin 1,000 mg tablet      molnupiravir 200 mg capsule (EUA) molnupiravir 200 mg capsule (EUA)   TAKE 4 CAPSULES BY MOUTH EVERY 12 HOURS FOR 5 DAYS      multivitamin-iron-folic acid (CENTRUM COMPLETE) Tab 1 tablet      omega 3-dha-epa-fish oil 1,200 (144-216) mg Cap 1 capsule      omeprazole  (PRILOSEC) 20 MG capsule omeprazole 20 mg capsule,delayed release      pantoprazole (PROTONIX) 40 MG tablet Take 1 tablet (40 mg total) by mouth 2 (two) times daily. 60 tablet 11    predniSONE (DELTASONE) 20 MG tablet Take 20 mg by mouth once daily.      promethazine-dextromethorphan (PROMETHAZINE-DM) 6.25-15 mg/5 mL Syrp promethazine-DM 6.25 mg-15 mg/5 mL oral syrup       No current facility-administered medications on file prior to visit.       ROS:    Constitution: Denies chills, fever, and sweats.  HENT: Denies headaches or blurry vision.  Cardiovascular: Denies chest pain or irregular heart beat.  Respiratory: Denies cough or shortness of breath.  Gastrointestinal: Denies abdominal pain, nausea, or vomiting.  Genitourinary:  Denies urinary incontinence, bladder and kidney issues  Musculoskeletal:  Denies muscle cramps.  Neurological: Denies dizziness or focal weakness.  Psychiatric/Behavioral: Normal mental status.  Hematologic/Lymphatic: Denies bleeding problem or easy bruising/bleeding.  Skin: Denies rash or suspicious lesions.    Physical examination     Gen - No acute distress, well nourished, well groomed   Eyes - Extraoccular motions intact, pupils equally round and reactive to light and accommodation   ENT - normocephalic, atruamtic, oropharynx clear   Neck - Supple, no abnormal masses   Cardiovascular - regular rate and rhythm   Pulmonary - clear to auscultation bilaterally, no wheezes, ronchi, or rales   Abdomen - soft, non-tender, non-distended, positive bowel sounds   Psych - The patient is alert and oriented x3 with normal mood and affect    Examination of the Right Lower Extremity:     Skin intact throughout.  Motor function is intact distally EHL/FHL/TA/tisha   +2 dorsalis pedis and posterior tibial pulses   Sensation to light touch intact distally dorsal, plantar, and first web space     Examination of the Right knee:    ROM 0 - 150   Effusion positive  Tenderness to palpation at the joint line  positive  Pain during range of motion positive  Crepitation during range of motion positive     positive increased pain noted with flexion past 90   positive antalgic gait noted   negative Lachman's Test   negative Anterior Drawer Test   negative Posterior Drawer Test   positive McMurrays Test   positive Disco Test   negative Varus/Valgus instability    Examination of the Left Lower Extremity:     Skin intact throughout.  Motor function is intact distally EHL/FHL/TA/tisha   +2 dorsalis pedis and posterior tibial pulses   Sensation to light touch intact distally dorsal, plantar, and first web space     Examination of the Left knee:    ROM 0 - 150   Effusion positive  Tenderness to palpation at the joint line positive  Pain during range of motion positive  Crepitation during range of motion positive     positive increased pain noted with flexion past 90   positive antalgic gait noted   negative Lachman's Test   negative Anterior Drawer Test   negative Posterior Drawer Test   positive McMurrays Test   positive Disco Test   negative Varus/Valgus instability    X-ray images were examined and personally interpreted by me.  Three views of bilateral knees dated 08/15/2022 show osteoarthritis of both right and left knee with loss of joint space, periarticular osteophytes, and subchondral sclerosis.    Dx:  Bilateral knee osteoarthritis    Plan:  Recommendations for an intra-articular injection.  The patient agreed we injected both the right and left knee with a mixture of 2, 2, 1. He tolerated that well.  Follow up p.r.n..

## 2022-08-15 NOTE — PROCEDURES
Large Joint Aspiration/Injection: bilateral knee    Date/Time: 8/15/2022 3:15 PM  Performed by: Lev Charles II, MD  Authorized by: Lev Charles II, MD     Consent Done?:  Yes (Verbal)  Indications:  Pain and arthritis  Timeout: prior to procedure the correct patient, procedure, and site was verified    Prep: patient was prepped and draped in usual sterile fashion      Local anesthesia used?: Yes    Local anesthetic:  Topical anesthetic    Details:  Needle Size:  22 G  Approach:  Anteromedial  Location:  Knee  Laterality:  Bilateral  Site:  Bilateral knee  Medications (Right):  80 mg methylPREDNISolone acetate 80 mg/mL  Medications (Left):  80 mg methylPREDNISolone acetate 80 mg/mL  Patient tolerance:  Patient tolerated the procedure well with no immediate complications

## 2022-08-26 DIAGNOSIS — K92.2 GASTROINTESTINAL HEMORRHAGE, UNSPECIFIED GASTROINTESTINAL HEMORRHAGE TYPE: Primary | ICD-10-CM

## 2022-08-26 DIAGNOSIS — K92.1 MELENA: ICD-10-CM

## 2022-09-21 ENCOUNTER — ANESTHESIA EVENT (OUTPATIENT)
Dept: ENDOSCOPY | Facility: HOSPITAL | Age: 67
End: 2022-09-21
Payer: MEDICARE

## 2022-09-21 ENCOUNTER — ANESTHESIA (OUTPATIENT)
Dept: ENDOSCOPY | Facility: HOSPITAL | Age: 67
End: 2022-09-21
Payer: MEDICARE

## 2022-09-21 ENCOUNTER — HOSPITAL ENCOUNTER (OUTPATIENT)
Facility: HOSPITAL | Age: 67
Discharge: HOME | End: 2022-09-21
Attending: INTERNAL MEDICINE | Admitting: INTERNAL MEDICINE
Payer: MEDICARE

## 2022-09-21 DIAGNOSIS — K44.9 HIATAL HERNIA: ICD-10-CM

## 2022-09-21 DIAGNOSIS — K63.5 POLYP OF COLON, UNSPECIFIED PART OF COLON, UNSPECIFIED TYPE: Primary | ICD-10-CM

## 2022-09-21 DIAGNOSIS — K29.70 GASTRITIS, PRESENCE OF BLEEDING UNSPECIFIED, UNSPECIFIED CHRONICITY, UNSPECIFIED GASTRITIS TYPE: ICD-10-CM

## 2022-09-21 DIAGNOSIS — K57.90 DIVERTICULOSIS: ICD-10-CM

## 2022-09-21 DIAGNOSIS — D50.9 IDA (IRON DEFICIENCY ANEMIA): ICD-10-CM

## 2022-09-21 DIAGNOSIS — K64.8 INTERNAL HEMORRHOIDS: ICD-10-CM

## 2022-09-21 LAB
BASOPHILS # BLD AUTO: 0.02 K/UL (ref 0–0.2)
BASOPHILS NFR BLD: 0.3 % (ref 0–1.9)
DIFFERENTIAL METHOD: ABNORMAL
EOSINOPHIL # BLD AUTO: 0.1 K/UL (ref 0–0.5)
EOSINOPHIL NFR BLD: 1.4 % (ref 0–8)
ERYTHROCYTE [DISTWIDTH] IN BLOOD BY AUTOMATED COUNT: 16.1 % (ref 11.5–14.5)
FERRITIN SERPL-MCNC: 16 NG/ML (ref 20–300)
HCT VFR BLD AUTO: 25.6 % (ref 40–54)
HGB BLD-MCNC: 7.9 G/DL (ref 14–18)
IMM GRANULOCYTES # BLD AUTO: 0.01 K/UL (ref 0–0.04)
IMM GRANULOCYTES NFR BLD AUTO: 0.2 % (ref 0–0.5)
IRON SERPL-MCNC: 19 UG/DL (ref 45–160)
LYMPHOCYTES # BLD AUTO: 0.7 K/UL (ref 1–4.8)
LYMPHOCYTES NFR BLD: 10.9 % (ref 18–48)
MCH RBC QN AUTO: 26.4 PG (ref 27–31)
MCHC RBC AUTO-ENTMCNC: 30.9 G/DL (ref 32–36)
MCV RBC AUTO: 86 FL (ref 82–98)
MONOCYTES # BLD AUTO: 0.4 K/UL (ref 0.3–1)
MONOCYTES NFR BLD: 6.5 % (ref 4–15)
NEUTROPHILS # BLD AUTO: 5.1 K/UL (ref 1.8–7.7)
NEUTROPHILS NFR BLD: 80.7 % (ref 38–73)
NRBC BLD-RTO: 0 /100 WBC
PLATELET # BLD AUTO: 238 K/UL (ref 150–450)
PMV BLD AUTO: 8.6 FL (ref 9.2–12.9)
RBC # BLD AUTO: 2.99 M/UL (ref 4.6–6.2)
SATURATED IRON: 4 % (ref 20–50)
TOTAL IRON BINDING CAPACITY: 500 UG/DL (ref 250–450)
TRANSFERRIN SERPL-MCNC: 338 MG/DL (ref 200–375)
WBC # BLD AUTO: 6.26 K/UL (ref 3.9–12.7)

## 2022-09-21 PROCEDURE — 88305 TISSUE EXAM BY PATHOLOGIST: CPT | Mod: 59 | Performed by: PATHOLOGY

## 2022-09-21 PROCEDURE — 45385 PR COLONOSCOPY,REMV LESN,SNARE: ICD-10-PCS | Mod: ,,, | Performed by: INTERNAL MEDICINE

## 2022-09-21 PROCEDURE — 85025 COMPLETE CBC W/AUTO DIFF WBC: CPT | Performed by: INTERNAL MEDICINE

## 2022-09-21 PROCEDURE — 43239 PR EGD, FLEX, W/BIOPSY, SGL/MULTI: ICD-10-PCS | Mod: 51,,, | Performed by: INTERNAL MEDICINE

## 2022-09-21 PROCEDURE — 43239 EGD BIOPSY SINGLE/MULTIPLE: CPT | Performed by: INTERNAL MEDICINE

## 2022-09-21 PROCEDURE — 88342 IMHCHEM/IMCYTCHM 1ST ANTB: CPT | Mod: 26,,, | Performed by: PATHOLOGY

## 2022-09-21 PROCEDURE — D9220A PRA ANESTHESIA: Mod: CRNA,,, | Performed by: NURSE ANESTHETIST, CERTIFIED REGISTERED

## 2022-09-21 PROCEDURE — 88341 PR IHC OR ICC EACH ADD'L SINGLE ANTIBODY  STAINPR: ICD-10-PCS | Mod: 26,,, | Performed by: PATHOLOGY

## 2022-09-21 PROCEDURE — 84466 ASSAY OF TRANSFERRIN: CPT | Performed by: INTERNAL MEDICINE

## 2022-09-21 PROCEDURE — 63600175 PHARM REV CODE 636 W HCPCS: Performed by: NURSE ANESTHETIST, CERTIFIED REGISTERED

## 2022-09-21 PROCEDURE — 43239 EGD BIOPSY SINGLE/MULTIPLE: CPT | Mod: 51,,, | Performed by: INTERNAL MEDICINE

## 2022-09-21 PROCEDURE — 88342 IMHCHEM/IMCYTCHM 1ST ANTB: CPT | Mod: 91 | Performed by: PATHOLOGY

## 2022-09-21 PROCEDURE — 45385 COLONOSCOPY W/LESION REMOVAL: CPT | Mod: ,,, | Performed by: INTERNAL MEDICINE

## 2022-09-21 PROCEDURE — D9220A PRA ANESTHESIA: ICD-10-PCS | Mod: ANES,,, | Performed by: ANESTHESIOLOGY

## 2022-09-21 PROCEDURE — 88341 IMHCHEM/IMCYTCHM EA ADD ANTB: CPT | Mod: 59 | Performed by: PATHOLOGY

## 2022-09-21 PROCEDURE — 27201012 HC FORCEPS, HOT/COLD, DISP: Performed by: INTERNAL MEDICINE

## 2022-09-21 PROCEDURE — 36415 COLL VENOUS BLD VENIPUNCTURE: CPT | Performed by: INTERNAL MEDICINE

## 2022-09-21 PROCEDURE — 88305 TISSUE EXAM BY PATHOLOGIST: CPT | Mod: 26,,, | Performed by: PATHOLOGY

## 2022-09-21 PROCEDURE — 25000003 PHARM REV CODE 250: Performed by: NURSE ANESTHETIST, CERTIFIED REGISTERED

## 2022-09-21 PROCEDURE — 88342 CHG IMMUNOCYTOCHEMISTRY: ICD-10-PCS | Mod: 26,,, | Performed by: PATHOLOGY

## 2022-09-21 PROCEDURE — D9220A PRA ANESTHESIA: Mod: ANES,,, | Performed by: ANESTHESIOLOGY

## 2022-09-21 PROCEDURE — 88342 IMHCHEM/IMCYTCHM 1ST ANTB: CPT | Performed by: PATHOLOGY

## 2022-09-21 PROCEDURE — 27201089 HC SNARE, DISP (ANY): Performed by: INTERNAL MEDICINE

## 2022-09-21 PROCEDURE — 25000003 PHARM REV CODE 250: Performed by: INTERNAL MEDICINE

## 2022-09-21 PROCEDURE — 88305 TISSUE EXAM BY PATHOLOGIST: ICD-10-PCS | Mod: 26,,, | Performed by: PATHOLOGY

## 2022-09-21 PROCEDURE — 88341 IMHCHEM/IMCYTCHM EA ADD ANTB: CPT | Mod: 26,,, | Performed by: PATHOLOGY

## 2022-09-21 PROCEDURE — 45385 COLONOSCOPY W/LESION REMOVAL: CPT | Performed by: INTERNAL MEDICINE

## 2022-09-21 PROCEDURE — 37000009 HC ANESTHESIA EA ADD 15 MINS: Performed by: INTERNAL MEDICINE

## 2022-09-21 PROCEDURE — D9220A PRA ANESTHESIA: ICD-10-PCS | Mod: CRNA,,, | Performed by: NURSE ANESTHETIST, CERTIFIED REGISTERED

## 2022-09-21 PROCEDURE — 82728 ASSAY OF FERRITIN: CPT | Performed by: INTERNAL MEDICINE

## 2022-09-21 PROCEDURE — 37000008 HC ANESTHESIA 1ST 15 MINUTES: Performed by: INTERNAL MEDICINE

## 2022-09-21 RX ORDER — LIDOCAINE HCL/PF 100 MG/5ML
SYRINGE (ML) INTRAVENOUS
Status: DISCONTINUED | OUTPATIENT
Start: 2022-09-21 | End: 2022-09-21

## 2022-09-21 RX ORDER — SODIUM CHLORIDE 9 MG/ML
INJECTION, SOLUTION INTRAVENOUS CONTINUOUS
Status: DISCONTINUED | OUTPATIENT
Start: 2022-09-21 | End: 2022-09-21 | Stop reason: HOSPADM

## 2022-09-21 RX ORDER — PROPOFOL 10 MG/ML
VIAL (ML) INTRAVENOUS
Status: DISCONTINUED | OUTPATIENT
Start: 2022-09-21 | End: 2022-09-21

## 2022-09-21 RX ADMIN — SODIUM CHLORIDE: 0.9 INJECTION, SOLUTION INTRAVENOUS at 08:09

## 2022-09-21 RX ADMIN — PROPOFOL 40 MG: 10 INJECTION, EMULSION INTRAVENOUS at 09:09

## 2022-09-21 RX ADMIN — PROPOFOL 40 MG: 10 INJECTION, EMULSION INTRAVENOUS at 08:09

## 2022-09-21 RX ADMIN — LIDOCAINE HYDROCHLORIDE 100 MG: 20 INJECTION INTRAVENOUS at 08:09

## 2022-09-21 RX ADMIN — PROPOFOL 80 MG: 10 INJECTION, EMULSION INTRAVENOUS at 08:09

## 2022-09-21 NOTE — ANESTHESIA PREPROCEDURE EVALUATION
09/21/2022  Willi Diaz is a 67 y.o., male.      Pre-op Assessment    I have reviewed the Patient Summary Reports.     I have reviewed the Nursing Notes. I have reviewed the NPO Status.   I have reviewed the Medications.     Review of Systems  Anesthesia Hx:  Denies Family Hx of Anesthesia complications.   Denies Personal Hx of Anesthesia complications.   Cardiovascular:   Hypertension Dysrhythmias atrial fibrillation ECG has been reviewed.    Hepatic/GI:   Bowel Prep.    Endocrine:   Diabetes, type 2  Obesity / BMI > 30      Physical Exam  General: Cooperative, Alert and Oriented    Airway:  Mallampati: III   Mouth Opening: Normal  Tongue: Normal  Neck: Girth Increased    Chest/Lungs:  Normal Respiratory Rate    Heart:  Rate: Normal  Rhythm: Regular Rhythm        Anesthesia Plan  Type of Anesthesia, risks & benefits discussed:    Anesthesia Type: Gen Natural Airway  Intra-op Monitoring Plan: Standard ASA Monitors  Induction:  IV  Informed Consent: Informed consent signed with the Patient and all parties understand the risks and agree with anesthesia plan.  All questions answered.   ASA Score: 3    Ready For Surgery From Anesthesia Perspective.     .

## 2022-09-21 NOTE — ANESTHESIA POSTPROCEDURE EVALUATION
Anesthesia Post Evaluation    Patient: Willi Diaz    Procedure(s) Performed: Procedure(s) (LRB):  EGD (ESOPHAGOGASTRODUODENOSCOPY) (N/A)  COLONOSCOPY (N/A)    Final Anesthesia Type: general      Patient location during evaluation: PACU  Patient participation: Yes- Able to Participate  Level of consciousness: awake and alert  Post-procedure vital signs: reviewed and stable  Pain management: adequate  Airway patency: patent    PONV status at discharge: No PONV  Anesthetic complications: no      Cardiovascular status: blood pressure returned to baseline  Respiratory status: unassisted  Hydration status: euvolemic  Follow-up not needed.          Vitals Value Taken Time   /76 09/21/22 0945   Temp 36.2 °C (97.2 °F) 09/21/22 1000   Pulse 51 09/21/22 0945   Resp 15 09/21/22 0945   SpO2 99 % 09/21/22 0945         Event Time   Out of Recovery 09:55:27         Pain/Alayna Score: Alayna Score: 10 (9/21/2022  9:45 AM)

## 2022-09-21 NOTE — PROGRESS NOTES
Please advise patient that his iron studies remain persistently low despite ulcer healing.  Recommend pillcam for CHU.  Refer to hematology as well.

## 2022-09-21 NOTE — PROVATION PATIENT INSTRUCTIONS
Discharge Summary/Instructions after an Endoscopic Procedure  Patient Name: Willi Diaz  Patient MRN: [id number]  Patient YOB: 1955 Wednesday, September 21, 2022  Naseem Haney MD  Dear patient,  As a result of recent federal legislation (The Federal Cures Act), you may   receive lab or pathology results from your procedure in your [facility]   account before your physician is able to contact you. Your physician or   their representative will relay the results to you with their   recommendations at their soonest availability.  Thank you,  RESTRICTIONS:  During your procedure today, you received medications for sedation.  These   medications may affect your judgment, balance and coordination.  Therefore,   for 24 hours, you have the following restrictions:   - DO NOT drive a car, operate machinery, make legal/financial decisions,   sign important papers or drink alcohol.    ACTIVITY:  Today: no heavy lifting, straining or running due to procedural   sedation/anesthesia.  The following day: return to full activity including work.  DIET:  Eat and drink normally unless instructed otherwise.     TREATMENT FOR COMMON SIDE EFFECTS:  - Mild abdominal pain, nausea, belching, bloating or excessive gas:  rest,   eat lightly and use a heating pad.  - Sore Throat: treat with throat lozenges and/or gargle with warm salt   water.  - Because air was used during the procedure, expelling large amounts of air   from your rectum or belching is normal.  - If a bowel prep was taken, you may not have a bowel movement for 1-3 days.    This is normal.  SYMPTOMS TO WATCH FOR AND REPORT TO YOUR PHYSICIAN:  1. Abdominal pain or bloating, other than gas cramps.  2. Chest pain.  3. Back pain.  4. Signs of infection such as: chills or fever occurring within 24 hours   after the procedure.  5. Rectal bleeding, which would show as bright red, maroon, or black stools.   (A tablespoon of blood from the rectum is not serious, especially  if   hemorrhoids are present.)  6. Vomiting.  7. Weakness or dizziness.  GO DIRECTLY TO THE NEAREST EMERGENCY ROOM IF YOU HAVE ANY OF THE FOLLOWING:      Difficulty breathing              Chills and/or fever over 101 F   Persistent vomiting and/or vomiting blood   Severe abdominal pain   Severe chest pain   Black, tarry stools   Bleeding- more than one tablespoon   Any other symptom or condition that you feel may need urgent attention  Your doctor recommends these additional instructions:  If any biopsies were taken, your doctors clinic will contact you in 1 to 2   weeks with any results.  - Patient has a contact number available for emergencies.  The signs and   symptoms of potential delayed complications were discussed with the   patient.  Return to normal activities tomorrow.  Written discharge   instructions were provided to the patient.   - High fiber diet.   - Continue present medications.   - Await pathology results.   - Repeat colonoscopy in 5 years for surveillance.   - Discharge patient to home (ambulatory).   - Return to my office after studies are complete. CHECK CBC AND IRON STUDIES   TODAY.  IF PERSISTENTLY LOW, CONSIDER PILLCAM.  For questions, problems or results please call your physician - Naseem Haney MD at Work:  [phone].  [facility], EMERGENCY ROOM PHONE NUMBER: [phone]  IF A COMPLICATION OR EMERGENCY SITUATION ARISES AND YOU ARE UNABLE TO REACH   YOUR PHYSICIAN - GO DIRECTLY TO THE EMERGENCY ROOM.  Naseem Haney MD  9/21/2022 9:20:15 AM  This report has been verified and signed electronically.  Dear patient,  As a result of recent federal legislation (The Federal Cures Act), you may   receive lab or pathology results from your procedure in your [facility]   account before your physician is able to contact you. Your physician or   their representative will relay the results to you with their   recommendations at their soonest availability.  Thank you,  PROVATION

## 2022-09-21 NOTE — TRANSFER OF CARE
"Anesthesia Transfer of Care Note    Patient: Willi Diaz    Procedure(s) Performed: Procedure(s) (LRB):  EGD (ESOPHAGOGASTRODUODENOSCOPY) (N/A)  COLONOSCOPY (N/A)    Patient location: PACU    Anesthesia Type: general    Transport from OR: Transported from OR on room air with adequate spontaneous ventilation    Post pain: adequate analgesia    Post assessment: no apparent anesthetic complications and tolerated procedure well    Post vital signs: stable    Level of consciousness: responds to stimulation and sedated    Nausea/Vomiting: no nausea/vomiting    Complications: none    Transfer of care protocol was followed      Last vitals:   Visit Vitals  BP (!) 154/80   Pulse (!) 54   Temp 36.5 °C (97.7 °F) (Skin)   Resp 15   Ht 6' 3" (1.905 m)   Wt 115.7 kg (255 lb)   SpO2 99%   BMI 31.87 kg/m²     "

## 2022-09-21 NOTE — H&P
CC: History of PUD, CHU    67 year old male with above. States that symptoms are absent, no alleviating/exacerbating factors. No family history of colorectal CA. Positive personal history of polyps. No bleeding or weight loss.     ROS:  No headache, no fever/chills, no chest pain/SOB, no nausea/vomiting/diarrhea/constipation/GI bleeding/abdominal pain, no dysuria/hematuria.    VSSAF   Exam:   Alert and oriented x 3; no apparent distress   PERRLA, sclera anicteric  CV: Regular rate/rhythm, normal PMI   Lungs: Clear bilaterally with no wheeze/rales   Abdomen: Soft, NT/ND, normal bowel sounds   Ext: No cyanosis, clubbing     Impression:   As above    Plan:   Proceed with endoscopy. Further recs to follow.

## 2022-09-21 NOTE — PROVATION PATIENT INSTRUCTIONS
Discharge Summary/Instructions after an Endoscopic Procedure  Patient Name: Willi Diaz  Patient MRN: [id number]  Patient YOB: 1955 Wednesday, September 21, 2022  Naseem Haney MD  Dear patient,  As a result of recent federal legislation (The Federal Cures Act), you may   receive lab or pathology results from your procedure in your [facility]   account before your physician is able to contact you. Your physician or   their representative will relay the results to you with their   recommendations at their soonest availability.  Thank you,  RESTRICTIONS:  During your procedure today, you received medications for sedation.  These   medications may affect your judgment, balance and coordination.  Therefore,   for 24 hours, you have the following restrictions:   - DO NOT drive a car, operate machinery, make legal/financial decisions,   sign important papers or drink alcohol.    ACTIVITY:  Today: no heavy lifting, straining or running due to procedural   sedation/anesthesia.  The following day: return to full activity including work.  DIET:  Eat and drink normally unless instructed otherwise.     TREATMENT FOR COMMON SIDE EFFECTS:  - Mild abdominal pain, nausea, belching, bloating or excessive gas:  rest,   eat lightly and use a heating pad.  - Sore Throat: treat with throat lozenges and/or gargle with warm salt   water.  - Because air was used during the procedure, expelling large amounts of air   from your rectum or belching is normal.  - If a bowel prep was taken, you may not have a bowel movement for 1-3 days.    This is normal.  SYMPTOMS TO WATCH FOR AND REPORT TO YOUR PHYSICIAN:  1. Abdominal pain or bloating, other than gas cramps.  2. Chest pain.  3. Back pain.  4. Signs of infection such as: chills or fever occurring within 24 hours   after the procedure.  5. Rectal bleeding, which would show as bright red, maroon, or black stools.   (A tablespoon of blood from the rectum is not serious, especially  if   hemorrhoids are present.)  6. Vomiting.  7. Weakness or dizziness.  GO DIRECTLY TO THE NEAREST EMERGENCY ROOM IF YOU HAVE ANY OF THE FOLLOWING:      Difficulty breathing              Chills and/or fever over 101 F   Persistent vomiting and/or vomiting blood   Severe abdominal pain   Severe chest pain   Black, tarry stools   Bleeding- more than one tablespoon   Any other symptom or condition that you feel may need urgent attention  Your doctor recommends these additional instructions:  If any biopsies were taken, your doctors clinic will contact you in 1 to 2   weeks with any results.  - Patient has a contact number available for emergencies.  The signs and   symptoms of potential delayed complications were discussed with the   patient.  Return to normal activities tomorrow.  Written discharge   instructions were provided to the patient.   - Resume previous diet.   - Continue present medications.   - No aspirin, ibuprofen, naproxen, or other non-steroidal anti-inflammatory   drugs.   - Await pathology results.   - Discharge patient to home (ambulatory).   - Follow an antireflux regimen.   - Perform a colonoscopy today.   - Return to my office PRN.  For questions, problems or results please call your physician - Naseem Haney MD at Work:  [phone].  [facility], EMERGENCY ROOM PHONE NUMBER: [phone]  IF A COMPLICATION OR EMERGENCY SITUATION ARISES AND YOU ARE UNABLE TO REACH   YOUR PHYSICIAN - GO DIRECTLY TO THE EMERGENCY ROOM.  Naseem Haney MD  9/21/2022 8:58:42 AM  This report has been verified and signed electronically.  Dear patient,  As a result of recent federal legislation (The Federal Cures Act), you may   receive lab or pathology results from your procedure in your [facility]   account before your physician is able to contact you. Your physician or   their representative will relay the results to you with their   recommendations at their soonest availability.  Thank  you,  PROVATION

## 2022-09-21 NOTE — PLAN OF CARE
Vss, cem po fluids, denies pain, ambulates easily. IV removed, catheter intact. Discharge instructions provided and states understanding. States ready to go home.  Discharged from facility with family per wheelchair.

## 2022-09-22 VITALS
WEIGHT: 255 LBS | HEIGHT: 75 IN | HEART RATE: 51 BPM | OXYGEN SATURATION: 99 % | SYSTOLIC BLOOD PRESSURE: 135 MMHG | BODY MASS INDEX: 31.71 KG/M2 | RESPIRATION RATE: 15 BRPM | DIASTOLIC BLOOD PRESSURE: 76 MMHG | TEMPERATURE: 97 F

## 2022-10-16 PROBLEM — D64.9 ANEMIA, UNSPECIFIED: Status: ACTIVE | Noted: 2022-10-16

## 2022-10-16 PROBLEM — D50.9 IRON DEFICIENCY ANEMIA: Status: ACTIVE | Noted: 2022-10-16

## 2022-10-16 PROBLEM — D64.9 NORMOCHROMIC NORMOCYTIC ANEMIA: Status: ACTIVE | Noted: 2022-10-16

## 2022-10-16 PROBLEM — D50.0 ANEMIA DUE TO CHRONIC BLOOD LOSS: Status: ACTIVE | Noted: 2022-10-16

## 2022-10-17 ENCOUNTER — OFFICE VISIT (OUTPATIENT)
Dept: HEMATOLOGY/ONCOLOGY | Facility: CLINIC | Age: 67
End: 2022-10-17
Payer: MEDICARE

## 2022-10-17 VITALS
HEIGHT: 75 IN | RESPIRATION RATE: 18 BRPM | WEIGHT: 240.69 LBS | SYSTOLIC BLOOD PRESSURE: 146 MMHG | DIASTOLIC BLOOD PRESSURE: 67 MMHG | HEART RATE: 75 BPM | TEMPERATURE: 98 F | BODY MASS INDEX: 29.93 KG/M2

## 2022-10-17 DIAGNOSIS — D53.9 NUTRITIONAL ANEMIA, UNSPECIFIED: ICD-10-CM

## 2022-10-17 DIAGNOSIS — D50.0 ANEMIA DUE TO CHRONIC BLOOD LOSS: ICD-10-CM

## 2022-10-17 DIAGNOSIS — D64.9 NORMOCHROMIC NORMOCYTIC ANEMIA: ICD-10-CM

## 2022-10-17 DIAGNOSIS — D64.9 ANEMIA, UNSPECIFIED TYPE: ICD-10-CM

## 2022-10-17 DIAGNOSIS — K25.4 GASTROINTESTINAL HEMORRHAGE ASSOCIATED WITH GASTRIC ULCER: ICD-10-CM

## 2022-10-17 DIAGNOSIS — D62 ACUTE BLOOD LOSS ANEMIA: Primary | ICD-10-CM

## 2022-10-17 DIAGNOSIS — D50.0 IRON DEFICIENCY ANEMIA DUE TO CHRONIC BLOOD LOSS: ICD-10-CM

## 2022-10-17 LAB
FINAL PATHOLOGIC DIAGNOSIS: NORMAL
GROSS: NORMAL
Lab: NORMAL
MICROSCOPIC EXAM: NORMAL

## 2022-10-17 PROCEDURE — 99203 PR OFFICE/OUTPT VISIT, NEW, LEVL III, 30-44 MIN: ICD-10-PCS | Mod: S$GLB,,, | Performed by: INTERNAL MEDICINE

## 2022-10-17 PROCEDURE — 3044F PR MOST RECENT HEMOGLOBIN A1C LEVEL <7.0%: ICD-10-PCS | Mod: CPTII,S$GLB,, | Performed by: INTERNAL MEDICINE

## 2022-10-17 PROCEDURE — 99203 OFFICE O/P NEW LOW 30 MIN: CPT | Mod: S$GLB,,, | Performed by: INTERNAL MEDICINE

## 2022-10-17 PROCEDURE — 1101F PT FALLS ASSESS-DOCD LE1/YR: CPT | Mod: CPTII,S$GLB,, | Performed by: INTERNAL MEDICINE

## 2022-10-17 PROCEDURE — 3288F PR FALLS RISK ASSESSMENT DOCUMENTED: ICD-10-PCS | Mod: CPTII,S$GLB,, | Performed by: INTERNAL MEDICINE

## 2022-10-17 PROCEDURE — 1159F MED LIST DOCD IN RCRD: CPT | Mod: CPTII,S$GLB,, | Performed by: INTERNAL MEDICINE

## 2022-10-17 PROCEDURE — 3288F FALL RISK ASSESSMENT DOCD: CPT | Mod: CPTII,S$GLB,, | Performed by: INTERNAL MEDICINE

## 2022-10-17 PROCEDURE — 1160F RVW MEDS BY RX/DR IN RCRD: CPT | Mod: CPTII,S$GLB,, | Performed by: INTERNAL MEDICINE

## 2022-10-17 PROCEDURE — 3044F HG A1C LEVEL LT 7.0%: CPT | Mod: CPTII,S$GLB,, | Performed by: INTERNAL MEDICINE

## 2022-10-17 PROCEDURE — 1126F AMNT PAIN NOTED NONE PRSNT: CPT | Mod: CPTII,S$GLB,, | Performed by: INTERNAL MEDICINE

## 2022-10-17 PROCEDURE — 1101F PR PT FALLS ASSESS DOC 0-1 FALLS W/OUT INJ PAST YR: ICD-10-PCS | Mod: CPTII,S$GLB,, | Performed by: INTERNAL MEDICINE

## 2022-10-17 PROCEDURE — 1126F PR PAIN SEVERITY QUANTIFIED, NO PAIN PRESENT: ICD-10-PCS | Mod: CPTII,S$GLB,, | Performed by: INTERNAL MEDICINE

## 2022-10-17 PROCEDURE — 3077F SYST BP >= 140 MM HG: CPT | Mod: CPTII,S$GLB,, | Performed by: INTERNAL MEDICINE

## 2022-10-17 PROCEDURE — 3078F DIAST BP <80 MM HG: CPT | Mod: CPTII,S$GLB,, | Performed by: INTERNAL MEDICINE

## 2022-10-17 PROCEDURE — 3077F PR MOST RECENT SYSTOLIC BLOOD PRESSURE >= 140 MM HG: ICD-10-PCS | Mod: CPTII,S$GLB,, | Performed by: INTERNAL MEDICINE

## 2022-10-17 PROCEDURE — 3078F PR MOST RECENT DIASTOLIC BLOOD PRESSURE < 80 MM HG: ICD-10-PCS | Mod: CPTII,S$GLB,, | Performed by: INTERNAL MEDICINE

## 2022-10-17 PROCEDURE — 1159F PR MEDICATION LIST DOCUMENTED IN MEDICAL RECORD: ICD-10-PCS | Mod: CPTII,S$GLB,, | Performed by: INTERNAL MEDICINE

## 2022-10-17 PROCEDURE — 1160F PR REVIEW ALL MEDS BY PRESCRIBER/CLIN PHARMACIST DOCUMENTED: ICD-10-PCS | Mod: CPTII,S$GLB,, | Performed by: INTERNAL MEDICINE

## 2022-10-17 RX ORDER — IRON,CARBONYL/ASCORBIC ACID 100-250 MG
1 TABLET ORAL DAILY
Qty: 30 EACH | Refills: 6 | Status: SHIPPED | OUTPATIENT
Start: 2022-10-17

## 2022-10-17 NOTE — PROGRESS NOTES
"[facility]ematolgy/Oncology  History & Physical    Subjective:      Patient ID:   NAME: Willi Diaz : 1955     67 y.o. male    Referring Doc: Laura Ceja NP/Fozia  Other Physicians: Vikash; Lev Charles; Morgan Marcus        Chief Complaint: anemia      HPI:  67 y.o. male with diagnosis of anemia who has been referred by Laura Ceja for evaluation by medical hematology/oncology. Patient drinks 1-2 cases of beer per week. He is feeling pretty "good". He feels he has less interested in doing things. No CP, SOB, HA's or N/V.    In late 2022, he had some blood in his stool. He also had atrial fibrillation and was on eliquis and NSAIDS at that time. He was taken off the eliquis and NSAIDS, had EGD and colonsocopy with Dr Suh.     He is semi-retired  and works part-time at [employer].     Discussed covid precautions and he has been vaccinated              ROS:   GEN: normal without any fever, night sweats or weight loss  HEENT: normal with no HA's, sore throat, stiff neck, changes in vision  CV: normal with no CP, SOB, PND, SYED or orthopnea  PULM: normal with no SOB, cough, hemoptysis, sputum or pleuritic pain  GI: normal with no abdominal pain, nausea, vomiting, constipation, diarrhea, melanotic stools, BRBPR, or hematemesis  : normal with no hematuria, dysuria  BREAST: normal with no mass, discharge, pain  SKIN: normal with no rash, erythema, bruising, or swelling       Past Medical/Surgical History:  Past Medical History:   Diagnosis Date    Anemia due to chronic blood loss 10/16/2022    Anemia, unspecified 10/16/2022    Anticoagulant long-term use     Arthritis     Diabetes mellitus     type 2    Encounter for blood transfusion     GI bleed     Hypertension     Iron deficiency anemia 10/16/2022    Normochromic normocytic anemia 10/16/2022    Paroxysmal atrial fibrillation      Past Surgical History:   Procedure Laterality Date    COLONOSCOPY N/A 2022    Procedure: " COLONOSCOPY;  Surgeon: Naseem Suh MD;  Location: [facility] ENDO;  Service: Endoscopy;  Laterality: N/A;    ESOPHAGOGASTRODUODENOSCOPY N/A 7/27/2022    Procedure: EGD (ESOPHAGOGASTRODUODENOSCOPY);  Surgeon: Naseem Suh MD;  Location: [facility] ENDO;  Service: Endoscopy;  Laterality: N/A;    ESOPHAGOGASTRODUODENOSCOPY N/A 9/21/2022    Procedure: EGD (ESOPHAGOGASTRODUODENOSCOPY);  Surgeon: Naseem Suh MD;  Location: [facility] ENDO;  Service: Endoscopy;  Laterality: N/A;         Allergies:  Review of patient's allergies indicates:   Allergen Reactions    Ace inhibitors Other (See Comments) and Swelling    Pcn [penicillins] Itching and Swelling       Social/Family History:  Social History     Socioeconomic History    Marital status: Significant Other   Tobacco Use    Smoking status: Former    Smokeless tobacco: Never   Substance and Sexual Activity    Alcohol use: Yes     Alcohol/week: 30.0 standard drinks     Types: 30 Cans of beer per week     Comment: daily    Drug use: Never     Family History   Family history unknown: Yes         Medications:    Current Outpatient Medications:     allopurinoL (ZYLOPRIM) 300 MG tablet, Take 300 mg by mouth once daily., Disp: , Rfl:     baclofen (LIORESAL) 10 MG tablet, baclofen 10 mg tablet  TAKE 1 TABLET 3 TIMES A DAY BY ORAL ROUTE AS NEEDED., Disp: , Rfl:     benzonatate (TESSALON) 100 MG capsule, benzonatate 100 mg capsule, Disp: , Rfl:     cetirizine (ZYRTEC) 10 MG tablet, cetirizine 10 mg tablet  TAKE 1 TABLET BY MOUTH EVERY DAY, Disp: , Rfl:     diltiaZEM (CARDIZEM CD) 180 MG 24 hr capsule, Take 180 mg by mouth once daily., Disp: , Rfl:     ELIQUIS 5 mg Tab, Take 5 mg by mouth 2 (two) times daily., Disp: , Rfl:     gabapentin (NEURONTIN) 600 MG tablet, TAKE 1 TABLET BY MOUTH TWICE A DAY, Disp: , Rfl:     losartan-hydrochlorothiazide 50-12.5 mg (HYZAAR) 50-12.5 mg per tablet, Take 1 tablet by mouth once daily., Disp: , Rfl:     lovastatin (MEVACOR) 20 MG tablet, 1 tablet  "with the evening meal, Disp: , Rfl:     meloxicam (MOBIC) 7.5 MG tablet, meloxicam 7.5 mg tablet, Disp: , Rfl:     metFORMIN (GLUCOPHAGE) 1000 MG tablet, metformin 1,000 mg tablet, Disp: , Rfl:     molnupiravir 200 mg capsule (EUA), molnupiravir 200 mg capsule (EUA)  TAKE 4 CAPSULES BY MOUTH EVERY 12 HOURS FOR 5 DAYS, Disp: , Rfl:     multivitamin-iron-folic acid (CENTRUM COMPLETE) Tab, 1 tablet, Disp: , Rfl:     omega 3-dha-epa-fish oil 1,200 (144-216) mg Cap, 1 capsule, Disp: , Rfl:     omeprazole (PRILOSEC) 20 MG capsule, omeprazole 20 mg capsule,delayed release, Disp: , Rfl:     pantoprazole (PROTONIX) 40 MG tablet, Take 1 tablet (40 mg total) by mouth 2 (two) times daily., Disp: 60 tablet, Rfl: 11    predniSONE (DELTASONE) 20 MG tablet, Take 20 mg by mouth once daily., Disp: , Rfl:     promethazine-dextromethorphan (PROMETHAZINE-DM) 6.25-15 mg/5 mL Syrp, promethazine-DM 6.25 mg-15 mg/5 mL oral syrup, Disp: , Rfl:       Pathology:   Cancer Staging   No matching staging information was found for the patient.      Objective:   Vitals:  Blood pressure (!) 146/67, pulse 75, temperature 97.7 °F (36.5 °C), resp. rate 18, height 6' 3" (1.905 m), weight 109.2 kg (240 lb 11.2 oz).    Physical Examination:   GEN: no apparent distress, comfortable; AAOx3  HEAD: atraumatic and normocephalic  EYES: no pallor, no icterus, PERRLA  ENT: OMM, no pharyngeal erythema, external ears WNL; no nasal discharge; no thrush  NECK: no masses, thyroid normal, trachea midline, no LAD/LN's, supple  CV: RRR with no murmur; normal pulse; normal S1 and S2; no pedal edema  CHEST: Normal respiratory effort; CTAB; normal breath sounds; no wheeze or crackles  ABDOM: nontender and nondistended; soft; normal bowel sounds; no rebound/guarding  MUSC/Skeletal: ROM normal; no crepitus; joints normal; no deformities or arthropathy  EXTREM: no clubbing, cyanosis, inflammation or swelling  SKIN: no rashes, lesions, ulcers, petechiae or subcutaneous " nodules  : no burks  NEURO: grossly intact; motor/sensory WNL; AAOx3; no tremors  PSYCH: normal mood, affect and behavior  LYMPH: normal cervical, supraclavicular, axillary and groin LN's      Labs:   Lab Results   Component Value Date    WBC 6.26 09/21/2022    HGB 7.9 (L) 09/21/2022    HCT 25.6 (L) 09/21/2022    MCV 86 09/21/2022     09/21/2022    CMP  Sodium   Date Value Ref Range Status   07/27/2022 141 136 - 145 mmol/L Final     Potassium   Date Value Ref Range Status   07/27/2022 4.2 3.5 - 5.1 mmol/L Final     Chloride   Date Value Ref Range Status   07/27/2022 111 (H) 95 - 110 mmol/L Final     CO2   Date Value Ref Range Status   07/27/2022 22 (L) 23 - 29 mmol/L Final     Glucose   Date Value Ref Range Status   07/27/2022 90 70 - 110 mg/dL Final     BUN   Date Value Ref Range Status   07/27/2022 33 (H) 8 - 23 mg/dL Final     Creatinine   Date Value Ref Range Status   07/27/2022 1.3 0.5 - 1.4 mg/dL Final     Calcium   Date Value Ref Range Status   07/27/2022 8.5 (L) 8.7 - 10.5 mg/dL Final     Anion Gap   Date Value Ref Range Status   07/27/2022 8 8 - 16 mmol/L Final     eGFR if    Date Value Ref Range Status   07/27/2022 >60 >60 mL/min/1.73 m^2 Final     eGFR if non    Date Value Ref Range Status   07/27/2022 56 (A) >60 mL/min/1.73 m^2 Final     Comment:     Calculation used to obtain the estimated glomerular filtration  rate (eGFR) is the CKD-EPI equation.        Lab Results   Component Value Date    IRON 19 (L) 09/21/2022    TRANSFERRIN 338 09/21/2022    TIBC 500 (H) 09/21/2022    FESATURATED 4 (L) 09/21/2022          Radiology/Diagnostic Studies:          All lab results and imaging results have been reviewed and discussed with the patient    Assessment:   (1) 67 y.o. male with diagnosis of anemia who has been referred by Laura Ceja for evaluation by medical hematology/oncology. Patient drinks 1-2 cases of beer per week.   - suspected multifactorial anemia  process  - NCNC parameters  - anemia due to chronic marrow suppression from alcoholism, anemia of chronic disorders, iron deficiency anemia and chronic GIB (exacerbated but chronic use of anticoagulants)  - he is not taking any iron supplements    (2) heavy alcohol consumption on regular basis    (3) Atrial fibrillation - on longterm anticoagulation with eliquis    (4) DM Type II    (5) HTN    (6) Former smoker    (7) Arthrtitis    VISIT DIAGNOSES:              Acute blood loss anemia    Anemia, unspecified type    Normochromic normocytic anemia    Gastrointestinal hemorrhage associated with gastric ulcer    Anemia due to chronic blood loss    Iron deficiency anemia due to chronic blood loss          Plan:     PLAN:  Order retic, SPEP, hgb electrophoresis, thalassemia panel  F/u with PCP, GI, Card  Start oral iron with ICAR-C and check labs monthly  If he can not tolerate the oral iron or if it turns out t be ineffective, then will consider IV iron    RTC in  4-6 weeks   Fax note to Marcial Torres/Jimbo Marcus      COVID-19 Discussion:    I had long discussion with patient and any applicable family about the COVID-19 coronavirus epidemic and the recommended precautions with regard to cancer and/or hematology patients. I have re-iterated the CDC recommendations for adequate hand washing, use of hand -like products, and coughing into elbow, etc. In addition, especially for our patients who are on chemotherapy and/or our otherwise immunocompromised patients, I have recommended avoidance of crowds, including movie theaters, restaurants, churches, etc. I have recommended avoidance of any sick or symptomatic family members and/or friends. I have also recommended avoidance of any raw and unwashed food products, and general avoidance of food items that have not been prepared by themselves. The patient has been asked to call us immediately with any symptom developments, issues, questions or other general  concerns.       Iron Infusion Therapy Discussion:     I provided literature/learning materials on the particular IV iron regimen and discussed the potential side-effect profiles of the drug(s). I discussed the importance of compliance with obtaining and monitoring requested lab work, and went over the potential risk for the development of anaphylactic shock, bronchospasm, dysrhythmia, liver and/or kidney damage, and respiratory/cardiovascular arrest and/or failure. I discussed the potential risks for development of alopecia, fevers, itching, chills and/or rigors, cold sensory issues, ringing in ears, vertigo and neuropathy, all of which are usually acute but sometimes could end up being chronic and life-long. I discussed the risks of hand-foot syndrome and rashes, and development of other autoimmune mediated processes such as pneumonitis and colitis which could be life threatening.     The patient's consent has been obtained to proceed with the IV iron therapy.The patient will be referred to [facility] for training and education on IV iron therapy, use of antiemetics and/or anti-diarrheals, use of NSAID's, potential IV iron therapy side-effects, and any specific recommendations and precautions with the particular IV iron agents.      I answered all of the patient's (and family's, if applicable) questions to the best of my ability and to their complete satisfaction. The patient acknowledged full understanding of the risks, recommendations and plan(s).     I have explained and the patient understands all of  the current recommendation(s). I have answered all of their questions to the best of my ability and to their complete satisfaction.             Thank you for allowing me to participate in this patient's care. Please call with any questions or concerns.    Electronically signed Bennie Cordero MD

## 2022-10-19 NOTE — PROGRESS NOTES
Please notify patient that biopsies reviewed and showed no bacteria.  Continue current meds and follow up as previously planned.    Please advise patient that polyp pathology was reviewed and is benign and is the adenomatous type which is precancerous/risk factor for cancer.  Repeat colonoscopy recommended in 5 years.  Place reminder in system for repeat colonoscopy.

## 2022-10-27 ENCOUNTER — TELEPHONE (OUTPATIENT)
Dept: HEMATOLOGY/ONCOLOGY | Facility: CLINIC | Age: 67
End: 2022-10-27

## 2022-10-27 NOTE — TELEPHONE ENCOUNTER
----- Message from Bennie Cordero MD sent at 10/27/2022 11:38 AM CDT -----  See if he is interested in IV iron

## 2022-10-27 NOTE — TELEPHONE ENCOUNTER
After review of labs by Dr. Cordero, per his verbal orders, I spoke to patient to see if he would like to get IV iron, patient states he was just started on PO iron on 10/17, wants to know if he should cont the PO iron for now or do the IV iron instead. Dr. Cordero made aware, will call patient with his recs.

## 2022-10-28 ENCOUNTER — TELEPHONE (OUTPATIENT)
Dept: HEMATOLOGY/ONCOLOGY | Facility: CLINIC | Age: 67
End: 2022-10-28

## 2022-10-28 NOTE — TELEPHONE ENCOUNTER
----- Message from Bennie Cordero MD sent at 10/27/2022  6:19 PM CDT -----  Ok to try ICAR first    ----- Message -----  From: Jennifer Perez RN  Sent: 10/27/2022   2:01 PM CDT  To: Bennie Cordero MD    Spoke to patient he said you just started him on ICAR-C on 10/17, he wanted to know if you still wanted him to get IV iron or give the ICAR-C a try first.   ----- Message -----  From: Bennie Cordero MD  Sent: 10/27/2022  11:38 AM CDT  To: Gil Avery Staff    See if he is interested in IV iron

## 2022-10-28 NOTE — TELEPHONE ENCOUNTER
Spoke to patient, instructed to cont ICAR-C he was started on, cont to have monthly labs done and keep his f/u with Dr. Cordero on 11/23. Verbalized understanding.

## 2022-11-09 LAB
ALBUMIN SERPL ELPH-MCNC: 3.7 G/DL (ref 3.8–4.8)
ALBUMIN SERPL-MCNC: 3.9 G/DL (ref 3.6–5.1)
ALBUMIN/GLOB SERPL: 1.2 (CALC) (ref 1–2.5)
ALP SERPL-CCNC: 70 U/L (ref 35–144)
ALPHA1 GLOB SERPL ELPH-MCNC: 0.4 G/DL (ref 0.2–0.3)
ALPHA2 GLOB SERPL ELPH-MCNC: 1 G/DL (ref 0.5–0.9)
ALT SERPL-CCNC: 11 U/L (ref 9–46)
AST SERPL-CCNC: 21 U/L (ref 10–35)
BASOPHILS # BLD AUTO: 34 CELLS/UL (ref 0–200)
BASOPHILS NFR BLD AUTO: 0.4 %
BETA1 GLOB SERPL ELPH-MCNC: 0.5 G/DL (ref 0.4–0.6)
BETA2 GLOB SERPL ELPH-MCNC: 0.5 G/DL (ref 0.2–0.5)
BILIRUB SERPL-MCNC: 0.4 MG/DL (ref 0.2–1.2)
BUN SERPL-MCNC: 17 MG/DL (ref 7–25)
BUN/CREAT SERPL: 11 (CALC) (ref 6–22)
CALCIUM SERPL-MCNC: 9.3 MG/DL (ref 8.6–10.3)
CHLORIDE SERPL-SCNC: 104 MMOL/L (ref 98–110)
CO2 SERPL-SCNC: 28 MMOL/L (ref 20–32)
CREAT SERPL-MCNC: 1.55 MG/DL (ref 0.7–1.35)
EGFR: 48 ML/MIN/1.73M2
EOSINOPHIL # BLD AUTO: 102 CELLS/UL (ref 15–500)
EOSINOPHIL NFR BLD AUTO: 1.2 %
ERYTHROCYTE [DISTWIDTH] IN BLOOD BY AUTOMATED COUNT: 16 % (ref 11–15)
ERYTHROCYTE [DISTWIDTH] IN BLOOD BY AUTOMATED COUNT: 16 % (ref 11–15)
FERRITIN SERPL-MCNC: 10 NG/ML (ref 24–380)
FERRITIN SERPL-MCNC: 10 NG/ML (ref 24–380)
FOLATE SERPL-MCNC: 23.2 NG/ML
GAMMA GLOB SERPL ELPH-MCNC: 1.1 G/DL (ref 0.8–1.7)
GLOBULIN SER CALC-MCNC: 3.3 G/DL (CALC) (ref 1.9–3.7)
GLUCOSE SERPL-MCNC: 98 MG/DL (ref 65–139)
HBA1 GENE MUT ANL BLD/T: NORMAL
HCT VFR BLD AUTO: 28.6 % (ref 38.5–50)
HCT VFR BLD AUTO: 28.8 % (ref 38.5–50)
HGB A MFR BLD: 98 %
HGB A2 MFR BLD: 2 % (ref 2.2–3.2)
HGB BLD-MCNC: 8.7 G/DL (ref 13.2–17.1)
HGB BLD-MCNC: 8.9 G/DL (ref 13.2–17.1)
HGB F MFR BLD: 0 %
HGB FRACT BLD-IMP: ABNORMAL
IRON SATN MFR SERPL: 6 % (CALC) (ref 20–48)
IRON SERPL-MCNC: 29 MCG/DL (ref 50–180)
LDH SERPL P TO L-CCNC: 161 U/L (ref 120–250)
LYMPHOCYTES # BLD AUTO: 1165 CELLS/UL (ref 850–3900)
LYMPHOCYTES NFR BLD AUTO: 13.7 %
Lab: ABNORMAL
MCH RBC QN AUTO: 24.7 PG (ref 27–33)
MCH RBC QN AUTO: 25.2 PG (ref 27–33)
MCHC RBC AUTO-ENTMCNC: 30.4 G/DL (ref 32–36)
MCHC RBC AUTO-ENTMCNC: 30.9 G/DL (ref 32–36)
MCV RBC AUTO: 80 FL (ref 80–100)
MCV RBC AUTO: 82.9 FL (ref 80–100)
MONOCYTES # BLD AUTO: 621 CELLS/UL (ref 200–950)
MONOCYTES NFR BLD AUTO: 7.3 %
NEUTROPHILS # BLD AUTO: 6579 CELLS/UL (ref 1500–7800)
NEUTROPHILS NFR BLD AUTO: 77.4 %
PLATELET # BLD AUTO: 345 THOUSAND/UL (ref 140–400)
PMV BLD REES-ECKER: 9.5 FL (ref 7.5–12.5)
POTASSIUM SERPL-SCNC: 4.5 MMOL/L (ref 3.5–5.3)
PROT PATTERN SERPL ELPH-IMP: ABNORMAL
PROT SERPL-MCNC: 7.2 G/DL (ref 6.1–8.1)
PROT SERPL-MCNC: 7.2 G/DL (ref 6.1–8.1)
RBC # BLD AUTO: 3.45 MILL/UL (ref 4.2–5.8)
RBC # BLD AUTO: 3.6 MILLION/UL (ref 4.2–5.8)
REF LAB TEST METHOD: ABNORMAL
REF LAB TEST RESULTS: NORMAL
RETICS #: NORMAL CELLS/UL (ref 25000–90000)
RETICS/RBC NFR AUTO: 1.5 %
SODIUM SERPL-SCNC: 141 MMOL/L (ref 135–146)
TIBC SERPL-MCNC: 454 MCG/DL (CALC) (ref 250–425)
VIT B12 SERPL-MCNC: 466 PG/ML (ref 200–1100)
WBC # BLD AUTO: 8.5 THOUSAND/UL (ref 3.8–10.8)

## 2022-11-16 DIAGNOSIS — M25.562 KNEE PAIN, LEFT: Primary | ICD-10-CM

## 2022-11-17 ENCOUNTER — HOSPITAL ENCOUNTER (OUTPATIENT)
Dept: RADIOLOGY | Facility: HOSPITAL | Age: 67
Discharge: HOME | End: 2022-11-17
Attending: FAMILY MEDICINE
Payer: MEDICARE

## 2022-11-17 DIAGNOSIS — M25.562 KNEE PAIN, LEFT: ICD-10-CM

## 2022-11-17 PROCEDURE — 73562 X-RAY EXAM OF KNEE 3: CPT | Mod: TC,PO,LT

## 2023-03-01 ENCOUNTER — HOSPITAL ENCOUNTER (OUTPATIENT)
Dept: RADIOLOGY | Facility: HOSPITAL | Age: 68
Discharge: HOME | End: 2023-03-01
Attending: NURSE PRACTITIONER
Payer: MEDICARE

## 2023-03-01 DIAGNOSIS — Z77.090 ASBESTOS EXPOSURE: ICD-10-CM

## 2023-03-01 DIAGNOSIS — M25.562 LEFT KNEE PAIN: Primary | ICD-10-CM

## 2023-03-01 DIAGNOSIS — M25.562 LEFT KNEE PAIN: ICD-10-CM

## 2023-03-01 PROCEDURE — 71046 X-RAY EXAM CHEST 2 VIEWS: CPT | Mod: TC,PO

## 2023-03-01 PROCEDURE — 73560 X-RAY EXAM OF KNEE 1 OR 2: CPT | Mod: TC,PO,LT

## 2023-03-06 ENCOUNTER — TELEPHONE (OUTPATIENT)
Dept: ORTHOPEDICS | Facility: CLINIC | Age: 68
End: 2023-03-06
Payer: MEDICARE

## 2023-03-06 NOTE — TELEPHONE ENCOUNTER
----- Message from Roderick Jean Baptiste sent at 3/6/2023  9:27 AM CST -----  Caller: patient     Requesting Appt With Provider: Araceli    Preferred Date Range: asap    Preferred Times asap    Reason For Visit: ongoing pain in knee    Additional Information: [phone]

## 2023-03-07 ENCOUNTER — OFFICE VISIT (OUTPATIENT)
Dept: ORTHOPEDICS | Facility: CLINIC | Age: 68
End: 2023-03-07
Payer: MEDICARE

## 2023-03-07 VITALS — BODY MASS INDEX: 29.93 KG/M2 | WEIGHT: 240.75 LBS | HEIGHT: 75 IN

## 2023-03-07 DIAGNOSIS — M25.562 CHRONIC PAIN OF LEFT KNEE: ICD-10-CM

## 2023-03-07 DIAGNOSIS — M17.11 PRIMARY OSTEOARTHRITIS OF RIGHT KNEE: ICD-10-CM

## 2023-03-07 DIAGNOSIS — G89.29 CHRONIC PAIN OF LEFT KNEE: ICD-10-CM

## 2023-03-07 DIAGNOSIS — M17.12 PRIMARY OSTEOARTHRITIS OF LEFT KNEE: Primary | ICD-10-CM

## 2023-03-07 PROCEDURE — 3008F BODY MASS INDEX DOCD: CPT | Mod: CPTII,S$GLB,, | Performed by: FAMILY MEDICINE

## 2023-03-07 PROCEDURE — 3288F PR FALLS RISK ASSESSMENT DOCUMENTED: ICD-10-PCS | Mod: CPTII,S$GLB,, | Performed by: FAMILY MEDICINE

## 2023-03-07 PROCEDURE — 1101F PT FALLS ASSESS-DOCD LE1/YR: CPT | Mod: CPTII,S$GLB,, | Performed by: FAMILY MEDICINE

## 2023-03-07 PROCEDURE — 1101F PR PT FALLS ASSESS DOC 0-1 FALLS W/OUT INJ PAST YR: ICD-10-PCS | Mod: CPTII,S$GLB,, | Performed by: FAMILY MEDICINE

## 2023-03-07 PROCEDURE — 1125F AMNT PAIN NOTED PAIN PRSNT: CPT | Mod: CPTII,S$GLB,, | Performed by: FAMILY MEDICINE

## 2023-03-07 PROCEDURE — 99999 PR PBB SHADOW E&M-EST. PATIENT-LVL III: ICD-10-PCS | Mod: PBBFAC,,, | Performed by: FAMILY MEDICINE

## 2023-03-07 PROCEDURE — 99214 PR OFFICE/OUTPT VISIT, EST, LEVL IV, 30-39 MIN: ICD-10-PCS | Mod: 25,S$GLB,, | Performed by: FAMILY MEDICINE

## 2023-03-07 PROCEDURE — 1159F PR MEDICATION LIST DOCUMENTED IN MEDICAL RECORD: ICD-10-PCS | Mod: CPTII,S$GLB,, | Performed by: FAMILY MEDICINE

## 2023-03-07 PROCEDURE — 20610 LARGE JOINT ASPIRATION/INJECTION: L KNEE: ICD-10-PCS | Mod: LT,S$GLB,, | Performed by: FAMILY MEDICINE

## 2023-03-07 PROCEDURE — 3288F FALL RISK ASSESSMENT DOCD: CPT | Mod: CPTII,S$GLB,, | Performed by: FAMILY MEDICINE

## 2023-03-07 PROCEDURE — 3008F PR BODY MASS INDEX (BMI) DOCUMENTED: ICD-10-PCS | Mod: CPTII,S$GLB,, | Performed by: FAMILY MEDICINE

## 2023-03-07 PROCEDURE — 1125F PR PAIN SEVERITY QUANTIFIED, PAIN PRESENT: ICD-10-PCS | Mod: CPTII,S$GLB,, | Performed by: FAMILY MEDICINE

## 2023-03-07 PROCEDURE — 99214 OFFICE O/P EST MOD 30 MIN: CPT | Mod: 25,S$GLB,, | Performed by: FAMILY MEDICINE

## 2023-03-07 PROCEDURE — 99999 PR PBB SHADOW E&M-EST. PATIENT-LVL III: CPT | Mod: PBBFAC,,, | Performed by: FAMILY MEDICINE

## 2023-03-07 PROCEDURE — 1159F MED LIST DOCD IN RCRD: CPT | Mod: CPTII,S$GLB,, | Performed by: FAMILY MEDICINE

## 2023-03-07 PROCEDURE — 20610 DRAIN/INJ JOINT/BURSA W/O US: CPT | Mod: LT,S$GLB,, | Performed by: FAMILY MEDICINE

## 2023-03-07 RX ORDER — METHYLPREDNISOLONE ACETATE 80 MG/ML
80 INJECTION, SUSPENSION INTRA-ARTICULAR; INTRALESIONAL; INTRAMUSCULAR; SOFT TISSUE
Status: DISCONTINUED | OUTPATIENT
Start: 2023-03-07 | End: 2023-03-07 | Stop reason: HOSPADM

## 2023-03-07 RX ORDER — COLCHICINE 0.6 MG/1
TABLET ORAL
COMMUNITY
Start: 2023-03-06

## 2023-03-07 RX ADMIN — METHYLPREDNISOLONE ACETATE 80 MG: 80 INJECTION, SUSPENSION INTRA-ARTICULAR; INTRALESIONAL; INTRAMUSCULAR; SOFT TISSUE at 03:03

## 2023-03-07 NOTE — PROGRESS NOTES
Subjective:      Patient ID: Willi Diaz is a 67 y.o. male.    Chief Complaint: Pain of the Left Knee    Patient here today with complaints of left knee pain.  This is a recurrent issue for him.  Has previously been seen by Dr. Charles and has received several steroid injections in both of his knees.  Steroid injections usually work very well for him relieving his pain for over a year.  His last injection was given on August 15, 2022 in both knees.  His right knee is not bothering him today.  He notes the left knee is slightly different than typical as it is more swollen.  Has been taking Advil for relief of some good results.  Notes it is made worse when sitting from a standing position.  It is made worse with deep flexion of the knee.  Tender to touch at the joint line.    Pain  Pertinent negatives include no chest pain, chills, congestion, coughing, headaches, rash or sore throat.     Review of Systems   Constitutional: Negative for chills and decreased appetite.   HENT:  Negative for congestion and sore throat.    Eyes:  Negative for blurred vision.   Cardiovascular:  Negative for chest pain, dyspnea on exertion and palpitations.   Respiratory:  Negative for cough and shortness of breath.    Skin:  Negative for rash.   Neurological:  Negative for difficulty with concentration, disturbances in coordination and headaches.   Psychiatric/Behavioral:  Negative for altered mental status, depression, hallucinations, memory loss and suicidal ideas.        Objective:            General    Nursing note and vitals reviewed.  Constitutional: He is oriented to person, place, and time. He appears well-developed and well-nourished.   HENT:   Nose: Nose normal.   Eyes: EOM are normal. Pupils are equal, round, and reactive to light.   Neck: Neck supple.   Cardiovascular:  Normal rate.            Pulmonary/Chest: Effort normal.   Abdominal: Soft.   Neurological: He is alert and oriented to person, place, and time. He has  normal reflexes.   Psychiatric: He has a normal mood and affect. His behavior is normal. Judgment and thought content normal.           Right Knee Exam   Right knee exam is normal.    Inspection   Effusion: absent    Range of Motion   Extension:  50   Flexion:  140     Tests   Meniscus   Salma:  Medial - negative Lateral - negative  Ligament Examination   Lachman: normal (-1 to 2mm)   PCL-Posterior Drawer: normal (0 to 2mm)     MCL - Valgus: normal (0 to 2mm)  LCL - Varus: normal  Patella   Patellar apprehension: negative  Passive Patellar Tilt: neutral  Patellar Tracking: normal    Other   Popliteal (Baker's) Cyst: absent  Sensation: normal    Left Knee Exam     Inspection   Effusion: present    Tenderness   The patient tender to palpation of the medial joint line.    Crepitus   The patient has crepitus of the patella and medial joint line.    Range of Motion   Extension:  50   Flexion:  140     Tests   Meniscus   Salma:  Medial - negative Lateral - negative  Stability   Lachman: normal (-1 to 2mm)   PCL-Posterior Drawer: normal (0 to 2mm)  MCL - Valgus: normal (0 to 2mm)  LCL - Varus: normal (0 to 2mm)  Patella   Patellar apprehension: negative  Passive Patellar Tilt: neutral  Patellar Tracking: normal    Other   Popliteal (Baker's) Cyst: present  Sensation: normal    Muscle Strength   Right Lower Extremity   Quadriceps:  5/5   Hamstrin/5   Left Lower Extremity   Quadriceps:  5/5   Hamstrin/5     Vascular Exam     Right Pulses  Dorsalis Pedis:      2+          Left Pulses  Dorsalis Pedis:      2+                  Assessment:       Encounter Diagnoses   Name Primary?    Primary osteoarthritis of left knee Yes    Primary osteoarthritis of right knee     Chronic pain of left knee           Plan:       Willi was seen today for pain.    Diagnoses and all orders for this visit:    Primary osteoarthritis of left knee    Primary osteoarthritis of right knee    Chronic pain of left knee      Offered  him a repeat steroid injection in the left knee today he agreed with this.  May follow up here as needed.    Large Joint Aspiration/Injection: L knee    Date/Time: 3/7/2023 3:20 PM  Performed by: Pillo Olmos MD  Authorized by: Pillo Olmos MD     Consent Done?:  Yes (Verbal)  Indications:  Arthritis and pain  Site marked: the procedure site was marked    Timeout: prior to procedure the correct patient, procedure, and site was verified    Prep: patient was prepped and draped in usual sterile fashion      Local anesthesia used?: Yes    Local anesthetic:  Lidocaine 1% without epinephrine and topical anesthetic  Anesthetic total (ml):  2      Details:  Needle Size:  22 G  Ultrasonic Guidance for needle placement?: No    Approach:  Anterolateral  Location:  Knee  Site:  L knee  Medications:  80 mg methylPREDNISolone acetate 80 mg/mL  Patient tolerance:  Patient tolerated the procedure well with no immediate complications

## 2023-09-15 DIAGNOSIS — R60.9 EDEMA: ICD-10-CM

## 2023-09-15 DIAGNOSIS — I48.0 PAROXYSMAL A-FIB: ICD-10-CM

## 2023-09-15 DIAGNOSIS — I10 ESSENTIAL (PRIMARY) HYPERTENSION: Primary | ICD-10-CM

## 2023-09-28 ENCOUNTER — OFFICE VISIT (OUTPATIENT)
Dept: ORTHOPEDICS | Facility: CLINIC | Age: 68
End: 2023-09-28
Payer: MEDICARE

## 2023-09-28 VITALS — BODY MASS INDEX: 29.93 KG/M2 | HEIGHT: 75 IN | WEIGHT: 240.75 LBS

## 2023-09-28 DIAGNOSIS — M17.12 PRIMARY OSTEOARTHRITIS OF LEFT KNEE: Primary | ICD-10-CM

## 2023-09-28 DIAGNOSIS — G89.29 CHRONIC PAIN OF LEFT KNEE: ICD-10-CM

## 2023-09-28 DIAGNOSIS — M25.562 CHRONIC PAIN OF LEFT KNEE: ICD-10-CM

## 2023-09-28 PROCEDURE — 1101F PR PT FALLS ASSESS DOC 0-1 FALLS W/OUT INJ PAST YR: ICD-10-PCS | Mod: CPTII,S$GLB,, | Performed by: FAMILY MEDICINE

## 2023-09-28 PROCEDURE — 3008F BODY MASS INDEX DOCD: CPT | Mod: CPTII,S$GLB,, | Performed by: FAMILY MEDICINE

## 2023-09-28 PROCEDURE — 99999 PR PBB SHADOW E&M-EST. PATIENT-LVL III: CPT | Mod: PBBFAC,,, | Performed by: FAMILY MEDICINE

## 2023-09-28 PROCEDURE — 1101F PT FALLS ASSESS-DOCD LE1/YR: CPT | Mod: CPTII,S$GLB,, | Performed by: FAMILY MEDICINE

## 2023-09-28 PROCEDURE — 1159F MED LIST DOCD IN RCRD: CPT | Mod: CPTII,S$GLB,, | Performed by: FAMILY MEDICINE

## 2023-09-28 PROCEDURE — 99214 OFFICE O/P EST MOD 30 MIN: CPT | Mod: 25,S$GLB,, | Performed by: FAMILY MEDICINE

## 2023-09-28 PROCEDURE — 20610 DRAIN/INJ JOINT/BURSA W/O US: CPT | Mod: LT,S$GLB,, | Performed by: FAMILY MEDICINE

## 2023-09-28 PROCEDURE — 3008F PR BODY MASS INDEX (BMI) DOCUMENTED: ICD-10-PCS | Mod: CPTII,S$GLB,, | Performed by: FAMILY MEDICINE

## 2023-09-28 PROCEDURE — 1125F AMNT PAIN NOTED PAIN PRSNT: CPT | Mod: CPTII,S$GLB,, | Performed by: FAMILY MEDICINE

## 2023-09-28 PROCEDURE — 20610 LARGE JOINT ASPIRATION/INJECTION: L KNEE: ICD-10-PCS | Mod: LT,S$GLB,, | Performed by: FAMILY MEDICINE

## 2023-09-28 PROCEDURE — 3288F PR FALLS RISK ASSESSMENT DOCUMENTED: ICD-10-PCS | Mod: CPTII,S$GLB,, | Performed by: FAMILY MEDICINE

## 2023-09-28 PROCEDURE — 1125F PR PAIN SEVERITY QUANTIFIED, PAIN PRESENT: ICD-10-PCS | Mod: CPTII,S$GLB,, | Performed by: FAMILY MEDICINE

## 2023-09-28 PROCEDURE — 1159F PR MEDICATION LIST DOCUMENTED IN MEDICAL RECORD: ICD-10-PCS | Mod: CPTII,S$GLB,, | Performed by: FAMILY MEDICINE

## 2023-09-28 PROCEDURE — 99214 PR OFFICE/OUTPT VISIT, EST, LEVL IV, 30-39 MIN: ICD-10-PCS | Mod: 25,S$GLB,, | Performed by: FAMILY MEDICINE

## 2023-09-28 PROCEDURE — 99999 PR PBB SHADOW E&M-EST. PATIENT-LVL III: ICD-10-PCS | Mod: PBBFAC,,, | Performed by: FAMILY MEDICINE

## 2023-09-28 PROCEDURE — 3288F FALL RISK ASSESSMENT DOCD: CPT | Mod: CPTII,S$GLB,, | Performed by: FAMILY MEDICINE

## 2023-09-28 RX ORDER — METHYLPREDNISOLONE ACETATE 80 MG/ML
80 INJECTION, SUSPENSION INTRA-ARTICULAR; INTRALESIONAL; INTRAMUSCULAR; SOFT TISSUE
Status: DISCONTINUED | OUTPATIENT
Start: 2023-09-28 | End: 2023-09-28 | Stop reason: HOSPADM

## 2023-09-28 RX ADMIN — METHYLPREDNISOLONE ACETATE 80 MG: 80 INJECTION, SUSPENSION INTRA-ARTICULAR; INTRALESIONAL; INTRAMUSCULAR; SOFT TISSUE at 08:09

## 2023-09-28 NOTE — PROGRESS NOTES
Subjective:     Patient ID: Willi Diaz is a 68 y.o. male.    Chief Complaint: Pain of the Left Knee    Patient returns today with complaints of left knee pain and swelling secondary to osteoarthritis.  Had a steroid injection done in the left knee March of this year.  This relieved his pain for approximately five months.  Knee started to hurt about five months ago.  Feels the same as it did previously.  There is still no pain in the right knee.        Review of Systems   Constitutional: Negative for chills and decreased appetite.   HENT:  Negative for congestion and sore throat.    Eyes:  Negative for blurred vision.   Cardiovascular:  Negative for chest pain, dyspnea on exertion and palpitations.   Respiratory:  Negative for cough and shortness of breath.    Skin:  Negative for rash.   Neurological:  Negative for difficulty with concentration, disturbances in coordination and headaches.   Psychiatric/Behavioral:  Negative for altered mental status, depression, hallucinations, memory loss and suicidal ideas.        Objective:       General    Nursing note and vitals reviewed.  Constitutional: He is oriented to person, place, and time. He appears well-developed and well-nourished.   HENT:   Nose: Nose normal.   Eyes: EOM are normal. Pupils are equal, round, and reactive to light.   Neck: Neck supple.   Cardiovascular:  Normal rate.            Pulmonary/Chest: Effort normal.   Abdominal: Soft.   Neurological: He is alert and oriented to person, place, and time. He has normal reflexes.   Psychiatric: He has a normal mood and affect. His behavior is normal. Judgment and thought content normal.           Right Knee Exam   Right knee exam is normal.    Inspection   Effusion: absent    Range of Motion   Extension:  50   Flexion:  140     Tests   Meniscus   Salma:  Medial - negative Lateral - negative  Ligament Examination   Lachman: normal (-1 to 2mm)   PCL-Posterior Drawer: normal (0 to 2mm)     MCL - Valgus:  normal (0 to 2mm)  LCL - Varus: normal  Patella   Patellar apprehension: negative  Passive Patellar Tilt: neutral  Patellar Tracking: normal    Other   Popliteal (Baker's) Cyst: absent  Sensation: normal    Left Knee Exam     Inspection   Effusion: present    Tenderness   The patient tender to palpation of the medial joint line.    Crepitus   The patient has crepitus of the patella and medial joint line.    Range of Motion   Extension:  50   Flexion:  140     Tests   Meniscus   Salma:  Medial - negative Lateral - negative  Stability   Lachman: normal (-1 to 2mm)   PCL-Posterior Drawer: normal (0 to 2mm)  MCL - Valgus: normal (0 to 2mm)  LCL - Varus: normal (0 to 2mm)  Patella   Patellar apprehension: negative  Passive Patellar Tilt: neutral  Patellar Tracking: normal    Other   Popliteal (Baker's) Cyst: present  Sensation: normal    Muscle Strength   Right Lower Extremity   Quadriceps:  5/5   Hamstrin/5   Left Lower Extremity   Quadriceps:  5/5   Hamstrin/5     Vascular Exam     Right Pulses  Dorsalis Pedis:      2+          Left Pulses  Dorsalis Pedis:      2+        Physical Exam  Vitals and nursing note reviewed.   Constitutional:       Appearance: He is well-developed and well-nourished.   HENT:      Nose: Nose normal.   Eyes:      Extraocular Movements: EOM normal.      Pupils: Pupils are equal, round, and reactive to light.   Cardiovascular:      Rate and Rhythm: Normal rate.      Pulses:           Dorsalis pedis pulses are 2+ on the right side and 2+ on the left side.   Pulmonary:      Effort: Pulmonary effort is normal.   Abdominal:      Palpations: Abdomen is soft.   Musculoskeletal:      Cervical back: Normal range of motion and neck supple.      Right knee: No effusion.      Instability Tests: Medial Salma test negative and lateral Salma test negative.      Left knee: Effusion present.      Instability Tests: Medial Salma test negative and lateral Salma test negative.    Neurological:      Mental Status: He is alert and oriented to person, place, and time.      Deep Tendon Reflexes: Reflexes are normal and symmetric.   Psychiatric:         Mood and Affect: Mood and affect normal.         Behavior: Behavior normal.         Thought Content: Thought content normal.         Judgment: Judgment normal.     Assessment:     Encounter Diagnoses   Name Primary?    Primary osteoarthritis of left knee Yes    Chronic pain of left knee        Plan:      Willi was seen today for pain.    Diagnoses and all orders for this visit:    Primary osteoarthritis of left knee  -     Prior authorization Order    Chronic pain of left knee  -     Prior authorization Order      Will give him another steroid injection into the left knee today.  We discussed the additional option of viscosupplementation.  He would like to try that as well but would like to wait till the end of the year to begin series.  Will submit prior authorization and have him follow-up in late December.    Large Joint Aspiration/Injection: L knee    Date/Time: 9/28/2023 8:20 AM    Performed by: Pillo Olmos MD  Authorized by: Pillo Olmos MD    Consent Done?:  Yes (Verbal)  Indications:  Arthritis and pain  Site marked: the procedure site was marked    Timeout: prior to procedure the correct patient, procedure, and site was verified    Prep: patient was prepped and draped in usual sterile fashion      Local anesthesia used?: Yes    Local anesthetic:  Lidocaine 1% without epinephrine  Anesthetic total (ml):  2      Details:  Needle Size:  22 G  Ultrasonic Guidance for needle placement?: No    Approach:  Anterolateral  Location:  Knee  Site:  L knee  Medications:  80 mg methylPREDNISolone acetate 80 mg/mL  Patient tolerance:  Patient tolerated the procedure well with no immediate complications

## 2023-10-23 ENCOUNTER — HOSPITAL ENCOUNTER (OUTPATIENT)
Dept: CARDIOLOGY | Facility: HOSPITAL | Age: 68
Discharge: HOME | End: 2023-10-23
Attending: SPECIALIST
Payer: MEDICARE

## 2023-10-23 VITALS — HEIGHT: 75 IN | BODY MASS INDEX: 29.93 KG/M2 | WEIGHT: 240.75 LBS

## 2023-10-23 DIAGNOSIS — I10 ESSENTIAL (PRIMARY) HYPERTENSION: ICD-10-CM

## 2023-10-23 DIAGNOSIS — I48.0 PAROXYSMAL A-FIB: ICD-10-CM

## 2023-10-23 DIAGNOSIS — R60.9 EDEMA: ICD-10-CM

## 2023-10-23 PROCEDURE — 93306 TTE W/DOPPLER COMPLETE: CPT

## 2023-10-23 PROCEDURE — 93306 TTE W/DOPPLER COMPLETE: CPT | Mod: 26,,, | Performed by: INTERNAL MEDICINE

## 2023-10-23 PROCEDURE — 93306 ECHO (CUPID ONLY): ICD-10-PCS | Mod: 26,,, | Performed by: INTERNAL MEDICINE

## 2023-10-25 LAB
AORTIC ROOT ANNULUS: 3.1 CM
AORTIC VALVE CUSP SEPERATION: 2.3 CM
AV INDEX (PROSTH): 0.85
AV MEAN GRADIENT: 5 MMHG
AV PEAK GRADIENT: 8 MMHG
AV VALVE AREA BY VELOCITY RATIO: 3.29 CM²
AV VALVE AREA: 3.22 CM²
AV VELOCITY RATIO: 0.87
BSA FOR ECHO PROCEDURE: 2.4 M2
CV ECHO LV RWT: 0.48 CM
DOP CALC AO PEAK VEL: 1.42 M/S
DOP CALC AO VTI: 29.3 CM
DOP CALC LVOT AREA: 3.8 CM2
DOP CALC LVOT DIAMETER: 2.2 CM
DOP CALC LVOT PEAK VEL: 1.23 M/S
DOP CALC LVOT STROKE VOLUME: 94.23 CM3
DOP CALC MV VTI: 24.4 CM
DOP CALCLVOT PEAK VEL VTI: 24.8 CM
E WAVE DECELERATION TIME: 203 MSEC
E/A RATIO: 1.19
E/E' RATIO: 11.18 M/S
ECHO LV POSTERIOR WALL: 1.34 CM (ref 0.6–1.1)
FRACTIONAL SHORTENING: 36 % (ref 28–44)
INTERVENTRICULAR SEPTUM: 1.02 CM (ref 0.6–1.1)
IVC DIAMETER: 1.62 CM
IVRT: 74 MSEC
LEFT ATRIUM SIZE: 4.5 CM
LEFT ATRIUM VOLUME INDEX MOD: 34.3 ML/M2
LEFT ATRIUM VOLUME MOD: 81.3 CM3
LEFT INTERNAL DIMENSION IN SYSTOLE: 3.58 CM (ref 2.1–4)
LEFT VENTRICLE DIASTOLIC VOLUME INDEX: 63.71 ML/M2
LEFT VENTRICLE DIASTOLIC VOLUME: 151 ML
LEFT VENTRICLE MASS INDEX: 114 G/M2
LEFT VENTRICLE SYSTOLIC VOLUME INDEX: 22.7 ML/M2
LEFT VENTRICLE SYSTOLIC VOLUME: 53.7 ML
LEFT VENTRICULAR INTERNAL DIMENSION IN DIASTOLE: 5.56 CM (ref 3.5–6)
LEFT VENTRICULAR MASS: 270.93 G
LV LATERAL E/E' RATIO: 7.92 M/S
LV SEPTAL E/E' RATIO: 19 M/S
LVOT MG: 3 MMHG
LVOT MV: 0.8 CM/S
MV MEAN GRADIENT: 2 MMHG
MV PEAK A VEL: 0.8 M/S
MV PEAK E VEL: 0.95 M/S
MV PEAK GRADIENT: 3 MMHG
MV VALVE AREA BY CONTINUITY EQUATION: 3.86 CM2
PISA TR MAX VEL: 2.65 M/S
PV MV: 0.64 M/S
PV PEAK GRADIENT: 4 MMHG
PV PEAK VELOCITY: 0.94 M/S
RA PRESSURE ESTIMATED: 3 MMHG
RIGHT VENTRICULAR END-DIASTOLIC DIMENSION: 2.71 CM
RV TB RVSP: 6 MMHG
RV TISSUE DOPPLER FREE WALL SYSTOLIC VELOCITY 1 (APICAL 4 CHAMBER VIEW): 11.6 CM/S
TDI LATERAL: 0.12 M/S
TDI SEPTAL: 0.05 M/S
TDI: 0.09 M/S
TR MAX PG: 28 MMHG
TRICUSPID ANNULAR PLANE SYSTOLIC EXCURSION: 1.65 CM
TV REST PULMONARY ARTERY PRESSURE: 31 MMHG
Z-SCORE OF LEFT VENTRICULAR DIMENSION IN END DIASTOLE: -6.11
Z-SCORE OF LEFT VENTRICULAR DIMENSION IN END SYSTOLE: -4.28

## 2023-12-27 DIAGNOSIS — Z87.891 FORMER SMOKER: ICD-10-CM

## 2023-12-27 DIAGNOSIS — J61 ASBESTOSIS: ICD-10-CM

## 2023-12-27 DIAGNOSIS — R05.3 CHRONIC COUGH: Primary | ICD-10-CM

## 2023-12-29 ENCOUNTER — HOSPITAL ENCOUNTER (OUTPATIENT)
Dept: RADIOLOGY | Facility: HOSPITAL | Age: 68
Discharge: HOME | End: 2023-12-29
Attending: NURSE PRACTITIONER
Payer: MEDICARE

## 2023-12-29 DIAGNOSIS — R05.3 CHRONIC COUGH: ICD-10-CM

## 2023-12-29 DIAGNOSIS — J61 ASBESTOSIS: ICD-10-CM

## 2023-12-29 DIAGNOSIS — Z87.891 FORMER SMOKER: ICD-10-CM

## 2023-12-29 PROCEDURE — 71046 X-RAY EXAM CHEST 2 VIEWS: CPT | Mod: TC,PO

## 2024-01-09 ENCOUNTER — OFFICE VISIT (OUTPATIENT)
Dept: ORTHOPEDICS | Facility: CLINIC | Age: 69
End: 2024-01-09
Payer: MEDICARE

## 2024-01-09 VITALS — WEIGHT: 240.75 LBS | BODY MASS INDEX: 29.93 KG/M2 | RESPIRATION RATE: 16 BRPM | HEIGHT: 75 IN

## 2024-01-09 DIAGNOSIS — G89.29 CHRONIC PAIN OF LEFT KNEE: ICD-10-CM

## 2024-01-09 DIAGNOSIS — M17.12 PRIMARY OSTEOARTHRITIS OF LEFT KNEE: Primary | ICD-10-CM

## 2024-01-09 DIAGNOSIS — M25.562 CHRONIC PAIN OF LEFT KNEE: ICD-10-CM

## 2024-01-09 PROCEDURE — 1159F MED LIST DOCD IN RCRD: CPT | Mod: CPTII,S$GLB,, | Performed by: FAMILY MEDICINE

## 2024-01-09 PROCEDURE — 1101F PT FALLS ASSESS-DOCD LE1/YR: CPT | Mod: CPTII,S$GLB,, | Performed by: FAMILY MEDICINE

## 2024-01-09 PROCEDURE — 99999 PR PBB SHADOW E&M-EST. PATIENT-LVL III: CPT | Mod: PBBFAC,,, | Performed by: FAMILY MEDICINE

## 2024-01-09 PROCEDURE — 99214 OFFICE O/P EST MOD 30 MIN: CPT | Mod: 25,S$GLB,, | Performed by: FAMILY MEDICINE

## 2024-01-09 PROCEDURE — 3288F FALL RISK ASSESSMENT DOCD: CPT | Mod: CPTII,S$GLB,, | Performed by: FAMILY MEDICINE

## 2024-01-09 PROCEDURE — 3008F BODY MASS INDEX DOCD: CPT | Mod: CPTII,S$GLB,, | Performed by: FAMILY MEDICINE

## 2024-01-09 PROCEDURE — 1125F AMNT PAIN NOTED PAIN PRSNT: CPT | Mod: CPTII,S$GLB,, | Performed by: FAMILY MEDICINE

## 2024-01-09 PROCEDURE — 20610 DRAIN/INJ JOINT/BURSA W/O US: CPT | Mod: LT,S$GLB,, | Performed by: FAMILY MEDICINE

## 2024-01-09 NOTE — PROGRESS NOTES
Subjective:     Patient ID: Willi Diaz is a 68 y.o. male.    Chief Complaint: Pain of the Left Knee    Patient returns today with left knee pain secondary to osteoarthritis.  She had a cortisone injection in September but notes that it wore off about late December.  He has been authorized for the Orthovisc series and would like to begin that today.    Pain  Pertinent negatives include no chest pain, chills, congestion, coughing, headaches, rash or sore throat.       Review of Systems   Constitutional: Negative for chills and decreased appetite.   HENT:  Negative for congestion and sore throat.    Eyes:  Negative for blurred vision.   Cardiovascular:  Negative for chest pain, dyspnea on exertion and palpitations.   Respiratory:  Negative for cough and shortness of breath.    Skin:  Negative for rash.   Neurological:  Negative for difficulty with concentration, disturbances in coordination and headaches.   Psychiatric/Behavioral:  Negative for altered mental status, depression, hallucinations, memory loss and suicidal ideas.        Objective:       General    Nursing note and vitals reviewed.  Constitutional: He is oriented to person, place, and time. He appears well-developed and well-nourished.   HENT:   Nose: Nose normal.   Eyes: EOM are normal. Pupils are equal, round, and reactive to light.   Neck: Neck supple.   Cardiovascular:  Normal rate.            Pulmonary/Chest: Effort normal.   Abdominal: Soft.   Neurological: He is alert and oriented to person, place, and time. He has normal reflexes.   Psychiatric: He has a normal mood and affect. His behavior is normal. Judgment and thought content normal.           Right Knee Exam   Right knee exam is normal.    Inspection   Effusion: absent    Range of Motion   Extension:  50   Flexion:  140     Tests   Meniscus   Salma:  Medial - negative Lateral - negative  Ligament Examination   Lachman: normal (-1 to 2mm)   PCL-Posterior Drawer: normal (0 to 2mm)      MCL - Valgus: normal (0 to 2mm)  LCL - Varus: normal  Patella   Patellar apprehension: negative  Passive Patellar Tilt: neutral  Patellar Tracking: normal    Other   Popliteal (Baker's) Cyst: absent  Sensation: normal    Left Knee Exam     Inspection   Effusion: present    Tenderness   The patient tender to palpation of the medial joint line.    Crepitus   The patient has crepitus of the patella and medial joint line.    Range of Motion   Extension:  50   Flexion:  140     Tests   Meniscus   Salma:  Medial - negative Lateral - negative  Stability   Lachman: normal (-1 to 2mm)   PCL-Posterior Drawer: normal (0 to 2mm)  MCL - Valgus: normal (0 to 2mm)  LCL - Varus: normal (0 to 2mm)  Patella   Patellar apprehension: negative  Passive Patellar Tilt: neutral  Patellar Tracking: normal    Other   Popliteal (Baker's) Cyst: absent  Sensation: normal    Muscle Strength   Right Lower Extremity   Quadriceps:  5/5   Hamstrin/5   Left Lower Extremity   Quadriceps:  5/5   Hamstrin/5     Vascular Exam     Right Pulses  Dorsalis Pedis:      2+          Left Pulses  Dorsalis Pedis:      2+          Physical Exam  Vitals and nursing note reviewed.   Constitutional:       Appearance: He is well-developed and well-nourished.   HENT:      Nose: Nose normal.   Eyes:      Extraocular Movements: EOM normal.      Pupils: Pupils are equal, round, and reactive to light.   Cardiovascular:      Rate and Rhythm: Normal rate.      Pulses:           Dorsalis pedis pulses are 2+ on the right side and 2+ on the left side.   Pulmonary:      Effort: Pulmonary effort is normal.   Abdominal:      Palpations: Abdomen is soft.   Musculoskeletal:      Cervical back: Neck supple.      Right knee: No effusion.      Instability Tests: Medial Salma test negative and lateral Salma test negative.      Left knee: Effusion present.      Instability Tests: Medial Salma test negative and lateral Salma test negative.   Neurological:       Mental Status: He is alert and oriented to person, place, and time.      Deep Tendon Reflexes: Reflexes are normal and symmetric.   Psychiatric:         Mood and Affect: Mood and affect normal.         Behavior: Behavior normal.         Thought Content: Thought content normal.         Judgment: Judgment normal.       Assessment:     Encounter Diagnoses   Name Primary?    Primary osteoarthritis of left knee Yes    Chronic pain of left knee        Plan:      Willi was seen today for pain.    Diagnoses and all orders for this visit:    Primary osteoarthritis of left knee    Chronic pain of left knee    First injection of Orthovisc given today.  Will have him follow up with the next two weeks for the remaining injections.    Large Joint Aspiration/Injection: L knee    Date/Time: 1/9/2024 8:40 AM    Performed by: Pillo Olmos MD  Authorized by: Pillo Olmos MD    Consent Done?:  Yes (Verbal)  Indications:  Arthritis and pain  Site marked: the procedure site was marked    Timeout: prior to procedure the correct patient, procedure, and site was verified    Prep: patient was prepped and draped in usual sterile fashion      Local anesthesia used?: Yes    Local anesthetic:  Lidocaine 1% without epinephrine  Anesthetic total (ml):  2      Details:  Needle Size:  22 G  Ultrasonic Guidance for needle placement?: No    Approach:  Anterolateral  Location:  Knee  Site:  L knee  Medications:  30 mg sodium hyaluronate (orthovisc) 30 mg/2 mL  Patient tolerance:  Patient tolerated the procedure well with no immediate complications

## 2024-01-11 ENCOUNTER — OFFICE VISIT (OUTPATIENT)
Dept: PULMONOLOGY | Facility: CLINIC | Age: 69
End: 2024-01-11
Payer: MEDICARE

## 2024-01-11 VITALS
DIASTOLIC BLOOD PRESSURE: 90 MMHG | SYSTOLIC BLOOD PRESSURE: 160 MMHG | WEIGHT: 240 LBS | OXYGEN SATURATION: 98 % | BODY MASS INDEX: 29.84 KG/M2 | HEART RATE: 70 BPM | HEIGHT: 75 IN

## 2024-01-11 DIAGNOSIS — R06.09 DYSPNEA ON EXERTION: Primary | ICD-10-CM

## 2024-01-11 PROCEDURE — 99203 OFFICE O/P NEW LOW 30 MIN: CPT | Mod: S$GLB,,, | Performed by: NURSE PRACTITIONER

## 2024-01-11 PROCEDURE — 3077F SYST BP >= 140 MM HG: CPT | Mod: CPTII,S$GLB,, | Performed by: NURSE PRACTITIONER

## 2024-01-11 PROCEDURE — 1159F MED LIST DOCD IN RCRD: CPT | Mod: CPTII,S$GLB,, | Performed by: NURSE PRACTITIONER

## 2024-01-11 PROCEDURE — 3080F DIAST BP >= 90 MM HG: CPT | Mod: CPTII,S$GLB,, | Performed by: NURSE PRACTITIONER

## 2024-01-11 PROCEDURE — 1126F AMNT PAIN NOTED NONE PRSNT: CPT | Mod: CPTII,S$GLB,, | Performed by: NURSE PRACTITIONER

## 2024-01-11 PROCEDURE — 3008F BODY MASS INDEX DOCD: CPT | Mod: CPTII,S$GLB,, | Performed by: NURSE PRACTITIONER

## 2024-01-11 NOTE — PROGRESS NOTES
SUBJECTIVE:    Patient ID: Willi Diaz is a 68 y.o. male.    Chief Complaint: Establish Care    HPI  Patient here today feeling well.  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  He does have some shortness of breath with moderate exertion such as cutting grass.  He has a history of atrial fib, had a watchman placed.  He denies chest pain or palpitations. He does not cough normally. He states he has had people tell him that he was wheezing before. He quit smoking 24 years ago, has a 20 pack year history. He had a recent chest xray with no acute abnormalities.   Past Medical History:   Diagnosis Date    Anemia due to chronic blood loss 10/16/2022    Anemia, unspecified 10/16/2022    Anticoagulant long-term use     Arthritis     Diabetes mellitus     type 2    Encounter for blood transfusion     GI bleed     Hypertension     Iron deficiency anemia 10/16/2022    Normochromic normocytic anemia 10/16/2022    Paroxysmal atrial fibrillation 2022     Past Surgical History:   Procedure Laterality Date    COLONOSCOPY N/A 09/21/2022    Procedure: COLONOSCOPY;  Surgeon: Naseem Suh MD;  Location: [facility];  Service: Endoscopy;  Laterality: N/A;    ESOPHAGOGASTRODUODENOSCOPY N/A 07/27/2022    Procedure: EGD (ESOPHAGOGASTRODUODENOSCOPY);  Surgeon: Naseem Suh MD;  Location: [facility];  Service: Endoscopy;  Laterality: N/A;    ESOPHAGOGASTRODUODENOSCOPY N/A 09/21/2022    Procedure: EGD (ESOPHAGOGASTRODUODENOSCOPY);  Surgeon: Naseem Suh MD;  Location: [facility];  Service: Endoscopy;  Laterality: N/A;    watchman       Family History   Problem Relation Age of Onset    Aneurysm Mother     No Known Problems Father         Social History:   Marital Status: Significant Other  Occupation: retired    Alcohol History:  reports current alcohol use of about 30.0 standard drinks of alcohol per week.  Tobacco History:  reports that he has quit smoking.  His smoking use included cigarettes. He has never used smokeless tobacco. Quit smoking 24 years ago. 20 pack year history   Drug History:  reports no history of drug use.    Review of patient's allergies indicates:   Allergen Reactions    Ace inhibitors Other (See Comments) and Swelling    Pcn [penicillins] Itching and Swelling       Current Outpatient Medications   Medication Sig Dispense Refill    allopurinoL (ZYLOPRIM) 300 MG tablet Take 300 mg by mouth once daily.      colchicine (COLCRYS) 0.6 mg tablet Take by mouth.      gabapentin (NEURONTIN) 600 MG tablet Take 600 mg by mouth 3 (three) times daily.      iron-vitamin C 100-250 mg, ICAR-C, (ICAR-C) 100-250 mg Tab Take 1 tablet by mouth once daily. 30 each 6    losartan-hydrochlorothiazide 50-12.5 mg (HYZAAR) 50-12.5 mg per tablet Take 1 tablet by mouth once daily. BID      lovastatin (MEVACOR) 20 MG tablet 1 tablet with the evening meal      meloxicam (MOBIC) 7.5 MG tablet meloxicam 7.5 mg tablet      metFORMIN (GLUCOPHAGE) 1000 MG tablet metformin 1,000 mg tablet      multivitamin-iron-folic acid (CENTRUM COMPLETE) Tab 1 tablet      omega 3-dha-epa-fish oil 1,200 (144-216) mg Cap 1 capsule      pantoprazole (PROTONIX) 40 MG tablet Take 1 tablet (40 mg total) by mouth 2 (two) times daily. 60 tablet 11    baclofen (LIORESAL) 10 MG tablet baclofen 10 mg tablet   TAKE 1 TABLET 3 TIMES A DAY BY ORAL ROUTE AS NEEDED.      benzonatate (TESSALON) 100 MG capsule benzonatate 100 mg capsule      cetirizine (ZYRTEC) 10 MG tablet cetirizine 10 mg tablet   TAKE 1 TABLET BY MOUTH EVERY DAY      diltiaZEM (CARDIZEM CD) 180 MG 24 hr capsule Take 180 mg by mouth once daily.      ELIQUIS 5 mg Tab Take 5 mg by mouth 2 (two) times daily.      molnupiravir 200 mg capsule (EUA) molnupiravir 200 mg capsule (EUA)   TAKE 4 CAPSULES BY MOUTH EVERY 12 HOURS FOR 5 DAYS      omeprazole (PRILOSEC) 20 MG capsule omeprazole 20 mg capsule,delayed release      promethazine-dextromethorphan  "(PROMETHAZINE-DM) 6.25-15 mg/5 mL Syrp promethazine-DM 6.25 mg-15 mg/5 mL oral syrup       No current facility-administered medications for this visit.           Review of Systems  General: Feeling Well.  Eyes: Vision is good.  ENT:  some congestion   Heart:: No chest pain or palpitations.  Lungs:occasional shortness of breath moderate exertion, denies cough regularly  GI: No Nausea, vomiting, constipation, diarrhea, or reflux.  : No dysuria, hesitancy, or nocturia.  Musculoskeletal: No joint pain or myalgias.  Skin: No lesions or rashes.  Neuro: No headaches or neuropathy.  Lymph: No edema or adenopathy.  Psych: No anxiety or depression.  Endo: No weight change.    OBJECTIVE:      BP (!) 160/90 (BP Location: Right arm, Patient Position: Sitting, BP Method: Medium (Manual))   Pulse 70   Ht 6' 3" (1.905 m)   Wt 108.9 kg (240 lb)   SpO2 98%   BMI 30.00 kg/m²     Physical Exam  GENERAL: Older patient in no distress.  HEENT: Pupils equal and reactive. Extraocular movements intact. Nose intact.      Pharynx moist.  NECK: Supple.   HEART: Regular rate and rhythm. No murmur or gallop auscultated.  LUNGS: Clear to auscultation and percussion. Lung excursion symmetrical. No change in fremitus. No adventitial noises.  ABDOMEN: Bowel sounds present. Non-tender, no masses palpated.  EXTREMITIES: Normal muscle tone and joint movement, no cyanosis or clubbing.   LYMPHATICS: No adenopathy palpated, no edema.  SKIN: Dry, intact, no lesions.   NEURO: Cranial nerves II-XII intact. Motor strength 5/5 bilaterally, upper and lower extremities.  PSYCH: Appropriate affect.    Assessment:       1. Dyspnea on exertion          Plan:       Dyspnea on exertion  -     Complete PFT with bronchodilator; Future  -     Stress test, pulmonary; Future       PFT  6 minute walk    Follow up if symptoms worsen or fail to improve.          "

## 2024-01-17 ENCOUNTER — OFFICE VISIT (OUTPATIENT)
Dept: ORTHOPEDICS | Facility: CLINIC | Age: 69
End: 2024-01-17
Payer: MEDICARE

## 2024-01-17 VITALS — BODY MASS INDEX: 29.85 KG/M2 | HEIGHT: 75 IN | WEIGHT: 240.06 LBS

## 2024-01-17 DIAGNOSIS — M17.12 PRIMARY OSTEOARTHRITIS OF LEFT KNEE: Primary | ICD-10-CM

## 2024-01-17 DIAGNOSIS — M25.562 CHRONIC PAIN OF LEFT KNEE: ICD-10-CM

## 2024-01-17 DIAGNOSIS — G89.29 CHRONIC PAIN OF LEFT KNEE: ICD-10-CM

## 2024-01-17 PROCEDURE — 99499 UNLISTED E&M SERVICE: CPT | Mod: S$GLB,,, | Performed by: FAMILY MEDICINE

## 2024-01-17 PROCEDURE — 99999 PR PBB SHADOW E&M-EST. PATIENT-LVL III: CPT | Mod: PBBFAC,,, | Performed by: FAMILY MEDICINE

## 2024-01-17 PROCEDURE — 20610 DRAIN/INJ JOINT/BURSA W/O US: CPT | Mod: LT,S$GLB,, | Performed by: FAMILY MEDICINE

## 2024-01-17 NOTE — PROGRESS NOTES
Here for Orthovisc #2/3 on left knee.     Large Joint Aspiration/Injection: L knee    Date/Time: 1/17/2024 8:40 AM    Performed by: Pillo Olmos MD  Authorized by: Pillo Olmos MD    Consent Done?:  Yes (Verbal)  Indications:  Arthritis and pain  Site marked: the procedure site was marked    Timeout: prior to procedure the correct patient, procedure, and site was verified    Prep: patient was prepped and draped in usual sterile fashion      Local anesthesia used?: Yes    Local anesthetic:  Topical anesthetic    Details:  Needle Size:  22 G  Ultrasonic Guidance for needle placement?: No    Approach:  Anterolateral  Location:  Knee  Site:  L knee  Medications:  30 mg sodium hyaluronate (orthovisc) 30 mg/2 mL  Patient tolerance:  Patient tolerated the procedure well with no immediate complications

## 2024-01-23 ENCOUNTER — HOSPITAL ENCOUNTER (OUTPATIENT)
Dept: PULMONOLOGY | Facility: HOSPITAL | Age: 69
Discharge: HOME | End: 2024-01-23
Attending: NURSE PRACTITIONER
Payer: MEDICARE

## 2024-01-23 ENCOUNTER — TELEPHONE (OUTPATIENT)
Dept: PULMONOLOGY | Facility: CLINIC | Age: 69
End: 2024-01-23

## 2024-01-23 VITALS — WEIGHT: 240 LBS | BODY MASS INDEX: 29.84 KG/M2 | HEIGHT: 75 IN

## 2024-01-23 DIAGNOSIS — R06.09 DYSPNEA ON EXERTION: ICD-10-CM

## 2024-01-23 DIAGNOSIS — R06.00 DYSPNEA, UNSPECIFIED TYPE: Primary | ICD-10-CM

## 2024-01-23 PROCEDURE — 94727 GAS DIL/WSHOT DETER LNG VOL: CPT

## 2024-01-23 PROCEDURE — 94010 BREATHING CAPACITY TEST: CPT

## 2024-01-23 PROCEDURE — 94618 PULMONARY STRESS TESTING: CPT

## 2024-01-23 PROCEDURE — 94729 DIFFUSING CAPACITY: CPT

## 2024-01-23 NOTE — CARE UPDATE
"   01/23/24 0747   6MW Test   Ordering Provider MICHOACANO Allison   Performing nurse/tech/RT LORENA Rodriguez RRT   Diagnosis Shortness of Breath   Height 6' 3" (1.905 m)   Weight 108.9 kg (240 lb)   BMI (Calculated) 30   Predicted Distance Meters (Calculated) 590.09 meters   Patient Race    6MWT Status completed without stopping   Patient Reported No complaints   Was O2 used? No   6MW Distance walked (feet) 1200 feet   Distance walked (meters) 365.76 meters   Did patient stop? No   Type of assistive device(s) used? no assistive devices   Is extra documentation required for this patient? Yes   Pre-Exercise   Oxygen Saturation 96 %   Supplemental Oxygen Room Air   Heart Rate 80 bpm   Anne Marie Dyspnea Rating  nothing at all   Exercise 1 Minute   Oxygen Saturation 94 %   Supplemental Oxygen Room Air   Heart Rate 94 bpm   Exercise 2 Minutes   Oxygen Saturation 93 %   Supplemental Oxygen Room Air   Heart Rate 96 bpm   Exercise 3 Minutes   Oxygen Saturation 95 %   Supplemental Oxygen Room Air   Heart Rate 101 bpm   Exercise 4 Minutes   Oxygen Saturation 95 %   Supplemental Oxygen Room Air   Heart Rate 101 bpm   Exercise 5 Minutes   Oxygen Saturation 95 %   Supplemental Oxygen Room Air   Heart Rate 102 bpm   Post Exercise   Oxygen Saturation 95 %   Heart Rate 102 bpm   Anne Marie Dyspnea Rating  nothing at all   Recovery   Oxygen Saturation 97 %   Supplemental Oxygen Room Air   Heart Rate 88 bpm   Anne Marie Dyspnea Rating  nothing at all   Interpretation   Is procedure ready for interpretation? Yes   Did the patient stop or pause? No   Total Laps Walked 1200   Final Partial Lap Distance (feet) 0 feet   Total Distance Feet (Calculated) 824200 feet   Total Distance Meters (Calculated) 95926 meters   Percentage of Predicted (Calculated) 53132.75   Peak VO2 (Calculated) 2198.54   Mets 628.15   Has The Patient Had a Previous Six Minute Walk Test? No   Comments This is a non-hypoxemic six minute walk.  This pt does not qualify for " home oxygen.   Oxygen Qualification   Oxygen Qualification? No

## 2024-01-23 NOTE — TELEPHONE ENCOUNTER
PFT shows no obstruction, mild restriction, moderate diffusion defect.     6 minute walk is non hypoxemic

## 2024-01-23 NOTE — CARE UPDATE
"   01/23/24 0747   6MW Test   Ordering Provider MICHOACANO Allison   Performing nurse/tech/RT LORENA Rodriguez RRT   Diagnosis Shortness of Breath   Height 6' 3" (1.905 m)   Weight 108.9 kg (240 lb)   BMI (Calculated) 30   Predicted Distance Meters (Calculated) 590.09 meters   Patient Race    6MWT Status completed without stopping   Patient Reported No complaints   Was O2 used? No   6MW Distance walked (feet) 1200 feet   Distance walked (meters) 365.76 meters   Did patient stop? No   Type of assistive device(s) used? no assistive devices   Is extra documentation required for this patient? Yes   Pre-Exercise   Oxygen Saturation 96 %   Supplemental Oxygen Room Air   Heart Rate 80 bpm   Anne Marie Dyspnea Rating  nothing at all   Exercise 1 Minute   Oxygen Saturation 94 %   Supplemental Oxygen Room Air   Heart Rate 94 bpm   Exercise 2 Minutes   Oxygen Saturation 93 %   Supplemental Oxygen Room Air   Heart Rate 96 bpm   Exercise 3 Minutes   Oxygen Saturation 95 %   Supplemental Oxygen Room Air   Heart Rate 101 bpm   Exercise 4 Minutes   Oxygen Saturation 95 %   Supplemental Oxygen Room Air   Heart Rate 101 bpm   Exercise 5 Minutes   Oxygen Saturation 95 %   Supplemental Oxygen Room Air   Heart Rate 102 bpm   Post Exercise   Oxygen Saturation 95 %   Heart Rate 102 bpm   Anne Marie Dyspnea Rating  nothing at all   Recovery   Oxygen Saturation 97 %   Supplemental Oxygen Room Air   Heart Rate 88 bpm   Anne Marie Dyspnea Rating  nothing at all   Interpretation   Is procedure ready for interpretation? Yes   Did the patient stop or pause? No   Total Time Walked (Calculated) 360 seconds   Total Laps Walked 6   Final Partial Lap Distance (feet) 0 feet   Total Distance Feet (Calculated) 1200 feet   Total Distance Meters (Calculated) 365.76 meters   Percentage of Predicted (Calculated) 61.98   Peak VO2 (Calculated) 14.95   Mets 4.27   Has The Patient Had a Previous Six Minute Walk Test? No   Comments This is a non-hypoxemic six minute " walk.  This pt does not qualify for home oxygen.   Oxygen Qualification   Oxygen Qualification? No

## 2024-01-24 ENCOUNTER — OFFICE VISIT (OUTPATIENT)
Dept: ORTHOPEDICS | Facility: CLINIC | Age: 69
End: 2024-01-24
Payer: MEDICARE

## 2024-01-24 VITALS — HEIGHT: 75 IN | BODY MASS INDEX: 29.85 KG/M2 | WEIGHT: 240.06 LBS

## 2024-01-24 DIAGNOSIS — M17.12 PRIMARY OSTEOARTHRITIS OF LEFT KNEE: Primary | ICD-10-CM

## 2024-01-24 DIAGNOSIS — G89.29 CHRONIC PAIN OF LEFT KNEE: ICD-10-CM

## 2024-01-24 DIAGNOSIS — M25.562 CHRONIC PAIN OF LEFT KNEE: ICD-10-CM

## 2024-01-24 PROCEDURE — 20610 DRAIN/INJ JOINT/BURSA W/O US: CPT | Mod: LT,S$GLB,, | Performed by: FAMILY MEDICINE

## 2024-01-24 PROCEDURE — 99999 PR PBB SHADOW E&M-EST. PATIENT-LVL III: CPT | Mod: PBBFAC,,, | Performed by: FAMILY MEDICINE

## 2024-01-24 PROCEDURE — 99499 UNLISTED E&M SERVICE: CPT | Mod: S$GLB,,, | Performed by: FAMILY MEDICINE

## 2024-01-24 RX ORDER — METHYLPREDNISOLONE ACETATE 80 MG/ML
80 INJECTION, SUSPENSION INTRA-ARTICULAR; INTRALESIONAL; INTRAMUSCULAR; SOFT TISSUE
Status: DISCONTINUED | OUTPATIENT
Start: 2024-01-24 | End: 2024-01-24 | Stop reason: HOSPADM

## 2024-01-24 RX ADMIN — METHYLPREDNISOLONE ACETATE 80 MG: 80 INJECTION, SUSPENSION INTRA-ARTICULAR; INTRALESIONAL; INTRAMUSCULAR; SOFT TISSUE at 09:01

## 2024-01-25 NOTE — TELEPHONE ENCOUNTER
Message left informing patient will order a CT to see if anything chest xray no showing to cause dyspnea as well as mild restriction. He has history of anemia, dont see where he had the labs that were ordered last year for hematology. Needs to have cbc

## 2024-02-09 ENCOUNTER — HOSPITAL ENCOUNTER (OUTPATIENT)
Dept: RADIOLOGY | Facility: HOSPITAL | Age: 69
Discharge: HOME | End: 2024-02-09
Attending: NURSE PRACTITIONER
Payer: MEDICARE

## 2024-02-09 DIAGNOSIS — R06.00 DYSPNEA, UNSPECIFIED TYPE: ICD-10-CM

## 2024-02-09 PROCEDURE — 71250 CT THORAX DX C-: CPT | Mod: TC,PO

## 2024-02-15 ENCOUNTER — TELEPHONE (OUTPATIENT)
Dept: PULMONOLOGY | Facility: CLINIC | Age: 69
End: 2024-02-15

## 2024-02-15 NOTE — TELEPHONE ENCOUNTER
Patient aware of CT results.  He needs to have further testing of his thyroid. He has an appointment with Dr. Marcelo this month. Will send Ct report to Dr. Marcelo.

## 2024-02-15 NOTE — TELEPHONE ENCOUNTER
Ct chest   IMPRESSION:  1. No CT evidence of constipation document history of dyspnea.  2. Spherical 5.0 cm cyst arising from the anterior interpolar region of the left kidney. Lateral  3. Metallic stent along the aortic valve plane.  4. 1.3 cm nodule localized to the inferior right thyroid lobe prompting need for further workup.

## 2024-02-23 ENCOUNTER — OFFICE VISIT (OUTPATIENT)
Dept: ORTHOPEDICS | Facility: CLINIC | Age: 69
End: 2024-02-23
Payer: MEDICARE

## 2024-02-23 VITALS — WEIGHT: 240.06 LBS | BODY MASS INDEX: 29.85 KG/M2 | HEIGHT: 75 IN

## 2024-02-23 DIAGNOSIS — M17.12 PRIMARY OSTEOARTHRITIS OF LEFT KNEE: Primary | ICD-10-CM

## 2024-02-23 DIAGNOSIS — M25.562 CHRONIC PAIN OF LEFT KNEE: ICD-10-CM

## 2024-02-23 DIAGNOSIS — G89.29 CHRONIC PAIN OF LEFT KNEE: ICD-10-CM

## 2024-02-23 PROCEDURE — 99999 PR PBB SHADOW E&M-EST. PATIENT-LVL III: CPT | Mod: PBBFAC,,, | Performed by: FAMILY MEDICINE

## 2024-02-23 PROCEDURE — 1125F AMNT PAIN NOTED PAIN PRSNT: CPT | Mod: CPTII,S$GLB,, | Performed by: FAMILY MEDICINE

## 2024-02-23 PROCEDURE — 20610 DRAIN/INJ JOINT/BURSA W/O US: CPT | Mod: LT,S$GLB,, | Performed by: FAMILY MEDICINE

## 2024-02-23 PROCEDURE — 3008F BODY MASS INDEX DOCD: CPT | Mod: CPTII,S$GLB,, | Performed by: FAMILY MEDICINE

## 2024-02-23 PROCEDURE — 1159F MED LIST DOCD IN RCRD: CPT | Mod: CPTII,S$GLB,, | Performed by: FAMILY MEDICINE

## 2024-02-23 PROCEDURE — 99214 OFFICE O/P EST MOD 30 MIN: CPT | Mod: 25,S$GLB,, | Performed by: FAMILY MEDICINE

## 2024-02-23 PROCEDURE — 3288F FALL RISK ASSESSMENT DOCD: CPT | Mod: CPTII,S$GLB,, | Performed by: FAMILY MEDICINE

## 2024-02-23 PROCEDURE — 1101F PT FALLS ASSESS-DOCD LE1/YR: CPT | Mod: CPTII,S$GLB,, | Performed by: FAMILY MEDICINE

## 2024-02-23 RX ORDER — METHYLPREDNISOLONE ACETATE 80 MG/ML
80 INJECTION, SUSPENSION INTRA-ARTICULAR; INTRALESIONAL; INTRAMUSCULAR; SOFT TISSUE
Status: DISCONTINUED | OUTPATIENT
Start: 2024-02-23 | End: 2024-02-23 | Stop reason: HOSPADM

## 2024-02-23 RX ADMIN — METHYLPREDNISOLONE ACETATE 80 MG: 80 INJECTION, SUSPENSION INTRA-ARTICULAR; INTRALESIONAL; INTRAMUSCULAR; SOFT TISSUE at 08:02

## 2024-02-23 NOTE — PROGRESS NOTES
Subjective:     Patient ID: Willi Diaz is a 68 y.o. male.    Chief Complaint: Pain of the Left Knee    Patient returns today for follow up of left knee pain secondary to osteoarthritis.  He reports he had no additional pain relief from the Orthovisc series.  He did have some good pain relief from the steroid injection he received in September.  He has had multiple steroid injections in his knees with fairly good results.  He does state though that even with steroid injections he has not able to bend down to get in his boat which is one of his favorite hobbies.  He is looking for more long-term solution for pain relief.    Pain  Pertinent negatives include no chest pain, chills, congestion, coughing, headaches, rash or sore throat.       Review of Systems   Constitutional: Negative for chills and decreased appetite.   HENT:  Negative for congestion and sore throat.    Eyes:  Negative for blurred vision.   Cardiovascular:  Negative for chest pain, dyspnea on exertion and palpitations.   Respiratory:  Negative for cough and shortness of breath.    Skin:  Negative for rash.   Neurological:  Negative for difficulty with concentration, disturbances in coordination and headaches.   Psychiatric/Behavioral:  Negative for altered mental status, depression, hallucinations, memory loss and suicidal ideas.        Objective:       General    Nursing note and vitals reviewed.  Constitutional: He is oriented to person, place, and time. He appears well-developed and well-nourished.   HENT:   Nose: Nose normal.   Eyes: EOM are normal. Pupils are equal, round, and reactive to light.   Neck: Neck supple.   Cardiovascular:  Normal rate.            Pulmonary/Chest: Effort normal.   Abdominal: Soft.   Neurological: He is alert and oriented to person, place, and time. He has normal reflexes.   Psychiatric: He has a normal mood and affect. His behavior is normal. Judgment and thought content normal.           Right Knee Exam   Right  knee exam is normal.    Inspection   Effusion: absent    Range of Motion   Extension:  50   Flexion:  140     Tests   Meniscus   Salma:  Medial - negative Lateral - negative  Ligament Examination   Lachman: normal (-1 to 2mm)   PCL-Posterior Drawer: normal (0 to 2mm)     MCL - Valgus: normal (0 to 2mm)  LCL - Varus: normal  Patella   Patellar apprehension: negative  Passive Patellar Tilt: neutral  Patellar Tracking: normal    Other   Popliteal (Baker's) Cyst: absent  Sensation: normal    Left Knee Exam     Inspection   Effusion: present    Tenderness   The patient tender to palpation of the medial joint line.    Crepitus   The patient has crepitus of the patella and medial joint line.    Range of Motion   Extension:  50   Flexion:  140     Tests   Meniscus   Salma:  Medial - negative Lateral - negative  Stability   Lachman: normal (-1 to 2mm)   PCL-Posterior Drawer: normal (0 to 2mm)  MCL - Valgus: normal (0 to 2mm)  LCL - Varus: normal (0 to 2mm)  Patella   Patellar apprehension: negative  Passive Patellar Tilt: neutral  Patellar Tracking: normal    Other   Popliteal (Baker's) Cyst: absent  Sensation: normal    Muscle Strength   Right Lower Extremity   Quadriceps:  5/5   Hamstrin/5   Left Lower Extremity   Quadriceps:  5/5   Hamstrin/5     Vascular Exam     Right Pulses  Dorsalis Pedis:      2+          Left Pulses  Dorsalis Pedis:      2+        Physical Exam  Vitals and nursing note reviewed.   Constitutional:       Appearance: He is well-developed and well-nourished.   HENT:      Nose: Nose normal.   Eyes:      Extraocular Movements: EOM normal.      Pupils: Pupils are equal, round, and reactive to light.   Cardiovascular:      Rate and Rhythm: Normal rate.      Pulses:           Dorsalis pedis pulses are 2+ on the right side and 2+ on the left side.   Pulmonary:      Effort: Pulmonary effort is normal.   Abdominal:      Palpations: Abdomen is soft.   Musculoskeletal:      Cervical back: Neck  supple.      Right knee: No effusion.      Instability Tests: Medial Salma test negative and lateral Salma test negative.      Left knee: Effusion present.      Instability Tests: Medial Salma test negative and lateral Salma test negative.   Neurological:      Mental Status: He is alert and oriented to person, place, and time.      Deep Tendon Reflexes: Reflexes are normal and symmetric.   Psychiatric:         Mood and Affect: Mood and affect normal.         Behavior: Behavior normal.         Thought Content: Thought content normal.         Judgment: Judgment normal.     Assessment:     Encounter Diagnoses   Name Primary?    Primary osteoarthritis of left knee Yes    Chronic pain of left knee        Plan:      Willi was seen today for pain.    Diagnoses and all orders for this visit:    Primary osteoarthritis of left knee  -     Iovera; Future    Chronic pain of left knee  -     Iovera; Future      I discussed all treatment options with him today.  His knee is not bad enough to have a knee replacement and he does not desire one at this point.  We will give him another steroid injection today for acute pain relief.  We discussed the possibility of Iovera cryoablation of his genicular nerves was a long term solution for pain relief.  He is interested in this at a later date.  Will have him schedule a follow-up in three months and submit a prior authorization for Iovera.  In three months he still desires the procedure will perform a then    Large Joint Aspiration/Injection: L knee    Date/Time: 2/23/2024 8:40 AM    Performed by: Pillo Olmos MD  Authorized by: Pillo Olmos MD    Consent Done?:  Yes (Verbal)  Indications:  Arthritis and pain  Site marked: the procedure site was marked    Timeout: prior to procedure the correct patient, procedure, and site was verified    Prep: patient was prepped and draped in usual sterile fashion      Local anesthesia used?: Yes    Local anesthetic:  Lidocaine 1%  without epinephrine  Anesthetic total (ml):  2      Details:  Needle Size:  22 G  Ultrasonic Guidance for needle placement?: No    Approach:  Anterolateral  Location:  Knee  Site:  L knee  Medications:  80 mg methylPREDNISolone acetate 80 mg/mL  Patient tolerance:  Patient tolerated the procedure well with no immediate complications

## 2024-05-17 ENCOUNTER — TELEPHONE (OUTPATIENT)
Dept: ORTHOPEDICS | Facility: CLINIC | Age: 69
End: 2024-05-17
Payer: MEDICARE

## 2024-05-17 NOTE — TELEPHONE ENCOUNTER
----- Message from Eliseo Canela MA sent at 5/17/2024 11:38 AM CDT -----  Contact: patient  Patient has some concerns about his procedure next week & would like to speak to office.    Call back number is [phone]

## 2024-08-13 ENCOUNTER — TELEPHONE (OUTPATIENT)
Dept: UROLOGY | Facility: CLINIC | Age: 69
End: 2024-08-13
Payer: MEDICARE

## 2024-08-13 NOTE — TELEPHONE ENCOUNTER
Referral received from Eun Schumacher for ed/bph   Scheduled pt per his availability   Pt past urologist is Dr Mcdonald (records requested)  Appt confirmed pt was informed will need urine upon arrival at visit   Pt vu    [FreeTextEntry1] : 24 y/o F w/ no significant PMHx is here for follow up evaluation of insulin resistance / BMI 46 ( review labs)  #Class III Obesity #Insulin Resistance - BMI 46.67 this visit - Had IUD, taken off, then OCP for contraception, did not feel well on it - Off OCP since 5/2023, periods regular off OCPs - + Hirsutism, denies acne - Reports weight gain (easy to gain, difficult to lose weight) - TFTs WNL, TPO ab -ve (4/2024) - Total testosterone WNL (11/2023) - Insulin level elevated (11/2023), however glucose normal - 6/2024: dex suppression test with adequate response - patient agreeable to try metformin , will start with 500 mg daily and up to BID if tolerated , also discussed berberine use   labs and f/u in 6 months

## 2024-08-25 NOTE — PROGRESS NOTES
"[facility] New Patient/H&P:     Willi Diaz is a 68 y.o. male who presents for bph eval at referral of Eun Schumacher NP (Dr Marcelo)    He had previously seen Dr Jara for urology. Records requested x2 none received  Patient reports he had evaluation by Dr. Sutherland for urinary frequency and was prescribed medication he could not afford it did not take it and just goes to urinate when he needs to urinate  AUA symptom score:  8/4 (3: Urgency; 3: Sleeping; 2: Frequency). Udip neg except 30 prot. PVR 15cc  At time of previous urology evaluation patient reports that his beer consumption was likely contributing to his urgency and frequency, at least 10-15 beers per day at that time, now down to about 4-6 per day  Denies weak stream, straining, intermittency, or any obstructive symptoms.  Bad snoring, never been checked for sleeping   Doesn't stop fluids 2h before bed.   His beer is daytime. Drinks lots of water day and night  DM - "thinks its controlled". Pt doesn't know his A1C. Doesn't check his sugars.    I have only received 1 note from primary care, last visit on 8/8/24 which was a follow-up for ED in which he was referred for his BPH/LUTS and ED after given prescription for Viagra.  Reviewed medication reconciliation with the patient from that visit noting he was on Flomax and finasteride.  He reports it was started weeks prior when he saw her 2 weeks prior discussing his urinary concerns.  No changes in his urinary symptoms and starting either of these medications    Viagra 25 mg Rxed. Pt says no benefit. Never tried ed meds before. Didn't use on empty stomach.    Last psa on file 0.64 in 2019  Pt reports had recent labs done  Called office and verifited none on filte during visit    Dr Marcus - cardiology  When changed pcp, noted afib, started eliqus, had GI bleed  Off eltiqus, did watchmen  Put on plavix until healed from watchmen, and now off it      Past Medical History:   Diagnosis Date    " Anemia due to chronic blood loss 10/16/2022    Anemia, unspecified 10/16/2022    Anticoagulant long-term use     Arthritis     Diabetes mellitus     type 2    Encounter for blood transfusion     GI bleed     Hypertension     Iron deficiency anemia 10/16/2022    Normochromic normocytic anemia 10/16/2022    Paroxysmal atrial fibrillation 2022       Past Surgical History:   Procedure Laterality Date    COLONOSCOPY N/A 09/21/2022    Procedure: COLONOSCOPY;  Surgeon: Naseem Suh MD;  Location: [facility] ENDO;  Service: Endoscopy;  Laterality: N/A;    ESOPHAGOGASTRODUODENOSCOPY N/A 07/27/2022    Procedure: EGD (ESOPHAGOGASTRODUODENOSCOPY);  Surgeon: Naseem Suh MD;  Location: [facility] ENDO;  Service: Endoscopy;  Laterality: N/A;    ESOPHAGOGASTRODUODENOSCOPY N/A 09/21/2022    Procedure: EGD (ESOPHAGOGASTRODUODENOSCOPY);  Surgeon: Naseem Suh MD;  Location: [facility] ENDO;  Service: Endoscopy;  Laterality: N/A;    watchman         Family History   Problem Relation Name Age of Onset    Aneurysm Mother      No Known Problems Father         Social History     Socioeconomic History    Marital status:    Tobacco Use    Smoking status: Former     Types: Cigarettes    Smokeless tobacco: Never    Tobacco comments:     Started in 1975 1- 1.5 pack a day until 1999   Substance and Sexual Activity    Alcohol use: Yes     Alcohol/week: 30.0 standard drinks of alcohol     Types: 30 Cans of beer per week     Comment: daily    Drug use: Never     Social Determinants of Health     Financial Resource Strain: Medium Risk (8/20/2024)    Overall Financial Resource Strain (CARDIA)     Difficulty of Paying Living Expenses: Somewhat hard   Food Insecurity: Food Insecurity Present (8/20/2024)    Hunger Vital Sign     Worried About Running Out of Food in the Last Year: Sometimes true     Ran Out of Food in the Last Year: Never true   Physical Activity: Unknown (8/20/2024)    Exercise Vital Sign     Days of Exercise per Week: 4 days  "  Stress: No Stress Concern Present (8/20/2024)    [employer] - Occupational Stress Questionnaire     Feeling of Stress : Not at all   Housing Stability: Unknown (8/20/2024)    Housing Stability Vital Sign     Unable to Pay for Housing in the Last Year: No       Review of patient's allergies indicates:   Allergen Reactions    Ace inhibitors Other (See Comments) and Swelling    Pcn [penicillins] Itching and Swelling    Statins-hmg-coa reductase inhibitors Other (See Comments)     Throat swelling       Medications Reviewed: see MAR    Focused Physical Exam    Vitals:    08/26/24 0914   BP: (!) 163/86   Pulse: 66     Body mass index is 29.12 kg/m². Weight: 105.7 kg (233 lb) Height: 6' 3" (190.5 cm)       Abdomen: Soft, non-tender, nondistended, no CVA tenderness  : deferred      LABS:    Recent Results (from the past 336 hour(s))   POCT Bladder Scan    Collection Time: 08/26/24  9:26 AM   Result Value Ref Range    POC Residual Urine Volume 15 0 - 100 mL   POCT Urinalysis(Instrument)    Collection Time: 08/26/24  9:27 AM   Result Value Ref Range    Color, POC UA Yellow Yellow, Straw, Colorless    Clarity, POC UA Clear Clear    Glucose, POC UA Negative Negative    Bilirubin, POC UA Negative Negative    Ketones, POC UA Negative Negative    Spec Grav POC UA 1.015 1.005 - 1.030    Blood, POC UA Negative Negative    pH, POC UA 5.5 5.0 - 8.0    Protein, POC UA 30 (A) Negative    Urobilinogen, POC UA 0.2 <=1.0    Nitrite, POC UA Negative Negative    WBC, POC UA Negative Negative         Assessment/Diagnosis:    1. BPH with obstruction/lower urinary tract symptoms  POCT Urinalysis(Instrument)    POCT Bladder Scan      2. Erectile disorder due to medical condition in male        3. Prostate cancer screening  PSA, Screening          Plans:  In regards to his lower urinary tract symptoms, urgency frequency have been predominant in the past.  Has significantly cut down his alcohol intake but still " drinking 4-6 beers per day.  Reviewed this as bladder irritant.  Reviewed conservative recommendations for urgency and frequency.  He feels that his frequency and urgency are manageable at this time, but also has bothersome nocturia.  Does have significant snoring in his never been tested for sleep apnea nor discontinuing fluid 2 hours before bed so all conservative recommendations for urgency and frequency were provided in writing on his after visit summary.  We did review that with no change on Flomax, at this time can discontinue it as he has not had any obstructive symptoms.  While it is possible that his enlarged underlying prostate contributes to the urgency frequency and bladder changes, he also has many other etiologies of urgency and frequency with a significant alcohol intake, his diabetes etcetera.  We discussed the importance of good diabetic control, recommended that he discuss SIDNEY evaluation with primary care, and will discontinue BPH medications at this time.  Especially discontinuing finasteride as it can contribute to worsening erectile dysfunction which is 1 of his primary complaints.  Could consider in the future resuming Flomax or lower tract evaluation.  It does sound like the only medication he was previously tried on by Urology was not overactive bladder medication given the history of urgency frequency and being prescribed medication he can not afford.  Not indicated at this time.      In regards to his erectile dysfunction we did discuss multifactorial etiologies such as underlying medical problems, control of diabetes and blood pressure, medications, effect of alcohol as system depressant and he does drink daily, vascular, psychogenic.  First-line therapy is medical.  He was prescribed a very low dose of sildenafil and has not been utilizing proper use.  We did discuss appropriate dosing and proper use and these instructions were provided in writing.    He is due for screening PSA which  will get today and chart check the results.  Will then set routine six-month follow-up with our nurse practitioner to re-evaluate his LUTS and ED.  Could consider in the future resuming Flomax alone and possible lower tract evaluation before considering OAB agent but if no obstructive symptoms, and symptoms are better controlled, continue to observe and follow conservative recommendations and modifiable risk factors as noted.    Will cc primary care with the above recommendations    Level of Medical Decision Making  [ _ ]  Low: 2 minor/1 stable chronic/1 acute uncomplicated --- review record/result/order test x2  [ X ]  Mod: 1 chronic exac/2stable chronic/1 acute comp --- review record/result/order test x3 OR independent interp of outside test OR discuss mgmt of outside ordered test --- start drug therapy/plan minor surgery   [ _ ]  High: chronic with exacerbation/progression or trtmnt side effect vs acute/chronic w/ life/body threat ---  (2/3) review record/result/order test x3 OR independent interp of ouutside test OR discuss mgmt of outside ordered test --- drug with monitoring for toxicity, plan major surgery, hospitalize, deescalate/withdraw care bc of prognosis     *Visit today included increased complexity associated with the current or anticipated ongoing medical longitudinal care and management related to this patient's serious and/or complex managed problem(s) as described above.

## 2024-08-26 ENCOUNTER — LAB VISIT (OUTPATIENT)
Dept: LAB | Facility: HOSPITAL | Age: 69
End: 2024-08-26
Attending: UROLOGY
Payer: MEDICARE

## 2024-08-26 ENCOUNTER — OFFICE VISIT (OUTPATIENT)
Dept: UROLOGY | Facility: CLINIC | Age: 69
End: 2024-08-26
Payer: MEDICARE

## 2024-08-26 VITALS
SYSTOLIC BLOOD PRESSURE: 163 MMHG | WEIGHT: 233 LBS | HEIGHT: 75 IN | DIASTOLIC BLOOD PRESSURE: 86 MMHG | BODY MASS INDEX: 28.97 KG/M2 | HEART RATE: 66 BPM

## 2024-08-26 DIAGNOSIS — N40.1 BPH WITH OBSTRUCTION/LOWER URINARY TRACT SYMPTOMS: Primary | ICD-10-CM

## 2024-08-26 DIAGNOSIS — Z12.5 PROSTATE CANCER SCREENING: ICD-10-CM

## 2024-08-26 DIAGNOSIS — N52.1 ERECTILE DISORDER DUE TO MEDICAL CONDITION IN MALE: ICD-10-CM

## 2024-08-26 DIAGNOSIS — N13.8 BPH WITH OBSTRUCTION/LOWER URINARY TRACT SYMPTOMS: Primary | ICD-10-CM

## 2024-08-26 LAB
BILIRUBIN, UA POC OHS: NEGATIVE
BLOOD, UA POC OHS: NEGATIVE
CLARITY, UA POC OHS: CLEAR
COLOR, UA POC OHS: YELLOW
COMPLEXED PSA SERPL-MCNC: 1.3 NG/ML (ref 0–4)
GLUCOSE, UA POC OHS: NEGATIVE
KETONES, UA POC OHS: NEGATIVE
LEUKOCYTES, UA POC OHS: NEGATIVE
NITRITE, UA POC OHS: NEGATIVE
PH, UA POC OHS: 5.5
POC RESIDUAL URINE VOLUME: 15 ML (ref 0–100)
PROTEIN, UA POC OHS: 30
SPECIFIC GRAVITY, UA POC OHS: 1.01
UROBILINOGEN, UA POC OHS: 0.2

## 2024-08-26 PROCEDURE — 3008F BODY MASS INDEX DOCD: CPT | Mod: CPTII,S$GLB,, | Performed by: UROLOGY

## 2024-08-26 PROCEDURE — 1159F MED LIST DOCD IN RCRD: CPT | Mod: CPTII,S$GLB,, | Performed by: UROLOGY

## 2024-08-26 PROCEDURE — 1101F PT FALLS ASSESS-DOCD LE1/YR: CPT | Mod: CPTII,S$GLB,, | Performed by: UROLOGY

## 2024-08-26 PROCEDURE — 1126F AMNT PAIN NOTED NONE PRSNT: CPT | Mod: CPTII,S$GLB,, | Performed by: UROLOGY

## 2024-08-26 PROCEDURE — 81003 URINALYSIS AUTO W/O SCOPE: CPT | Mod: QW,S$GLB,, | Performed by: UROLOGY

## 2024-08-26 PROCEDURE — 3077F SYST BP >= 140 MM HG: CPT | Mod: CPTII,S$GLB,, | Performed by: UROLOGY

## 2024-08-26 PROCEDURE — 51798 US URINE CAPACITY MEASURE: CPT | Mod: S$GLB,,, | Performed by: UROLOGY

## 2024-08-26 PROCEDURE — 3079F DIAST BP 80-89 MM HG: CPT | Mod: CPTII,S$GLB,, | Performed by: UROLOGY

## 2024-08-26 PROCEDURE — G2211 COMPLEX E/M VISIT ADD ON: HCPCS | Mod: S$GLB,,, | Performed by: UROLOGY

## 2024-08-26 PROCEDURE — 84153 ASSAY OF PSA TOTAL: CPT | Performed by: UROLOGY

## 2024-08-26 PROCEDURE — 3288F FALL RISK ASSESSMENT DOCD: CPT | Mod: CPTII,S$GLB,, | Performed by: UROLOGY

## 2024-08-26 PROCEDURE — 36415 COLL VENOUS BLD VENIPUNCTURE: CPT | Performed by: UROLOGY

## 2024-08-26 PROCEDURE — 99204 OFFICE O/P NEW MOD 45 MIN: CPT | Mod: S$GLB,,, | Performed by: UROLOGY

## 2024-08-26 PROCEDURE — 99999 PR PBB SHADOW E&M-EST. PATIENT-LVL IV: CPT | Mod: PBBFAC,,, | Performed by: UROLOGY

## 2024-08-26 RX ORDER — SILDENAFIL 25 MG/1
25 TABLET, FILM COATED ORAL DAILY PRN
COMMUNITY
Start: 2024-08-09

## 2024-08-26 RX ORDER — ROSUVASTATIN CALCIUM 10 MG/1
10 TABLET, COATED ORAL NIGHTLY
COMMUNITY
Start: 2024-08-12

## 2024-08-26 RX ORDER — CLOPIDOGREL BISULFATE 75 MG/1
TABLET ORAL
COMMUNITY

## 2024-08-26 RX ORDER — FINASTERIDE 5 MG/1
5 TABLET, FILM COATED ORAL
COMMUNITY
Start: 2024-07-24 | End: 2024-08-26

## 2024-08-26 RX ORDER — TAMSULOSIN HYDROCHLORIDE 0.4 MG/1
1 CAPSULE ORAL
COMMUNITY
Start: 2024-07-24 | End: 2024-08-26

## 2024-08-26 NOTE — PATIENT INSTRUCTIONS
STOP flomax and finasteride  Follow recs for below in addition to   - good control of diabetes  - evaluation for sleep apnea    Discussed conservative measures to control urgency and frequency including   1. Avoiding/minimizing bladder irritants (see below), especially in afternoon and evening hours    Discussed bladder irritants include coffe (even decaf), tea, alcohol, soda, spicy foods, acidic juices (orange, tomato), vinegar, and artificial sweeteners/sugary beverages.    2. timed voiding - empty on a schedule (approx ~2-3 hours) in spite of need to urinate, to get ahead of urge    3. dont postponing voiding - dont hold it on purpose     4. bowel regimen as distended bowel has extrinsic compressive effect on bladder.   - any or all of the following in any combination, titrate to soft daily bowel movement without pushing or straining  - colace/stool softener capsule - once to twice daily  - miralax - 1 capful daily to start, can increase to 2x daily (or decrease to 1/2 cap daily)  - increase dietary fibery  - fiber supplements, such as metamucil  - prunes, prune juice    5. INCREASE water intake    6. Stop fluids 2 hours before bed, and urinate just before bed      Consider in future - restarting flomax then lower trac evaluation    For ed - proper viagra use is:  30-45 minutes in advance of anticipated sexual encounter best absorbed on an empty stomach.  Do not take within 1 hour of meal on either end, but the emptying of the stomach the better efficacy.  Maximum dose is 100 mg.  Can try 50, 75, or 100.  Start with 50 and see how it does before dose escalating.  Do not take more than 100 mg at a time, do not repeat doses within <24 hours

## 2024-08-29 DIAGNOSIS — M25.562 LEFT KNEE PAIN, UNSPECIFIED CHRONICITY: Primary | ICD-10-CM

## 2024-09-05 ENCOUNTER — HOSPITAL ENCOUNTER (OUTPATIENT)
Dept: RADIOLOGY | Facility: HOSPITAL | Age: 69
Discharge: HOME | End: 2024-09-05
Attending: ORTHOPAEDIC SURGERY
Payer: MEDICARE

## 2024-09-05 ENCOUNTER — OFFICE VISIT (OUTPATIENT)
Dept: ORTHOPEDICS | Facility: CLINIC | Age: 69
End: 2024-09-05
Payer: MEDICARE

## 2024-09-05 VITALS — BODY MASS INDEX: 28.97 KG/M2 | WEIGHT: 233 LBS | HEIGHT: 75 IN

## 2024-09-05 DIAGNOSIS — M25.562 LEFT KNEE PAIN, UNSPECIFIED CHRONICITY: Primary | ICD-10-CM

## 2024-09-05 DIAGNOSIS — M25.562 LEFT KNEE PAIN, UNSPECIFIED CHRONICITY: ICD-10-CM

## 2024-09-05 DIAGNOSIS — M17.12 PRIMARY OSTEOARTHRITIS OF LEFT KNEE: ICD-10-CM

## 2024-09-05 PROCEDURE — 73564 X-RAY EXAM KNEE 4 OR MORE: CPT | Mod: TC,PO,LT

## 2024-09-05 PROCEDURE — 73564 X-RAY EXAM KNEE 4 OR MORE: CPT | Mod: 26,LT,, | Performed by: RADIOLOGY

## 2024-09-05 PROCEDURE — 73562 X-RAY EXAM OF KNEE 3: CPT | Mod: 26,59,RT, | Performed by: RADIOLOGY

## 2024-09-05 PROCEDURE — 99999 PR PBB SHADOW E&M-EST. PATIENT-LVL II: CPT | Mod: PBBFAC,,, | Performed by: ORTHOPAEDIC SURGERY

## 2024-09-05 NOTE — PROGRESS NOTES
Past Medical History:   Diagnosis Date    Anemia due to chronic blood loss 10/16/2022    Anemia, unspecified 10/16/2022    Anticoagulant long-term use     Arthritis     Diabetes mellitus     type 2    Encounter for blood transfusion     GI bleed     Hypertension     Iron deficiency anemia 10/16/2022    Normochromic normocytic anemia 10/16/2022    Paroxysmal atrial fibrillation 2022       Past Surgical History:   Procedure Laterality Date    COLONOSCOPY N/A 09/21/2022    Procedure: COLONOSCOPY;  Surgeon: Naseem Suh MD;  Location: [facility] ENDO;  Service: Endoscopy;  Laterality: N/A;    ESOPHAGOGASTRODUODENOSCOPY N/A 07/27/2022    Procedure: EGD (ESOPHAGOGASTRODUODENOSCOPY);  Surgeon: Naseem Suh MD;  Location: [facility] ENDO;  Service: Endoscopy;  Laterality: N/A;    ESOPHAGOGASTRODUODENOSCOPY N/A 09/21/2022    Procedure: EGD (ESOPHAGOGASTRODUODENOSCOPY);  Surgeon: Naseem Suh MD;  Location: [facility] ENDO;  Service: Endoscopy;  Laterality: N/A;    watchman         Current Outpatient Medications   Medication Sig    allopurinoL (ZYLOPRIM) 300 MG tablet Take 300 mg by mouth once daily.    baclofen (LIORESAL) 10 MG tablet baclofen 10 mg tablet   TAKE 1 TABLET 3 TIMES A DAY BY ORAL ROUTE AS NEEDED.    benzonatate (TESSALON) 100 MG capsule benzonatate 100 mg capsule (Patient not taking: Reported on 8/26/2024)    cetirizine (ZYRTEC) 10 MG tablet cetirizine 10 mg tablet   TAKE 1 TABLET BY MOUTH EVERY DAY    clopidogreL (PLAVIX) 75 mg tablet Take by mouth.    colchicine (COLCRYS) 0.6 mg tablet Take by mouth.    diltiaZEM (CARDIZEM CD) 180 MG 24 hr capsule Take 180 mg by mouth once daily.    docosahexaenoic acid/epa (FISH OIL ORAL)     gabapentin (NEURONTIN) 600 MG tablet Take 600 mg by mouth 3 (three) times daily.    iron-vitamin C 100-250 mg, ICAR-C, (ICAR-C) 100-250 mg Tab Take 1 tablet by mouth once daily.    losartan-hydrochlorothiazide 50-12.5 mg (HYZAAR) 50-12.5 mg per tablet Take 1 tablet by mouth once daily. BID     lovastatin (MEVACOR) 20 MG tablet 1 tablet with the evening meal    meloxicam (MOBIC) 7.5 MG tablet meloxicam 7.5 mg tablet    metFORMIN (GLUCOPHAGE) 1000 MG tablet metformin 1,000 mg tablet    molnupiravir 200 mg capsule (EUA) molnupiravir 200 mg capsule (EUA)   TAKE 4 CAPSULES BY MOUTH EVERY 12 HOURS FOR 5 DAYS    multivitamin-iron-folic acid (CENTRUM COMPLETE) Tab 1 tablet    omega 3-dha-epa-fish oil 1,200 (144-216) mg Cap 1 capsule    omeprazole (PRILOSEC) 20 MG capsule omeprazole 20 mg capsule,delayed release    pantoprazole (PROTONIX) 40 MG tablet Take 1 tablet (40 mg total) by mouth 2 (two) times daily.    promethazine-dextromethorphan (PROMETHAZINE-DM) 6.25-15 mg/5 mL Syrp promethazine-DM 6.25 mg-15 mg/5 mL oral syrup    rosuvastatin (CRESTOR) 10 MG tablet Take 10 mg by mouth every evening.    sildenafiL (VIAGRA) 25 MG tablet Take 25 mg by mouth daily as needed.     No current facility-administered medications for this visit.       Review of patient's allergies indicates:   Allergen Reactions    Ace inhibitors Other (See Comments) and Swelling    Pcn [penicillins] Itching and Swelling    Statins-hmg-coa reductase inhibitors Other (See Comments)     Throat swelling       Family History   Problem Relation Name Age of Onset    Aneurysm Mother      No Known Problems Father         Social History     Socioeconomic History    Marital status:    Tobacco Use    Smoking status: Former     Types: Cigarettes    Smokeless tobacco: Never    Tobacco comments:     Started in 1975 1- 1.5 pack a day until 1999   Substance and Sexual Activity    Alcohol use: Yes     Alcohol/week: 30.0 standard drinks of alcohol     Types: 30 Cans of beer per week     Comment: daily    Drug use: Never     Social Determinants of Health     Financial Resource Strain: Medium Risk (8/20/2024)    Overall Financial Resource Strain (CARDIA)     Difficulty of Paying Living Expenses: Somewhat hard   Food Insecurity: Food Insecurity Present  (8/20/2024)    Hunger Vital Sign     Worried About Running Out of Food in the Last Year: Sometimes true     Ran Out of Food in the Last Year: Never true   Physical Activity: Unknown (8/20/2024)    Exercise Vital Sign     Days of Exercise per Week: 4 days   Stress: No Stress Concern Present (8/20/2024)    [employer] - Occupational Stress Questionnaire     Feeling of Stress : Not at all   Housing Stability: Unknown (8/20/2024)    Housing Stability Vital Sign     Unable to Pay for Housing in the Last Year: No       Chief Complaint: No chief complaint on file.      History of present illness:  69-year-old male seen for chronic left knee pain and swelling.  Patient had been seeing Dr. Olmos and had numerous injections.  They no longer helpful.  Pain is a 5/10.  Starting to limit his range of motion inability to more grass.  Patient is interested in fixing this once and for all.        Review of Systems:    Constitution: Negative for chills, fever, and sweats.  Negative for unexplained weight loss.    HENT:  Negative for headaches and blurry vision.    Cardiovascular:Negative for chest pain or irregular heart beat. Negative for hypertension.    Respiratory:  Negative for cough and shortness of breath.    Gastrointestinal: Negative for abdominal pain, heartburn, melena, nausea, and vomitting.    Genitourinary:  Negative bladder incontinence and dysuria.    Musculoskeletal:  See HPI    Neurological: Negative for numbness.    Psychiatric/Behavioral: Negative for depression.  The patient is not nervous/anxious.      Endocrine: Negative for polyuria    Hematologic/Lymphatic: Negative for bleeding problem.  Does not bruise/bleed easily.    Skin: Negative for poor would healing and rash      Physical Examination:    Vital Signs:  There were no vitals filed for this visit.    There is no height or weight on file to calculate BMI.    This a well-developed, well nourished patient in no acute distress.   They are alert and oriented and cooperative to examination.  Pt. walks without an antalgic gait.      Examination of the r left knee shows no rashes or erythema. There are no masses ecchymosis and a large effusion. Patient has full range of motion from 0-130°. Patient is moderately tender to palpation over lateral joint line and nontender to palpation over the medial joint line. Patient has a - Lachman exam, - anterior drawer exam, and - posterior drawer exam. - Apley exam. Knee is stable to varus and valgus stress. 5 out of 5 motor strength. Palpable distal pulses. Intact light touch sensation. Negative Patellofemoral crepitus    Heart is regular rate without obvious murmurs   Normal respiratory effort without audible wheezing  Abdomen is soft and nontender       X-rays:  X-rays of the left knee are ordered and review which show moderate to severe knee arthritis with near complete lateral joint space narrowing of the left knee and near complete joint space narrowing medially of the right knee.         Assessment:  Bilateral knee osteoarthritis    Plan:  I reviewed the findings with him today.  Patient has tried conservative measures for years.  He is interested in knee replacement.  Plan is for left robotic assisted total knee arthroplasty.  Risks, benefits, and alternatives to the procedure were explained to the patient including but not limited to damage to nerves, arteries, blood vessels, bones, tendons, ligaments, stiffness, instability, infection, permanent limb dysfunction, DVT, PE, as well as general anesthetic complications including seizure, stroke, heart attack and even death. The patient understood these risks and wished to proceed and signed the informed consent.               All previous pertinent notes including ER visits, physical therapy visits, other orthopedic visits as well as other care for the same musculoskeletal problem were reviewed.  All pertinent lab values and previous imaging was  reviewed pertinent to the current visit.    This note was created using [employer] voice recognition software that occasionally misinterpreted phrases or words.    Consult note is delivered via Epic messaging service.

## 2024-09-16 DIAGNOSIS — I45.3 TRIFASCICULAR BLOCK: Primary | ICD-10-CM

## 2024-09-17 ENCOUNTER — HOSPITAL ENCOUNTER (OUTPATIENT)
Dept: RADIOLOGY | Facility: HOSPITAL | Age: 69
Discharge: HOME | End: 2024-09-17
Attending: SPECIALIST
Payer: MEDICARE

## 2024-09-17 DIAGNOSIS — I45.3 TRIFASCICULAR BLOCK: ICD-10-CM

## 2024-09-17 PROCEDURE — 71120 X-RAY EXAM BREASTBONE 2/>VWS: CPT | Mod: TC,PO

## 2024-09-17 PROCEDURE — 71120 X-RAY EXAM BREASTBONE 2/>VWS: CPT | Mod: 26,,, | Performed by: RADIOLOGY

## 2024-10-10 ENCOUNTER — PATIENT MESSAGE (OUTPATIENT)
Dept: ORTHOPEDICS | Facility: CLINIC | Age: 69
End: 2024-10-10
Payer: MEDICARE

## 2024-10-10 DIAGNOSIS — M17.12 PRIMARY OSTEOARTHRITIS OF LEFT KNEE: Primary | ICD-10-CM

## 2024-10-17 DIAGNOSIS — Z01.818 PRE-OP TESTING: ICD-10-CM

## 2024-10-17 DIAGNOSIS — M17.12 PRIMARY OSTEOARTHRITIS OF LEFT KNEE: Primary | ICD-10-CM

## 2024-10-17 RX ORDER — SODIUM CHLORIDE 9 MG/ML
INJECTION, SOLUTION INTRAVENOUS CONTINUOUS
OUTPATIENT
Start: 2024-10-17

## 2024-10-17 RX ORDER — MUPIROCIN 20 MG/G
OINTMENT TOPICAL
OUTPATIENT
Start: 2024-10-17

## 2024-10-17 RX ORDER — CEFAZOLIN SODIUM 2 G/50ML
2 SOLUTION INTRAVENOUS
OUTPATIENT
Start: 2024-10-17

## 2024-10-30 ENCOUNTER — HOSPITAL ENCOUNTER (OUTPATIENT)
Dept: PREADMISSION TESTING | Facility: HOSPITAL | Age: 69
Discharge: HOME | End: 2024-10-30
Attending: ORTHOPAEDIC SURGERY
Payer: MEDICARE

## 2024-10-30 ENCOUNTER — HOSPITAL ENCOUNTER (OUTPATIENT)
Dept: RADIOLOGY | Facility: HOSPITAL | Age: 69
Discharge: HOME | End: 2024-10-30
Attending: ORTHOPAEDIC SURGERY
Payer: MEDICARE

## 2024-10-30 DIAGNOSIS — Z01.818 PREOP TESTING: Primary | ICD-10-CM

## 2024-10-30 DIAGNOSIS — Z01.818 PRE-OP TESTING: ICD-10-CM

## 2024-10-30 DIAGNOSIS — Z96.652 S/P TOTAL KNEE REPLACEMENT, LEFT: Primary | ICD-10-CM

## 2024-10-30 DIAGNOSIS — M17.12 PRIMARY OSTEOARTHRITIS OF LEFT KNEE: ICD-10-CM

## 2024-10-30 DIAGNOSIS — M17.12 PRIMARY OSTEOARTHRITIS OF LEFT KNEE: Primary | ICD-10-CM

## 2024-10-30 LAB
ANION GAP SERPL CALC-SCNC: 10 MMOL/L (ref 8–16)
BASOPHILS # BLD AUTO: 0.03 K/UL (ref 0–0.2)
BASOPHILS NFR BLD: 0.4 % (ref 0–1.9)
BUN SERPL-MCNC: 24 MG/DL (ref 8–23)
CALCIUM SERPL-MCNC: 9.5 MG/DL (ref 8.7–10.5)
CHLORIDE SERPL-SCNC: 102 MMOL/L (ref 95–110)
CO2 SERPL-SCNC: 28 MMOL/L (ref 23–29)
CREAT SERPL-MCNC: 1.4 MG/DL (ref 0.5–1.4)
DIFFERENTIAL METHOD BLD: ABNORMAL
EOSINOPHIL # BLD AUTO: 0.1 K/UL (ref 0–0.5)
EOSINOPHIL NFR BLD: 1.3 % (ref 0–8)
ERYTHROCYTE [DISTWIDTH] IN BLOOD BY AUTOMATED COUNT: 13.6 % (ref 11.5–14.5)
EST. GFR  (NO RACE VARIABLE): 54 ML/MIN/1.73 M^2
GLUCOSE SERPL-MCNC: 106 MG/DL (ref 70–110)
HCT VFR BLD AUTO: 37.2 % (ref 40–54)
HGB BLD-MCNC: 12.2 G/DL (ref 14–18)
IMM GRANULOCYTES # BLD AUTO: 0.02 K/UL (ref 0–0.04)
IMM GRANULOCYTES NFR BLD AUTO: 0.3 % (ref 0–0.5)
LYMPHOCYTES # BLD AUTO: 1.3 K/UL (ref 1–4.8)
LYMPHOCYTES NFR BLD: 17.2 % (ref 18–48)
MCH RBC QN AUTO: 31.1 PG (ref 27–31)
MCHC RBC AUTO-ENTMCNC: 32.8 G/DL (ref 32–36)
MCV RBC AUTO: 95 FL (ref 82–98)
MONOCYTES # BLD AUTO: 0.4 K/UL (ref 0.3–1)
MONOCYTES NFR BLD: 5.9 % (ref 4–15)
MRSA SCREEN BY PCR: NEGATIVE
NEUTROPHILS # BLD AUTO: 5.6 K/UL (ref 1.8–7.7)
NEUTROPHILS NFR BLD: 74.9 % (ref 38–73)
NRBC BLD-RTO: 0 /100 WBC
PLATELET # BLD AUTO: 218 K/UL (ref 150–450)
PMV BLD AUTO: 9.4 FL (ref 9.2–12.9)
POTASSIUM SERPL-SCNC: 3.8 MMOL/L (ref 3.5–5.1)
RBC # BLD AUTO: 3.92 M/UL (ref 4.6–6.2)
SODIUM SERPL-SCNC: 140 MMOL/L (ref 136–145)
WBC # BLD AUTO: 7.5 K/UL (ref 3.9–12.7)

## 2024-10-30 PROCEDURE — 73700 CT LOWER EXTREMITY W/O DYE: CPT | Mod: TC,LT

## 2024-10-30 PROCEDURE — 36415 COLL VENOUS BLD VENIPUNCTURE: CPT | Performed by: ORTHOPAEDIC SURGERY

## 2024-10-30 PROCEDURE — 80048 BASIC METABOLIC PNL TOTAL CA: CPT | Performed by: ORTHOPAEDIC SURGERY

## 2024-10-30 PROCEDURE — 73700 CT LOWER EXTREMITY W/O DYE: CPT | Mod: 26,LT,, | Performed by: RADIOLOGY

## 2024-10-30 PROCEDURE — 87641 MR-STAPH DNA AMP PROBE: CPT | Performed by: ORTHOPAEDIC SURGERY

## 2024-10-30 PROCEDURE — 85025 COMPLETE CBC W/AUTO DIFF WBC: CPT | Performed by: ORTHOPAEDIC SURGERY

## 2024-10-30 RX ORDER — NAPROXEN SODIUM 220 MG/1
81 TABLET, FILM COATED ORAL DAILY
COMMUNITY

## 2024-10-30 RX ORDER — SUCRALFATE 1 G/1
TABLET ORAL
COMMUNITY
Start: 2024-10-14

## 2024-11-06 DIAGNOSIS — Z96.652 S/P TOTAL KNEE REPLACEMENT, LEFT: Primary | ICD-10-CM

## 2024-11-11 ENCOUNTER — ANESTHESIA EVENT (OUTPATIENT)
Dept: SURGERY | Facility: HOSPITAL | Age: 69
End: 2024-11-11
Payer: MEDICARE

## 2024-11-11 DIAGNOSIS — Z96.652 S/P TOTAL KNEE REPLACEMENT, LEFT: Primary | ICD-10-CM

## 2024-11-11 RX ORDER — OXYCODONE AND ACETAMINOPHEN 5; 325 MG/1; MG/1
1 TABLET ORAL EVERY 6 HOURS PRN
Qty: 28 TABLET | Refills: 0 | Status: SHIPPED | OUTPATIENT
Start: 2024-11-11

## 2024-11-11 RX ORDER — ACETAMINOPHEN 500 MG
1000 TABLET ORAL EVERY 8 HOURS PRN
Qty: 30 TABLET | Refills: 0 | Status: SHIPPED | OUTPATIENT
Start: 2024-11-11

## 2024-11-11 RX ORDER — CYCLOBENZAPRINE HCL 10 MG
10 TABLET ORAL 3 TIMES DAILY PRN
Qty: 30 TABLET | Refills: 0 | Status: SHIPPED | OUTPATIENT
Start: 2024-11-11 | End: 2024-11-21

## 2024-11-11 RX ORDER — IBUPROFEN 600 MG/1
600 TABLET ORAL EVERY 6 HOURS PRN
Qty: 30 TABLET | Refills: 0 | Status: SHIPPED | OUTPATIENT
Start: 2024-11-11

## 2024-11-11 RX ORDER — ASPIRIN 81 MG/1
81 TABLET ORAL 2 TIMES DAILY
Qty: 56 TABLET | Refills: 0 | Status: SHIPPED | OUTPATIENT
Start: 2024-11-11 | End: 2025-11-11

## 2024-11-11 RX ORDER — ONDANSETRON 4 MG/1
4 TABLET, FILM COATED ORAL EVERY 6 HOURS PRN
Qty: 30 TABLET | Refills: 0 | Status: SHIPPED | OUTPATIENT
Start: 2024-11-11

## 2024-11-12 ENCOUNTER — HOSPITAL ENCOUNTER (OUTPATIENT)
Facility: HOSPITAL | Age: 69
Discharge: HOME | End: 2024-11-12
Attending: ORTHOPAEDIC SURGERY | Admitting: ORTHOPAEDIC SURGERY
Payer: MEDICARE

## 2024-11-12 ENCOUNTER — ANESTHESIA (OUTPATIENT)
Dept: SURGERY | Facility: HOSPITAL | Age: 69
End: 2024-11-12
Payer: MEDICARE

## 2024-11-12 VITALS
OXYGEN SATURATION: 100 % | HEART RATE: 60 BPM | RESPIRATION RATE: 20 BRPM | HEIGHT: 75 IN | SYSTOLIC BLOOD PRESSURE: 151 MMHG | DIASTOLIC BLOOD PRESSURE: 69 MMHG | TEMPERATURE: 97 F | BODY MASS INDEX: 28.97 KG/M2 | WEIGHT: 233 LBS

## 2024-11-12 DIAGNOSIS — M17.12 PRIMARY OSTEOARTHRITIS OF LEFT KNEE: ICD-10-CM

## 2024-11-12 DIAGNOSIS — Z01.818 PRE-OP TESTING: ICD-10-CM

## 2024-11-12 DIAGNOSIS — I97.89 BRADYCARDIA FOLLOWING SURGERY: ICD-10-CM

## 2024-11-12 PROCEDURE — 71000016 HC POSTOP RECOV ADDL HR: Performed by: ORTHOPAEDIC SURGERY

## 2024-11-12 PROCEDURE — 25000003 PHARM REV CODE 250: Performed by: ANESTHESIOLOGY

## 2024-11-12 PROCEDURE — 93005 ELECTROCARDIOGRAM TRACING: CPT

## 2024-11-12 PROCEDURE — C1769 GUIDE WIRE: HCPCS | Performed by: ORTHOPAEDIC SURGERY

## 2024-11-12 PROCEDURE — 36000713 HC OR TIME LEV V EA ADD 15 MIN: Performed by: ORTHOPAEDIC SURGERY

## 2024-11-12 PROCEDURE — 63600175 PHARM REV CODE 636 W HCPCS: Performed by: ORTHOPAEDIC SURGERY

## 2024-11-12 PROCEDURE — 27201423 OPTIME MED/SURG SUP & DEVICES STERILE SUPPLY: Performed by: ORTHOPAEDIC SURGERY

## 2024-11-12 PROCEDURE — 25000003 PHARM REV CODE 250: Performed by: NURSE ANESTHETIST, CERTIFIED REGISTERED

## 2024-11-12 PROCEDURE — 71000039 HC RECOVERY, EACH ADD'L HOUR: Performed by: ORTHOPAEDIC SURGERY

## 2024-11-12 PROCEDURE — 63600175 PHARM REV CODE 636 W HCPCS: Performed by: NURSE ANESTHETIST, CERTIFIED REGISTERED

## 2024-11-12 PROCEDURE — 64447 NJX AA&/STRD FEMORAL NRV IMG: CPT | Performed by: ANESTHESIOLOGY

## 2024-11-12 PROCEDURE — C1713 ANCHOR/SCREW BN/BN,TIS/BN: HCPCS | Performed by: ORTHOPAEDIC SURGERY

## 2024-11-12 PROCEDURE — C1776 JOINT DEVICE (IMPLANTABLE): HCPCS | Performed by: ORTHOPAEDIC SURGERY

## 2024-11-12 PROCEDURE — 97110 THERAPEUTIC EXERCISES: CPT

## 2024-11-12 PROCEDURE — 25000003 PHARM REV CODE 250: Performed by: ORTHOPAEDIC SURGERY

## 2024-11-12 PROCEDURE — 27447 TOTAL KNEE ARTHROPLASTY: CPT | Mod: LT,,, | Performed by: ORTHOPAEDIC SURGERY

## 2024-11-12 PROCEDURE — 63600175 PHARM REV CODE 636 W HCPCS: Performed by: ANESTHESIOLOGY

## 2024-11-12 PROCEDURE — 94799 UNLISTED PULMONARY SVC/PX: CPT

## 2024-11-12 PROCEDURE — 37000009 HC ANESTHESIA EA ADD 15 MINS: Performed by: ORTHOPAEDIC SURGERY

## 2024-11-12 PROCEDURE — 97161 PT EVAL LOW COMPLEX 20 MIN: CPT

## 2024-11-12 PROCEDURE — 0055T BONE SRGRY CMPTR CT/MRI IMAG: CPT | Mod: ,,, | Performed by: ORTHOPAEDIC SURGERY

## 2024-11-12 PROCEDURE — C9290 INJ, BUPIVACAINE LIPOSOME: HCPCS | Performed by: ANESTHESIOLOGY

## 2024-11-12 PROCEDURE — 37000008 HC ANESTHESIA 1ST 15 MINUTES: Performed by: ORTHOPAEDIC SURGERY

## 2024-11-12 PROCEDURE — 71000015 HC POSTOP RECOV 1ST HR: Performed by: ORTHOPAEDIC SURGERY

## 2024-11-12 PROCEDURE — 71000033 HC RECOVERY, INTIAL HOUR: Performed by: ORTHOPAEDIC SURGERY

## 2024-11-12 PROCEDURE — 97116 GAIT TRAINING THERAPY: CPT

## 2024-11-12 PROCEDURE — 36000712 HC OR TIME LEV V 1ST 15 MIN: Performed by: ORTHOPAEDIC SURGERY

## 2024-11-12 DEVICE — CRUCIATE RETAINING FEMORAL
Type: IMPLANTABLE DEVICE | Site: KNEE | Status: FUNCTIONAL
Brand: TRIATHLON

## 2024-11-12 DEVICE — TIBIAL BEARING INSERT
Type: IMPLANTABLE DEVICE | Site: KNEE | Status: FUNCTIONAL
Brand: TRIATHLON

## 2024-11-12 DEVICE — PRIMARY TIBIAL BASEPLATE
Type: IMPLANTABLE DEVICE | Site: KNEE | Status: FUNCTIONAL
Brand: TRIATHLON

## 2024-11-12 DEVICE — CEMENT BONE ANTIBIO SIMPLEX P: Type: IMPLANTABLE DEVICE | Site: KNEE | Status: FUNCTIONAL

## 2024-11-12 DEVICE — PATELLA
Type: IMPLANTABLE DEVICE | Site: KNEE | Status: FUNCTIONAL
Brand: TRIATHLON

## 2024-11-12 RX ORDER — ONDANSETRON HYDROCHLORIDE 2 MG/ML
INJECTION, SOLUTION INTRAMUSCULAR; INTRAVENOUS
Status: DISCONTINUED | OUTPATIENT
Start: 2024-11-12 | End: 2024-11-12

## 2024-11-12 RX ORDER — BUPIVACAINE HYDROCHLORIDE 7.5 MG/ML
INJECTION, SOLUTION EPIDURAL; RETROBULBAR
Status: DISCONTINUED | OUTPATIENT
Start: 2024-11-12 | End: 2024-11-12

## 2024-11-12 RX ORDER — BUPIVACAINE HYDROCHLORIDE 5 MG/ML
INJECTION, SOLUTION EPIDURAL; INTRACAUDAL
Status: DISCONTINUED | OUTPATIENT
Start: 2024-11-12 | End: 2024-11-12

## 2024-11-12 RX ORDER — SODIUM CHLORIDE 9 MG/ML
INJECTION, SOLUTION INTRAVENOUS CONTINUOUS
Status: DISCONTINUED | OUTPATIENT
Start: 2024-11-12 | End: 2024-11-12 | Stop reason: HOSPADM

## 2024-11-12 RX ORDER — ACETAMINOPHEN 500 MG
1000 TABLET ORAL
Status: COMPLETED | OUTPATIENT
Start: 2024-11-12 | End: 2024-11-12

## 2024-11-12 RX ORDER — CELECOXIB 100 MG/1
400 CAPSULE ORAL
Status: COMPLETED | OUTPATIENT
Start: 2024-11-12 | End: 2024-11-12

## 2024-11-12 RX ORDER — MUPIROCIN 20 MG/G
OINTMENT TOPICAL
Status: DISCONTINUED | OUTPATIENT
Start: 2024-11-12 | End: 2024-11-12 | Stop reason: HOSPADM

## 2024-11-12 RX ORDER — LIDOCAINE HYDROCHLORIDE 10 MG/ML
1 INJECTION, SOLUTION EPIDURAL; INFILTRATION; INTRACAUDAL; PERINEURAL ONCE
Status: DISCONTINUED | OUTPATIENT
Start: 2024-11-12 | End: 2024-11-12 | Stop reason: HOSPADM

## 2024-11-12 RX ORDER — OXYCODONE HYDROCHLORIDE 5 MG/1
5 TABLET ORAL ONCE AS NEEDED
Status: DISCONTINUED | OUTPATIENT
Start: 2024-11-12 | End: 2024-11-12 | Stop reason: HOSPADM

## 2024-11-12 RX ORDER — SODIUM CHLORIDE, SODIUM LACTATE, POTASSIUM CHLORIDE, CALCIUM CHLORIDE 600; 310; 30; 20 MG/100ML; MG/100ML; MG/100ML; MG/100ML
INJECTION, SOLUTION INTRAVENOUS CONTINUOUS
Status: CANCELLED | OUTPATIENT
Start: 2024-11-12

## 2024-11-12 RX ORDER — DEXAMETHASONE SODIUM PHOSPHATE 4 MG/ML
INJECTION, SOLUTION INTRA-ARTICULAR; INTRALESIONAL; INTRAMUSCULAR; INTRAVENOUS; SOFT TISSUE
Status: DISCONTINUED | OUTPATIENT
Start: 2024-11-12 | End: 2024-11-12

## 2024-11-12 RX ORDER — TRANEXAMIC ACID 100 MG/ML
INJECTION, SOLUTION INTRAVENOUS
Status: DISCONTINUED | OUTPATIENT
Start: 2024-11-12 | End: 2024-11-12

## 2024-11-12 RX ORDER — PROPOFOL 10 MG/ML
VIAL (ML) INTRAVENOUS
Status: DISCONTINUED | OUTPATIENT
Start: 2024-11-12 | End: 2024-11-12

## 2024-11-12 RX ORDER — KETAMINE HCL IN 0.9 % NACL 50 MG/5 ML
SYRINGE (ML) INTRAVENOUS
Status: DISCONTINUED | OUTPATIENT
Start: 2024-11-12 | End: 2024-11-12

## 2024-11-12 RX ORDER — LIDOCAINE HYDROCHLORIDE 20 MG/ML
INJECTION INTRAVENOUS
Status: DISCONTINUED | OUTPATIENT
Start: 2024-11-12 | End: 2024-11-12

## 2024-11-12 RX ORDER — EPHEDRINE SULFATE 50 MG/ML
INJECTION, SOLUTION INTRAVENOUS
Status: DISCONTINUED | OUTPATIENT
Start: 2024-11-12 | End: 2024-11-12

## 2024-11-12 RX ORDER — FENTANYL CITRATE 50 UG/ML
25-200 INJECTION, SOLUTION INTRAMUSCULAR; INTRAVENOUS
Status: DISCONTINUED | OUTPATIENT
Start: 2024-11-12 | End: 2024-11-12 | Stop reason: HOSPADM

## 2024-11-12 RX ORDER — ONDANSETRON HYDROCHLORIDE 2 MG/ML
4 INJECTION, SOLUTION INTRAVENOUS ONCE AS NEEDED
Status: DISCONTINUED | OUTPATIENT
Start: 2024-11-12 | End: 2024-11-12 | Stop reason: HOSPADM

## 2024-11-12 RX ORDER — PHENYLEPHRINE HYDROCHLORIDE 10 MG/ML
INJECTION INTRAVENOUS
Status: DISCONTINUED | OUTPATIENT
Start: 2024-11-12 | End: 2024-11-12

## 2024-11-12 RX ORDER — BUPIVACAINE 13.3 MG/ML
INJECTION, SUSPENSION, LIPOSOMAL INFILTRATION
Status: DISCONTINUED | OUTPATIENT
Start: 2024-11-12 | End: 2024-11-12

## 2024-11-12 RX ORDER — FENTANYL CITRATE 50 UG/ML
25 INJECTION, SOLUTION INTRAMUSCULAR; INTRAVENOUS EVERY 5 MIN PRN
Status: DISCONTINUED | OUTPATIENT
Start: 2024-11-12 | End: 2024-11-12 | Stop reason: HOSPADM

## 2024-11-12 RX ORDER — PROPOFOL 10 MG/ML
VIAL (ML) INTRAVENOUS CONTINUOUS PRN
Status: DISCONTINUED | OUTPATIENT
Start: 2024-11-12 | End: 2024-11-12

## 2024-11-12 RX ORDER — OXYCODONE HCL 10 MG/1
10 TABLET, FILM COATED, EXTENDED RELEASE ORAL ONCE
Status: COMPLETED | OUTPATIENT
Start: 2024-11-12 | End: 2024-11-12

## 2024-11-12 RX ORDER — MIDAZOLAM HYDROCHLORIDE 1 MG/ML
2 INJECTION, SOLUTION INTRAMUSCULAR; INTRAVENOUS
Status: DISCONTINUED | OUTPATIENT
Start: 2024-11-12 | End: 2024-11-12 | Stop reason: HOSPADM

## 2024-11-12 RX ORDER — CEFAZOLIN 2 G/1
2 INJECTION, POWDER, FOR SOLUTION INTRAMUSCULAR; INTRAVENOUS
Status: COMPLETED | OUTPATIENT
Start: 2024-11-12 | End: 2024-11-12

## 2024-11-12 RX ADMIN — FENTANYL CITRATE 50 MCG: 50 INJECTION, SOLUTION INTRAMUSCULAR; INTRAVENOUS at 09:11

## 2024-11-12 RX ADMIN — MUPIROCIN 1 G: 20 OINTMENT TOPICAL at 09:11

## 2024-11-12 RX ADMIN — EPHEDRINE SULFATE 10 MG: 50 INJECTION, SOLUTION INTRAMUSCULAR; INTRAVENOUS; SUBCUTANEOUS at 10:11

## 2024-11-12 RX ADMIN — TRANEXAMIC ACID 1000 MG: 100 INJECTION, SOLUTION INTRAVENOUS at 11:11

## 2024-11-12 RX ADMIN — PHENYLEPHRINE HYDROCHLORIDE 200 MCG: 10 INJECTION INTRAVENOUS at 10:11

## 2024-11-12 RX ADMIN — CEFAZOLIN 2 G: 2 INJECTION, POWDER, FOR SOLUTION INTRAMUSCULAR; INTRAVENOUS at 10:11

## 2024-11-12 RX ADMIN — PROPOFOL 10 MG: 10 INJECTION, EMULSION INTRAVENOUS at 10:11

## 2024-11-12 RX ADMIN — MIDAZOLAM HYDROCHLORIDE 1 MG: 1 INJECTION, SOLUTION INTRAMUSCULAR; INTRAVENOUS at 09:11

## 2024-11-12 RX ADMIN — DEXAMETHASONE SODIUM PHOSPHATE 8 MG: 4 INJECTION, SOLUTION INTRA-ARTICULAR; INTRALESIONAL; INTRAMUSCULAR; INTRAVENOUS; SOFT TISSUE at 10:11

## 2024-11-12 RX ADMIN — CELECOXIB 400 MG: 100 CAPSULE ORAL at 09:11

## 2024-11-12 RX ADMIN — BUPIVACAINE HYDROCHLORIDE 10 ML: 5 INJECTION, SOLUTION EPIDURAL; INTRACAUDAL; PERINEURAL at 09:11

## 2024-11-12 RX ADMIN — MIDAZOLAM HYDROCHLORIDE 1 MG: 1 INJECTION, SOLUTION INTRAMUSCULAR; INTRAVENOUS at 10:11

## 2024-11-12 RX ADMIN — GLYCOPYRROLATE 0.2 MG: 0.2 INJECTION, SOLUTION INTRAMUSCULAR; INTRAVITREAL at 10:11

## 2024-11-12 RX ADMIN — PROPOFOL 50 MCG/KG/MIN: 10 INJECTION, EMULSION INTRAVENOUS at 10:11

## 2024-11-12 RX ADMIN — SODIUM CHLORIDE, SODIUM GLUCONATE, SODIUM ACETATE, POTASSIUM CHLORIDE AND MAGNESIUM CHLORIDE: 526; 502; 368; 37; 30 INJECTION, SOLUTION INTRAVENOUS at 09:11

## 2024-11-12 RX ADMIN — LIDOCAINE HYDROCHLORIDE 50 MG: 20 INJECTION, SOLUTION INTRAVENOUS at 10:11

## 2024-11-12 RX ADMIN — ONDANSETRON 4 MG: 2 INJECTION INTRAMUSCULAR; INTRAVENOUS at 10:11

## 2024-11-12 RX ADMIN — ROPIVACAINE HYDROCHLORIDE: 5 INJECTION EPIDURAL; INFILTRATION; PERINEURAL at 09:11

## 2024-11-12 RX ADMIN — TRANEXAMIC ACID 1000 MG: 100 INJECTION, SOLUTION INTRAVENOUS at 10:11

## 2024-11-12 RX ADMIN — BUPIVACAINE 20 ML: 13.3 INJECTION, SUSPENSION, LIPOSOMAL INFILTRATION at 09:11

## 2024-11-12 RX ADMIN — FENTANYL CITRATE 50 MCG: 50 INJECTION, SOLUTION INTRAMUSCULAR; INTRAVENOUS at 10:11

## 2024-11-12 RX ADMIN — OXYCODONE HYDROCHLORIDE 10 MG: 10 TABLET, FILM COATED, EXTENDED RELEASE ORAL at 09:11

## 2024-11-12 RX ADMIN — Medication 10 MG: at 10:11

## 2024-11-12 RX ADMIN — BUPIVACAINE HYDROCHLORIDE 1.6 ML: 7.5 INJECTION, SOLUTION EPIDURAL; RETROBULBAR at 10:11

## 2024-11-12 RX ADMIN — ACETAMINOPHEN 1000 MG: 500 TABLET ORAL at 09:11

## 2024-11-12 NOTE — ANESTHESIA PROCEDURE NOTES
Spinal    Diagnosis: OA  Patient location during procedure: OR  Start time: 11/12/2024 10:20 AM  Timeout: 11/12/2024 10:18 AM  End time: 11/12/2024 10:25 AM    Staffing  Authorizing Provider: Scarlett Young MD  Performing Provider: Scarlett Young MD    Staffing  Performed by: Scarlett Young MD  Authorized by: Scarlett Young MD    Preanesthetic Checklist  Completed: patient identified, IV checked, site marked, risks and benefits discussed, surgical consent, monitors and equipment checked, pre-op evaluation and timeout performed  Spinal Block  Patient position: sitting  Prep: ChloraPrep  Patient monitoring: heart rate, cardiac monitor, continuous pulse ox, continuous capnometry and frequent blood pressure checks  Approach: midline  Location: L2-3  Injection technique: single shot  CSF Fluid: clear free-flowing CSF  Needle  Needle type: Pravin   Needle gauge: 25 G  Needle length: 3.5 in  Additional Documentation: incremental injection, negative aspiration for heme and no paresthesia on injection  Needle localization: anatomical landmarks  Assessment  Sensory level: T10   Dermatomal levels determined by alcohol wipe  Ease of block: easy  Patient's tolerance of the procedure: comfortable throughout block  Medications:    Medications: bupivacaine (pf) (MARCAINE) injection 0.75% - Intraspinal   1.6 mL - 11/12/2024 10:25:00 AM

## 2024-11-12 NOTE — TRANSFER OF CARE
"Anesthesia Transfer of Care Note    Patient: Willi Diaz    Procedure(s) Performed: Procedure(s) (LRB):  ROBOTIC ARTHROPLASTY, KNEE, TOTAL (Left)    Patient location: PACU    Anesthesia Type: spinal    Transport from OR: Transported from OR on 2-3 L/min O2 by NC with adequate spontaneous ventilation    Post pain: adequate analgesia    Post assessment: no apparent anesthetic complications    Post vital signs: stable    Level of consciousness: awake and alert    Nausea/Vomiting: no nausea/vomiting    Complications: none    Transfer of care protocol was followed    Last vitals: Visit Vitals  /73   Pulse (!) 54   Temp 36.6 °C (97.9 °F) (Skin)   Resp 16   Ht 6' 3" (1.905 m)   Wt 105.7 kg (233 lb)   SpO2 100%   BMI 29.12 kg/m²     "

## 2024-11-12 NOTE — ANESTHESIA PROCEDURE NOTES
Peripheral Block    Patient location during procedure: pre-op   Block not for primary anesthetic.  Reason for block: at surgeon's request and post-op pain management   Post-op Pain Location: left knee   Start time: 11/12/2024 9:51 AM  Timeout: 11/12/2024 9:50 AM   End time: 11/12/2024 9:55 AM    Staffing  Authorizing Provider: Scarlett Young MD  Performing Provider: Scarlett Young MD    Staffing  Performed by: Scarlett Young MD  Authorized by: Scarlett Young MD    Preanesthetic Checklist  Completed: patient identified, IV checked, site marked, risks and benefits discussed, surgical consent, monitors and equipment checked, pre-op evaluation and timeout performed  Peripheral Block  Patient position: supine  Prep: ChloraPrep  Patient monitoring: heart rate, cardiac monitor, continuous pulse ox, continuous capnometry and frequent blood pressure checks  Block type: adductor canal  Laterality: left  Injection technique: single shot  Needle  Needle type: Stimuplex   Needle gauge: 21 G  Needle length: 4 in  Needle localization: anatomical landmarks and ultrasound guidance   -ultrasound image captured on disc.  Assessment  Injection assessment: negative aspiration, negative parasthesia and local visualized surrounding nerve  Paresthesia pain: none  Heart rate change: no  Slow fractionated injection: yes  Pain Tolerance: comfortable throughout block and no complaints  Medications:    Medications: bupivacaine (pf) (MARCAINE) injection 0.5% - Perineural   10 mL - 11/12/2024 9:55:00 AM    Additional Notes  VSS.  [facility] RN monitoring vitals throughout procedure.  Patient tolerated procedure well.     Exparel 20mL

## 2024-11-12 NOTE — PT/OT/SLP EVAL
Physical Therapy Evaluation and Discharge Note    Patient Name:  Willi Diaz   MRN:  [id number]    Recommendations:     Discharge Recommendations: Low Intensity Therapy  Discharge Equipment Recommendations: none   Barriers to discharge: None    Assessment:     Willi Diaz is a 69 y.o. male admitted with a medical diagnosis of <principal problem not specified>. .  Pt seen at post 07, alert and eager for PT. Pt completed thera ex in supine with good execution. Min assist to stand with RW and ambulated 250ft with 1 seated rest. Pt completed stair training x 2 trials min/cga. Back to room and up in chair. No urge to void  LIT    Recent Surgery: Procedure(s) (LRB):  ROBOTIC ARTHROPLASTY, KNEE, TOTAL (Left) Day of Surgery    Plan:     During this hospitalization, patient does not require further acute PT services.  Please re-consult if situation changes.      Subjective     Chief Complaint: a little tightness LK  Patient/Family Comments/goals: get well  Pain/Comfort:  Pain Rating 1: 2/10  Location - Side 1: Left  Location 1: knee  Pain Addressed 1: Reposition, Distraction, Cessation of Activity    Patients cultural, spiritual, Taoism conflicts given the current situation:      Living Environment:  Home with family  10 steps to enter home    Prior to admission, patients level of function was ambulatory/indep.  Equipment used at home: none.  DME owned (not currently used): rolling walker.  Upon discharge, patient will have assistance from family.    Objective:     Communicated with nurse Cabrera prior to session.  Patient found HOB elevated with   upon PT entry to room.    General Precautions: Standard, fall    Orthopedic Precautions:LLE weight bearing as tolerated   Braces: N/A  Respiratory Status: Room air    Exams:  Postural Exam:  Patient presented with the following abnormalities:    -       Rounded shoulders  -       Forward head  -       BMI 29.12  RLE ROM: WFL  RLE Strength: WFL  LLE ROM: Deficits: LK  flexion ~80*  LLE Strength: Deficits: 3/5    Functional Mobility:  Bed Mobility:     Scooting: minimum assistance  Supine to Sit: minimum assistance  Transfers:     Sit to Stand:  minimum assistance with rolling walker  Gait: 250ft x2 with 1 seated rest  Stairs:  Pt ascended/descended 5 stair(s) with No Assistive Device with bilateral handrails with Minimal Assistance.  X 2 trials    AM-PAC 6 CLICK MOBILITY  Total Score:18       Treatment and Education:  Patient was educated on the importance of OOB activity and functional mobility to negate negative effects of prolonged bed rest during hospitalization, safe transfers and ambulation, and D/C planning   Thera ex with AP,QS/GS,SLR and HS  Gait at hallways with RW  Stair training with rails x5 steps x 2 trials  Up in chair post PT  Educated on HEP- pt to attend OP    AM-PAC 6 CLICK MOBILITY  Total Score:18     Patient left up in chair with all lines intact, call button in reach, and family present.    GOALS:   Multidisciplinary Problems       Physical Therapy Goals       Not on file                    History:     Past Medical History:   Diagnosis Date    Anemia due to chronic blood loss 10/16/2022    Anemia, unspecified 10/16/2022    Anticoagulant long-term use     Arthritis     Diabetes mellitus     type 2    Encounter for blood transfusion     GI bleed     Gout, unspecified     Hypertension     Iron deficiency anemia 10/16/2022    Normochromic normocytic anemia 10/16/2022    Paroxysmal atrial fibrillation 2022       Past Surgical History:   Procedure Laterality Date    COLONOSCOPY N/A 09/21/2022    Procedure: COLONOSCOPY;  Surgeon: Naseem Suh MD;  Location: [facility];  Service: Endoscopy;  Laterality: N/A;    ESOPHAGOGASTRODUODENOSCOPY N/A 07/27/2022    Procedure: EGD (ESOPHAGOGASTRODUODENOSCOPY);  Surgeon: Naseem Suh MD;  Location: [facility];  Service: Endoscopy;  Laterality: N/A;    ESOPHAGOGASTRODUODENOSCOPY N/A 09/21/2022    Procedure: EGD  (ESOPHAGOGASTRODUODENOSCOPY);  Surgeon: Naseem Suh MD;  Location: [facility];  Service: Endoscopy;  Laterality: N/A;    watchman      12/2024       Time Tracking:     PT Received On: 11/12/24  PT Start Time: 1558     PT Stop Time: 1637  PT Total Time (min): 39 min     Billable Minutes: Evaluation 10, Gait Training 19, and Therapeutic Exercise 10      11/12/2024

## 2024-11-12 NOTE — CARE UPDATE
Bradycardia in the 40's noted intermittently. Dr. Young at bedside to [facility]. 12 lead EKG ordered and done. Reviewed by Dr. Ramon and within limits of pre-op EKG. Will monitor a little longer.     1400-HR in the 60's, moving lower extremities and lifting them off bed. Dr. Young notified and released to [facility]. VS WNL, dressing dry and intact. Denies pain and nausea. Due to void.

## 2024-11-12 NOTE — ANESTHESIA POSTPROCEDURE EVALUATION
Anesthesia Post Evaluation    Patient: Willi Diaz    Procedure(s) Performed: Procedure(s) (LRB):  ROBOTIC ARTHROPLASTY, KNEE, TOTAL (Left)    Final Anesthesia Type: spinal      Patient location during evaluation: PACU  Patient participation: Yes- Able to Participate  Level of consciousness: awake and alert  Post-procedure vital signs: reviewed and stable  Pain management: adequate  Airway patency: patent    PONV status at discharge: No PONV  Anesthetic complications: no      Cardiovascular status: blood pressure returned to baseline  Respiratory status: unassisted and room air  Hydration status: euvolemic  Follow-up not needed.              Vitals Value Taken Time   /77 11/12/24 1355   Temp 36.3 °C (97.4 °F) 11/12/24 1205   Pulse 60 11/12/24 1355   Resp 20 11/12/24 1355   SpO2 99 % 11/12/24 1355         No case tracking events are documented in the log.      Pain/Alayna Score: Pain Rating Prior to Med Admin: 5 (11/12/2024  9:28 AM)  Alayna Score: 10 (11/12/2024  1:45 PM)

## 2024-11-12 NOTE — DISCHARGE SUMMARY
[facility]  [facility]  Short Stay    Procedure(s) (LRB):  ROBOTIC ARTHROPLASTY, KNEE, TOTAL (Left)      OUTCOME: Patient tolerated treatment/procedure well without complication and is now ready for discharge.    DISPOSITION: Home or Self Care    FINAL DIAGNOSIS:  <principal problem not specified>    FOLLOWUP: In clinic    DISCHARGE INSTRUCTIONS:    Discharge Procedure Orders   Diet general     Leave dressing on - Keep it clean, dry, and intact until clinic visit     Change dressing (specify)   Order Comments: Dressing change: If dressing loses its seal, change daily.     Call MD for:  temperature >100.4     Call MD for:  persistent nausea and vomiting     Call MD for:  severe uncontrolled pain     Call MD for:  difficulty breathing, headache or visual disturbances     Call MD for:  redness, tenderness, or signs of infection (pain, swelling, redness, odor or green/yellow discharge around incision site)     Call MD for:  hives     Call MD for:  persistent dizziness or light-headedness     Call MD for:  extreme fatigue     Keep surgical extremity elevated     Ice to affected area   Order Comments: using barrier between ice and skin (specify duration&frequency)     Weight bearing as tolerated     Activity as tolerated     Shower on day dressing removed (No bath)        TIME SPENT ON DISCHARGE: 5 minutes

## 2024-11-12 NOTE — PLAN OF CARE
Pre-op complete. Daughter at bedside. Text notifications initiated. Pt denies need for safe. Pt states daughter has valuables  Belongings in postop.

## 2024-11-12 NOTE — OP NOTE
[facility]  Orthopedic Surgery  Operative Note    SUMMARY     Date of Procedure: 11/12/2024     Procedure: Procedure(s) (LRB):  ROBOTIC ARTHROPLASTY, KNEE, TOTAL (Left)       Surgeons and Role:     * Sen Castro MD - Primary    Assistant: Jean-Pierre GATES    Pre-Operative Diagnosis: Primary osteoarthritis of left knee [M17.12]  Pre-op testing [Z01.818]    Post-Operative Diagnosis: Post-Op Diagnosis Codes:     * Primary osteoarthritis of left knee [M17.12]     * Pre-op testing [Z01.818]    Anesthesia: Choice    Complications: No    Estimated Blood Loss (EBL): 50 mL           Implants:   Implant Name Type Inv. Item Serial No.  Lot No. LRB No. Used Action   PIN BONE [id number] - [id number]  PIN BONE [id number]  [employer]. [id number] Left 1 Implanted and Explanted   PIN FIXATION BONE 140X3.2MM - [id number]  PIN FIXATION BONE 140X3.2MM  [employer]. [id number] Left 1 Implanted and Explanted   CEMENT BONE ANTIBIO SIMPLEX P - [id number]  CEMENT BONE ANTIBIO SIMPLEX P  [employer]. [id number] Left 2 Implanted   COMPONENT CR FEM CEMENTED 6 L - [id number]  COMPONENT CR FEM CEMENTED 6 L  [employer] [id number] Left 1 Implanted   BASEPLATE TIB KAMILLA PRIM SZ 6 - [id number]  BASEPLATE TIB KAMILLA PRIM SZ 6  [employer]. [id number] Left 1 Implanted   PATELLA TRIATHLON 36X10 SYMTRC - [id number]  PATELLA TRIATHLON 36X10 SYMTRC  [employer]. [id number] Left 1 Implanted   INSERT CONVTNL POLYETH 9MM 6 - [id number]  INSERT CONVTNL POLYETH 9MM 6  [employer]. [id number] Left 1 Implanted       Tourniquet time: 53min at 300mmHg    Specimens:   Specimen (24h ago, onward)      None                    Condition: Good    Disposition: PACU - hemodynamically stable.    Attestation: I was present and scrubbed for the entire procedure.    INDICATIONS FOR THE PROCEDURE: A 69M with a history of chronic   Left knee arthritis, had failed all conservative measures including  cortisone   injections, PT, viscosupplementation and activity modification. After a long   discussion, the patient wished to proceed with the procedure above.     PROCEDURE IN DETAIL: Risks, benefits and alternatives of the procedure were   explained to the patient including, but not limited to damage to nerves,   arteries or blood vessels. Also explained risk of infection, stiffness, DVT, PE,   polyethylene wear as well as anesthetic complications including seizure, stroke,   heart attack and death, understood this and signed informed consent. The   patient's Left knee was marked prior to coming to the Operating Room. The patient was brought to the operating room, placed on the operating table in a supine position.A  formal timeout was done in which correct patient, procedure and op site were all   correctly identified and confirmed by the entire operating team.  2gm Ancef was given prior to surgical incision. Spinal anesthesia was induced. The patient'sLeft  lower extremity was prepped and   draped in normal sterile fashion. TheLeft  leg was exsanguinated with an   Esmarch. Tourniquet was inflated up 300 mmHg. Standard anterior approach to   the knee was made. Medial parapatellar arthrotomy was made, leaving about 1/8   inch of tissue for later repair. Proximal medial tibial release was performed,   making sure not to release any of the MCL. We then   everted the patella and reflexed the knee. Fat pad was excised as well as some   of the ACL. Soft tissue off the distal anterior femur was removed for   visualization, so as to help prevent notching.  We started off by placing our femoral pins.  Two pins were placed about 2 centimeters proximal and 1 centimeter anterior to the medial epicondyle.  We then retracted down to the level of the tibial tubercle.    Two pins were placed just short of bicortically in the tibia.  We then hooked up our arays to our pins.  We placed 2 checkpoints.  One in the tibia and 1 in the  femur.  We then took the leg through range of motion.  We then began by marking off our bony points.  We did this 1st on the femur and then on the tibia.  After this we did ligament balancing of the knee 1st extension and then in flexion by using some spoons and an osteotome.  We then adjusted our bone cuts on the computer and once we liked our plan began by making our cuts.  The [employer] robotic assisted cutting arm was then brought in and then we made our femoral cuts and then turned our attention to the tibia.  Tibial cuts were made.  All of bone was removed as well as excess soft tissue.  Posterior osteophytes removed as well.  We then began to trial.  We placed a size 6 femur and a size 6 tibia with a size 9 polyethylene.  We were within 1 millimeter between our medial and lateral compartments in both flexion and extension.        We turned our attention to patella, caliper was used on the patella where it   measured 25. We set guide at 15 and made our 10-mm cut for polyethylene component, 36 was selected. Then drill holes were placed and the patellar button was placed.   This had good tracking. No need for a lateral release and with no hand tests,   it stayed centered the whole time. We then marked our tibial rotation. We then drilled our femoral lugs.  We then   removed everything except the size 6 tibial tray. We then checked our tibial tray   with a drop maricarmen again and then marked our rotation and pinned it into place then   drilled, and then punched for our keel. Robot and pins were all removed. We then started preparing final   components on the back table. Anterior, medial and lateral structures were injected with our local   Cocktail. Bony surfaces   copiously irrigated with Pulsavac and then thoroughly dried. Once the cement   was the appropriate consistency, first the tibia and then the femur were   cemented into place, removing excess cement. A 9 trial was placed. The knee   was held in compression and  then the patella was placed. Cement was allowed to   cure. Once the cement was cured, we then removed excess cement. Again trialed   one final time, again seeing a 9. We then tapped into place the   final 9 meniscal bearing into place. After this was done, we then did our   diluted iodine soak for 3 minutes and then proceeded with closing.  Arthrotomy was closed using our StrataFix.   Subcutaneous tissue was closed using 2-0 Vicryl and skin was using a running 3-0   StrataFix and Dermabond.Instrument, sponge, and needle counts were correct prior to wound closure and at the conclusion of the case.  Sterile dressing was applied.   They were extubated, awakened and transferred from the Operating Room to the   Recovery Room in stable condition.

## 2024-11-12 NOTE — ANESTHESIA PREPROCEDURE EVALUATION
11/12/2024  Willi Diaz is a 69 y.o., male.      Pre-op Assessment          Review of Systems  Cardiovascular:     Hypertension                                    Hypertension     Atrial Fibrillation     Endocrine:  Diabetes    Diabetes                          Physical Exam  General: Well nourished        Anesthesia Plan  Type of Anesthesia, risks & benefits discussed:    Anesthesia Type: Spinal  Intra-op Monitoring Plan: Standard ASA Monitors  Post Op Pain Control Plan: multimodal analgesia, IV/PO Opioids PRN and peripheral nerve block  Induction:  IV  Informed Consent: Informed consent signed with the Patient and all parties understand the risks and agree with anesthesia plan.  All questions answered.   ASA Score: 3  Day of Surgery Review of History & Physical: H&P Update referred to the surgeon/provider.    Ready For Surgery From Anesthesia Perspective.     .

## 2024-11-13 ENCOUNTER — CLINICAL SUPPORT (OUTPATIENT)
Dept: REHABILITATION | Facility: HOSPITAL | Age: 69
End: 2024-11-13
Attending: ORTHOPAEDIC SURGERY
Payer: MEDICARE

## 2024-11-13 DIAGNOSIS — R26.9 GAIT ABNORMALITY: Primary | ICD-10-CM

## 2024-11-13 DIAGNOSIS — Z96.652 S/P TOTAL KNEE REPLACEMENT, LEFT: ICD-10-CM

## 2024-11-13 DIAGNOSIS — M25.60 RANGE OF MOTION DEFICIT: ICD-10-CM

## 2024-11-13 PROCEDURE — 97161 PT EVAL LOW COMPLEX 20 MIN: CPT | Mod: PN

## 2024-11-13 PROCEDURE — 97112 NEUROMUSCULAR REEDUCATION: CPT | Mod: PN

## 2024-11-13 PROCEDURE — 97530 THERAPEUTIC ACTIVITIES: CPT | Mod: PN

## 2024-11-15 PROBLEM — R26.9 GAIT ABNORMALITY: Status: ACTIVE | Noted: 2024-11-15

## 2024-11-15 PROBLEM — M25.60 RANGE OF MOTION DEFICIT: Status: ACTIVE | Noted: 2024-11-15

## 2024-11-15 NOTE — PLAN OF CARE
[facility]   Physical Therapy Initial Evaluation      Name: Willi Diaz  [facility] Number: [id number]    Therapy Diagnosis:   Encounter Diagnoses   Name Primary?    S/P total knee replacement, left     Gait abnormality Yes    Range of motion deficit         Physician: Sen Castro,*    Physician Orders: PT eval and treat  Medical Diagnosis from Referral:   Diagnosis   Z96.652 (ICD-10-CM) - S/P total knee replacement, left     Evaluation Date: 11/13/2024  Authorization Period Expiration: 12/31/2024   Plan of Care Expiration: 12/25/2024  Progress Note Due: 1/13/2024  Visit # / Visits authorized: 1/ 1  FOTO: 1/ 3    Precautions: Standard , HTN, DM     Time In: 1000am  Time Out: 1045am  Total Billable Time: 45 minutes    Date of Procedure: 11/12/2024   Post Op Day: day 1 as of 11/13  Procedure: Procedure(s) (LRB):  ROBOTIC ARTHROPLASTY, KNEE, TOTAL (Left)     Surgeons and Role:     * Sen Castro MD - Primary    Subjective     Date of onset: chronic knee pain, 2 years    History of current condition - Willi reports: knee pain for the past two years that has gotten worse and worse. It was bone on bone. He got to the point where he couldn't walk and fish like he wanted to. He had a TKA yesterday. He was able to walk up his 20 steps into his home after surgery and down this morning for therapy.   He hates pain so he hopes it doesn't get too painful when the nerve block wears off.   He wants to be able to fish and get in and out of the boat.  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 He is off for a while for the surgery and the winter is pretty slow anyway.      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   Prior Level of Function: no pain with work and Activities of daily living   Current Level of  Function: pain affecting work and Activities of daily living     Pain:  Current 2/10, worst 3/10, best 2/10   Location: left knee  Description: achy   Aggravating Factors: weight bearing  Easing Factors: pain meds, ice    Patients goals: get back to normal      Medical History:   Past Medical History:   Diagnosis Date    Anemia due to chronic blood loss 10/16/2022    Anemia, unspecified 10/16/2022    Anticoagulant long-term use     Arthritis     Diabetes mellitus     type 2    Encounter for blood transfusion     GI bleed     Gout, unspecified     Hypertension     Iron deficiency anemia 10/16/2022    Normochromic normocytic anemia 10/16/2022    Paroxysmal atrial fibrillation 2022       Surgical History:   Willi Diaz  has a past surgical history that includes Esophagogastroduodenoscopy (N/A, 07/27/2022); Esophagogastroduodenoscopy (N/A, 09/21/2022); Colonoscopy (N/A, 09/21/2022); watchman; and robotic arthroplasty, knee (Left, 11/12/2024).    Medications:   Willi has a current medication list which includes the following prescription(s): acetaminophen, allopurinol, aspirin, aspirin, colchicine, cyclobenzaprine, diltiazem, docosahexaenoic acid/epa, gabapentin, ibuprofen, iron-vitamin c 100-250 mg (icar-c), losartan-hydrochlorothiazide 50-12.5 mg, lovastatin, meloxicam, metformin, centrum complete, ondansetron, oxycodone-acetaminophen, rosuvastatin, sildenafil, and sucralfate.    Allergies:   Review of patient's allergies indicates:   Allergen Reactions    Ace inhibitors Other (See Comments) and Swelling    Pcn [penicillins] Itching and Swelling    Statins-hmg-coa reductase inhibitors Other (See Comments)     Throat swelling        Objective      Observation:  Patient is a 69 year old male presenting alert and oriented.   Knee wrapped in bandaged and ice pack covered by sweat pants, so unable to observe swelling skin.     Gait:  Ambulating with RW, decreased knee extension at initial contact, forward flexed  posture     Range of Motion:   Knee Left Passive R passive   Flexion 80 120   Extension -5 0     Joint Mobility:  Hypomobile as expected post op    Lower Extremity Strength    Quad Set: good  SLR: able but noting some quad lag    Special Tests:  Not performed due to post op status.    Functional Testing:   Timed Up and Go: 36 seconds      Table: Population Norms for TUG    Age  Average TUG    60 - 69 years  8.1 seconds    70 - 79 years  9.2 seconds    80 - 99 years  11.3 seconds      Palpation:  Unable to palpate due to bandaging    Edema: unable to measure due to bandaging          Intake Outcome Measure for FOTO Knee Survey    Therapist reviewed FOTO scores for Willi Diaz on 11/13/2024.   FOTO report - see Media section or FOTO account episode details.    Intake Score: 59%  Predicted Score: 83%         Treatment     Total Treatment time (time-based codes) separate from Evaluation: 23 minutes     Willi received the treatments listed below:      neuromuscular re-education activities to improve: Balance, Coordination, Proprioception, and Posture for 13 minutes. The following activities were included:    Heel prop  Straight leg raise   Heel slide  Standing heel raises    therapeutic activities to improve functional performance for 10  minutes, including:    Education on condition, surgery, Home Exercise Program       Patient Education and Home Exercises     Education provided:   - Home Exercise Program, diagnosis, prognosis, Plan of care     Written Home Exercises Provided: yes. Exercises were reviewed and Willi was able to demonstrate them prior to the end of the session.  Willi demonstrated good  understanding of the education provided. See EMR under Patient Instructions for exercises provided during therapy sessions.    Assessment     Willi is a 69 y.o. male referred to outpatient Physical Therapy one day post left TKA. He is wearing sweat pants, so I am unable to remove his bandaging and his ice  pack. I instructed him on what he can remove and what he needs to keep on. Range of motion looks good for his status post op, but still limited. He was able to Straight leg raise, but I am noting a lag. Also able to sit to stand without Upper extremity support, but from an elevated surface. TUG score was 36 seconds with RW.     Patient prognosis is Excellent.   Patient will benefit from skilled outpatient Physical Therapy to address the deficits stated above and in the chart below, provide patient /family education, and to maximize patientt's level of independence.     Plan of care discussed with patient: Yes  Patient's spiritual, cultural and educational needs considered and patient is agreeable to the plan of care and goals as stated below:     Anticipated Barriers for therapy: age, co-morbidities     Medical Necessity is demonstrated by the following  History  Co-morbidities and personal factors that may impact the plan of care [] LOW: no personal factors / co-morbidities  [] MODERATE: 1-2 personal factors / co-morbidities  [x] HIGH: 3+ personal factors / co-morbidities    Moderate / High Support Documentation:   Co-morbidities affecting plan of care:   Anemia due to chronic blood loss    Anemia, unspecified    Anticoagulant long-term use    Arthritis    Diabetes mellitus    type 2   Encounter for blood transfusion    GI bleed    Gout, unspecified    Hypertension    Iron deficiency anemia    Normochromic normocytic anemia    Paroxysmal atrial fibrillation    Age     Personal Factors:   no deficits     Examination  Body Structures and Functions, activity limitations and participation restrictions that may impact the plan of care [] LOW: addressing 1-2 elements  [] MODERATE: 3+ elements  [x] HIGH: 4+ elements (please support below)    Moderate / High Support Documentation: posture, gai     Clinical Presentation [x] LOW: stable  [] MODERATE: Evolving  [] HIGH: Unstable     Decision Making/ Complexity Score: low        Goals:  Short Term Goals: (3 weeks)  1. Patient will be independent with Home Exercise Program in order to supplement patient in improving functional mobility.   2. Patient will improve left knee AROM to at least 0-90 in order to improve gait.   3. Patient will improve hip flexor/quadriceps mobility to WNL in order to improve hip/knee AROM and improved gait function   4. Pt will improve hip abduction strength to 4+/5 to improve functional gait deviation.      Long Term Goals: (6 weeks)  1. Patient will be independent with updated HEP supplement PT in improving functional mobility.   2. Patient will improve left knee AROM to at least 0-120 in order to improve gait and ability to perform ADLs.   3. Patient will improve FOTO knee survey score to predicted level in order to demo improved functional mobility.   4. Patient will perform TUG in < 12 seconds without AD in order to decrease risk of falling   5. Patient will perform at least 10 sit to stands without UE support on 30 second sit to stand test in order to demo improved ability to perform transfers.   6. Patient goal: get back to normal     Plan     Plan of care Certification: 11/13/2024 to 12/25/2024.    Outpatient Physical Therapy 1-2 times weekly for 6 weeks to include the following interventions: Gait Training, Manual Therapy, Moist Heat/ Ice, Neuromuscular Re-ed, Patient Education, Therapeutic Activities, and Therapeutic Exercise.     Merline Albarado, PT, DPT  Board Certified Clinical Specialist in Orthopedic Physical Therapy         Physician's Signature: _________________________________________ Date: ________________

## 2024-11-18 ENCOUNTER — CLINICAL SUPPORT (OUTPATIENT)
Dept: REHABILITATION | Facility: HOSPITAL | Age: 69
End: 2024-11-18
Payer: MEDICARE

## 2024-11-18 DIAGNOSIS — M25.60 RANGE OF MOTION DEFICIT: ICD-10-CM

## 2024-11-18 DIAGNOSIS — R26.9 GAIT ABNORMALITY: Primary | ICD-10-CM

## 2024-11-18 PROCEDURE — 97530 THERAPEUTIC ACTIVITIES: CPT | Mod: PN

## 2024-11-18 PROCEDURE — 97112 NEUROMUSCULAR REEDUCATION: CPT | Mod: PN

## 2024-11-18 NOTE — PROGRESS NOTES
"[facility]   Physical Therapy Treatment Note     Name: Willi Leyva [place]  Clinic Number: [id number]    Therapy Diagnosis:   Encounter Diagnoses   Name Primary?    Gait abnormality Yes    Range of motion deficit      Physician: Sen Castro,*    Visit Date: 11/18/2024    Physician Orders: PT eval and treat  Medical Diagnosis from Referral:   Diagnosis   Z96.652 (ICD-10-CM) - S/P total knee replacement, left      Evaluation Date: 11/13/2024  Authorization Period Expiration: 12/31/2024   Plan of Care Expiration: 12/25/2024  Progress Note Due: 1/13/2024  Visit # / Visits authorized: 1/ 20  FOTO: 1/ 3      FOTO 1st: 59%  Predicted Score: 83%  FOTO 3rd:  FOTO 10th:    Time in: 900am  Time out: 950am  Total Billable Time: 30 minutes    Precautions:  Standard , HTN, DM     Date of Procedure: 11/12/2024   Post Op Day: day 6 as of 11/18  Procedure: Procedure(s) (LRB):  ROBOTIC ARTHROPLASTY, KNEE, TOTAL (Left)     Surgeons and Role:     * Sen Castro MD - Primary      SUBJECTIVE     Pt reports: knee is hurting today, the medication has worn off.    He was compliant with home exercise program.  Response to previous treatment: ongoing  Functional change: ongoing    Pain: 5/10  Location: left knee     OBJECTIVE     Objective Measures updated at progress report unless specified.     Flexion 95 degrees     Treatment       Willi received the treatments listed below:      Manual therapy techniques: Joint mobilizations were applied to the: left knee for 0 minutes, including:    Therapeutic exercises to develop strength, endurance, ROM, flexibility, posture, and core stabilization for 10 minutes including:    Heel prop 5'   Heel slides x 30    Neuromuscular re-education activities to improve: Balance, Coordination, Kinesthetic, Sense, Proprioception, and Posture for 12 minutes. The following activities were included:    Quad sets 5" x 30  Straight leg raise 3 x 10   Short Arc Quad x 30 "   Long arc quad x 30    Therapeutic activities to improve functional performance for 23 minutes, including:    Nustep level 1 for activity tolerance 10'  Sit to stands 2 x 10      Patient Education and Home Exercises     Home Exercises Provided and Patient Education Provided     Education provided:   - Home Exercise Program     Written Home Exercises Provided: Patient instructed to cont prior HEP. Exercises were reviewed and Willi was able to demonstrate them prior to the end of the session.  Willi demonstrated good  understanding of the education provided. See EMR under Patient Instructions for exercises provided during therapy sessions    ASSESSMENT     Willi presented with improved range of motion. Lower Extremity swelling noted. Interventions progressed to include sit to stands and more quad interventions. He did well. Will continue to progress as able.     Willi is progressing well towards his goals.     Pt prognosis is Excellent.     Pt will continue to benefit from skilled outpatient physical therapy to address the deficits listed in the problem list box on initial evaluation, provide pt/family education and to maximize pt's level of independence in the home and community environment.     Pt's spiritual, cultural and educational needs considered and pt agreeable to plan of care and goals.     Anticipated barriers to physical therapy: age, co-morbidities     Goals:   Short Term Goals: (3 weeks) -Progressing, not met   1. Patient will be independent with Home Exercise Program in order to supplement patient in improving functional mobility.   2. Patient will improve left knee AROM to at least 0-90 in order to improve gait.   3. Patient will improve hip flexor/quadriceps mobility to WNL in order to improve hip/knee AROM and improved gait function   4. Pt will improve hip abduction strength to 4+/5 to improve functional gait deviation.      Long Term Goals: (6 weeks) -Progressing, not met   1. Patient will  be independent with updated HEP supplement PT in improving functional mobility.   2. Patient will improve left knee AROM to at least 0-120 in order to improve gait and ability to perform ADLs.   3. Patient will improve FOTO knee survey score to predicted level in order to demo improved functional mobility.   4. Patient will perform TUG in < 12 seconds without AD in order to decrease risk of falling   5. Patient will perform at least 10 sit to stands without UE support on 30 second sit to stand test in order to demo improved ability to perform transfers.   6. Patient goal: get back to normal     PLAN   Plan of care Certification: 11/13/2024 to 12/25/2024.     Progress as tolerated per TKA protocol.     Merline Albarado, PT , DPT   Board Certified Clinical Specialist in Orthopedic Physical Therapy

## 2024-11-21 DIAGNOSIS — Z96.652 S/P TOTAL KNEE REPLACEMENT, LEFT: Primary | ICD-10-CM

## 2024-11-25 ENCOUNTER — OFFICE VISIT (OUTPATIENT)
Dept: ORTHOPEDICS | Facility: CLINIC | Age: 69
End: 2024-11-25
Payer: MEDICARE

## 2024-11-25 ENCOUNTER — HOSPITAL ENCOUNTER (OUTPATIENT)
Dept: RADIOLOGY | Facility: HOSPITAL | Age: 69
Discharge: HOME | End: 2024-11-25
Attending: ORTHOPAEDIC SURGERY
Payer: MEDICARE

## 2024-11-25 VITALS — RESPIRATION RATE: 18 BRPM | HEIGHT: 75 IN | WEIGHT: 233 LBS | BODY MASS INDEX: 28.97 KG/M2

## 2024-11-25 DIAGNOSIS — Z96.652 S/P TOTAL KNEE REPLACEMENT, LEFT: Primary | ICD-10-CM

## 2024-11-25 DIAGNOSIS — Z96.652 S/P TOTAL KNEE REPLACEMENT, LEFT: ICD-10-CM

## 2024-11-25 PROCEDURE — 1159F MED LIST DOCD IN RCRD: CPT | Mod: CPTII,S$GLB,, | Performed by: ORTHOPAEDIC SURGERY

## 2024-11-25 PROCEDURE — 3288F FALL RISK ASSESSMENT DOCD: CPT | Mod: CPTII,S$GLB,, | Performed by: ORTHOPAEDIC SURGERY

## 2024-11-25 PROCEDURE — 73560 X-RAY EXAM OF KNEE 1 OR 2: CPT | Mod: TC,PO,LT

## 2024-11-25 PROCEDURE — 73560 X-RAY EXAM OF KNEE 1 OR 2: CPT | Mod: 26,LT,, | Performed by: RADIOLOGY

## 2024-11-25 PROCEDURE — 1160F RVW MEDS BY RX/DR IN RCRD: CPT | Mod: CPTII,S$GLB,, | Performed by: ORTHOPAEDIC SURGERY

## 2024-11-25 PROCEDURE — 99999 PR PBB SHADOW E&M-EST. PATIENT-LVL III: CPT | Mod: PBBFAC,,, | Performed by: ORTHOPAEDIC SURGERY

## 2024-11-25 PROCEDURE — 1125F AMNT PAIN NOTED PAIN PRSNT: CPT | Mod: CPTII,S$GLB,, | Performed by: ORTHOPAEDIC SURGERY

## 2024-11-25 PROCEDURE — 1101F PT FALLS ASSESS-DOCD LE1/YR: CPT | Mod: CPTII,S$GLB,, | Performed by: ORTHOPAEDIC SURGERY

## 2024-11-25 PROCEDURE — 99024 POSTOP FOLLOW-UP VISIT: CPT | Mod: S$GLB,,, | Performed by: ORTHOPAEDIC SURGERY

## 2024-11-25 NOTE — PROGRESS NOTES
Past Medical History:   Diagnosis Date    Anemia due to chronic blood loss 10/16/2022    Anemia, unspecified 10/16/2022    Anticoagulant long-term use     Arthritis     Diabetes mellitus     type 2    Encounter for blood transfusion     GI bleed     Gout, unspecified     Hypertension     Iron deficiency anemia 10/16/2022    Normochromic normocytic anemia 10/16/2022    Paroxysmal atrial fibrillation 2022       Past Surgical History:   Procedure Laterality Date    COLONOSCOPY N/A 09/21/2022    Procedure: COLONOSCOPY;  Surgeon: Naseem Suh MD;  Location: [facility];  Service: Endoscopy;  Laterality: N/A;    ESOPHAGOGASTRODUODENOSCOPY N/A 07/27/2022    Procedure: EGD (ESOPHAGOGASTRODUODENOSCOPY);  Surgeon: Naseem Suh MD;  Location: [facility];  Service: Endoscopy;  Laterality: N/A;    ESOPHAGOGASTRODUODENOSCOPY N/A 09/21/2022    Procedure: EGD (ESOPHAGOGASTRODUODENOSCOPY);  Surgeon: Naseem Suh MD;  Location: [facility];  Service: Endoscopy;  Laterality: N/A;    ROBOTIC ARTHROPLASTY, KNEE Left 11/12/2024    Procedure: ROBOTIC ARTHROPLASTY, KNEE, TOTAL;  Surgeon: Sen Castro MD;  Location: [facility];  Service: Orthopedics;  Laterality: Left;  kezia    watchman      12/2024       Current Outpatient Medications   Medication Sig    acetaminophen (TYLENOL) 500 MG tablet Take 2 tablets (1,000 mg total) by mouth every 8 (eight) hours as needed for Pain.    allopurinoL (ZYLOPRIM) 300 MG tablet Take 300 mg by mouth once daily.    aspirin (ECOTRIN) 81 MG EC tablet Take 1 tablet (81 mg total) by mouth 2 (two) times daily.    aspirin 81 MG Chew Take 81 mg by mouth once daily.    colchicine (COLCRYS) 0.6 mg tablet Take by mouth.    diltiaZEM (CARDIZEM CD) 180 MG 24 hr capsule Take 180 mg by mouth once daily.    docosahexaenoic acid/epa (FISH OIL ORAL)     gabapentin (NEURONTIN) 600 MG tablet Take 600 mg by mouth 3 (three) times daily.    ibuprofen (ADVIL,MOTRIN) 600 MG tablet Take 1 tablet (600 mg  total) by mouth every 6 (six) hours as needed for Pain.    iron-vitamin C 100-250 mg, ICAR-C, (ICAR-C) 100-250 mg Tab Take 1 tablet by mouth once daily.    losartan-hydrochlorothiazide 50-12.5 mg (HYZAAR) 50-12.5 mg per tablet Take 1 tablet by mouth once daily. BID    meloxicam (MOBIC) 7.5 MG tablet meloxicam 7.5 mg tablet    metFORMIN (GLUCOPHAGE) 1000 MG tablet metformin 1,000 mg tablet    multivitamin-iron-folic acid (CENTRUM COMPLETE) Tab 1 tablet    ondansetron (ZOFRAN) 4 MG tablet Take 1 tablet (4 mg total) by mouth every 6 (six) hours as needed for Nausea.    oxyCODONE-acetaminophen (PERCOCET) 5-325 mg per tablet Take 1 tablet by mouth every 6 (six) hours as needed for Pain.    rosuvastatin (CRESTOR) 10 MG tablet Take 10 mg by mouth every evening.    sildenafiL (VIAGRA) 25 MG tablet Take 25 mg by mouth daily as needed.    sucralfate (CARAFATE) 1 gram tablet TAKE 1 TABLET BY MOUTH TWICE A DAY ON EMPTY STOMACH     No current facility-administered medications for this visit.       Review of patient's allergies indicates:   Allergen Reactions    Ace inhibitors Other (See Comments) and Swelling    Pcn [penicillins] Itching and Swelling    Statins-hmg-coa reductase inhibitors Other (See Comments)     Throat swelling       Family History   Problem Relation Name Age of Onset    Aneurysm Mother      No Known Problems Father         Social History     Socioeconomic History    Marital status:    Tobacco Use    Smoking status: Former     Types: Cigarettes    Smokeless tobacco: Never    Tobacco comments:     Started in 1975 1- 1.5 pack a day until 1999   Substance and Sexual Activity    Alcohol use: Yes     Alcohol/week: 30.0 standard drinks of alcohol     Types: 30 Cans of beer per week     Comment: daily    Drug use: Never     Social Drivers of Health     Financial Resource Strain: Medium Risk (8/20/2024)    Overall Financial Resource Strain (CARDIA)     Difficulty of Paying Living Expenses: Somewhat hard   Food  Insecurity: Food Insecurity Present (8/20/2024)    Hunger Vital Sign     Worried About Running Out of Food in the Last Year: Sometimes true     Ran Out of Food in the Last Year: Never true   Physical Activity: Unknown (8/20/2024)    Exercise Vital Sign     Days of Exercise per Week: 4 days   Stress: No Stress Concern Present (8/20/2024)    [facility] - Occupational Stress Questionnaire     Feeling of Stress : Not at all   Housing Stability: Unknown (8/20/2024)    Housing Stability Vital Sign     Unable to Pay for Housing in the Last Year: No       Chief Complaint:   Chief Complaint   Patient presents with    Post-op Evaluation     S/p left TKA. Dos 11/12/24       Date of surgery:  November 12, 2024    History of present illness:  69-year-old who underwent left robotic assisted total knee arthroplasty.  He is doing well.  Only trouble is with getting terminal extension and physical therapy.  Pain is a 5/10.  No wound issues.      Review of Systems:    Musculoskeletal:  See HPI        Physical Examination:    Vital Signs:    Vitals:    11/25/24 0904   Resp: 18       Body mass index is 29.13 kg/m².    This a well-developed, well nourished patient in no acute distress.  They are alert and oriented and cooperative to examination.  Pt. walks without an antalgic gait.      Examination left knee shows well-healing surgical incision.  No erythema drainage.  Mild swelling.  Patient has range of motion is about 2° to 110°.  No calf pain.    X-rays:  Two views left knee are ordered and reviewed which show well-aligned left total knee arthroplasty without complication     Assessment::  Status post left total knee arthroplasty    Plan:  I reviewed the findings with him today.  Continue the physical therapy.  Patient may get the incision wet.  Follow up in a month.    This note was created using [employer] voice recognition software that occasionally misinterpreted phrases or words.

## 2024-11-26 ENCOUNTER — CLINICAL SUPPORT (OUTPATIENT)
Dept: REHABILITATION | Facility: HOSPITAL | Age: 69
End: 2024-11-26
Payer: MEDICARE

## 2024-11-26 DIAGNOSIS — M25.60 RANGE OF MOTION DEFICIT: ICD-10-CM

## 2024-11-26 DIAGNOSIS — R26.9 GAIT ABNORMALITY: Primary | ICD-10-CM

## 2024-11-26 PROCEDURE — 97112 NEUROMUSCULAR REEDUCATION: CPT | Mod: PN

## 2024-11-26 PROCEDURE — 97530 THERAPEUTIC ACTIVITIES: CPT | Mod: PN

## 2024-11-26 PROCEDURE — 97110 THERAPEUTIC EXERCISES: CPT | Mod: PN

## 2024-11-26 NOTE — PROGRESS NOTES
[facility]   Physical Therapy Treatment Note     Name: Willi Leyva [facility] Number: [id number]    Therapy Diagnosis:   Encounter Diagnoses   Name Primary?    Gait abnormality Yes    Range of motion deficit      Physician: Sen Castro,*    Visit Date: 11/26/2024    Physician Orders: PT eval and treat  Medical Diagnosis from Referral:   Diagnosis   Z96.652 (ICD-10-CM) - S/P total knee replacement, left      Evaluation Date: 11/13/2024  Authorization Period Expiration: 12/31/2024   Plan of Care Expiration: 12/25/2024  Progress Note Due: 1/13/2024  Visit # / Visits authorized: 2/ 20  FOTO: 1/ 3      FOTO 1st: 59%  Predicted Score: 83%  FOTO 3rd:  FOTO 10th:    Time in: 900am  Time out: 945am  Total Billable Time: 45 minutes    Precautions:  Standard , HTN, DM     Date of Procedure: 11/12/2024   Post Op Day: 2 weeks as of 11/26  Procedure: Procedure(s) (LRB):  ROBOTIC ARTHROPLASTY, KNEE, TOTAL (Left)     Surgeons and Role:     * Sen Castro MD - Primary      SUBJECTIVE     Pt reports: saw the doctor and got his bandage off. Its healing well. Doctor cleared him to do what he wants. He hasn't been using the walker and [place] Very sore from that today.     He was compliant with home exercise program.  Response to previous treatment: ongoing  Functional change: ongoing    Pain: 5/10  Location: left knee     OBJECTIVE     Objective Measures updated at progress report unless specified.     Flexion 95 degrees     Treatment       Willi received the treatments listed below:      Manual therapy techniques: Joint mobilizations were applied to the: left knee for 0 minutes, including:    Therapeutic exercises to develop strength, endurance, ROM, flexibility, posture, and core stabilization for 15 minutes including:    Heel prop 5'   Heel slides x 30    Neuromuscular re-education activities to improve: Balance, Coordination, Kinesthetic, Sense, Proprioception, and  "Posture for 15 minutes. The following activities were included:    Quad sets 5" x 30  Straight leg raise 2 x 15  Bridges 3 x 10   Short Arc Quad x 30   Long arc quad 2 x 10, 3#    Therapeutic activities to improve functional performance for 15 minutes, including:    Nustep level 2 for activity tolerance 10'  Sit to stands 2 x 10      Patient Education and Home Exercises     Home Exercises Provided and Patient Education Provided     Education provided:   - Home Exercise Program     Written Home Exercises Provided: Patient instructed to cont prior HEP. Exercises were reviewed and Willi was able to demonstrate them prior to the end of the session.  Willi demonstrated good  understanding of the education provided. See EMR under Patient Instructions for exercises provided during therapy sessions    ASSESSMENT     Willi presented with improved range of motion. Interventions progressed with resistance. He did well. Will continue to progress as able.     Willi is progressing well towards his goals.     Pt prognosis is Excellent.     Pt will continue to benefit from skilled outpatient physical therapy to address the deficits listed in the problem list box on initial evaluation, provide pt/family education and to maximize pt's level of independence in the home and community environment.     Pt's spiritual, cultural and educational needs considered and pt agreeable to plan of care and goals.     Anticipated barriers to physical therapy: age, co-morbidities     Goals:   Short Term Goals: (3 weeks) -Progressing, not met   1. Patient will be independent with Home Exercise Program in order to supplement patient in improving functional mobility.   2. Patient will improve left knee AROM to at least 0-90 in order to improve gait.   3. Patient will improve hip flexor/quadriceps mobility to WNL in order to improve hip/knee AROM and improved gait function   4. Pt will improve hip abduction strength to 4+/5 to improve functional " gait deviation.      Long Term Goals: (6 weeks) -Progressing, not met   1. Patient will be independent with updated HEP supplement PT in improving functional mobility.   2. Patient will improve left knee AROM to at least 0-120 in order to improve gait and ability to perform ADLs.   3. Patient will improve FOTO knee survey score to predicted level in order to demo improved functional mobility.   4. Patient will perform TUG in < 12 seconds without AD in order to decrease risk of falling   5. Patient will perform at least 10 sit to stands without UE support on 30 second sit to stand test in order to demo improved ability to perform transfers.   6. Patient goal: get back to normal     PLAN   Plan of care Certification: 11/13/2024 to 12/25/2024.     Progress as tolerated per TKA protocol.     Merline Albarado, PT , DPT   Board Certified Clinical Specialist in Orthopedic Physical Therapy

## 2024-11-29 ENCOUNTER — CLINICAL SUPPORT (OUTPATIENT)
Dept: REHABILITATION | Facility: HOSPITAL | Age: 69
End: 2024-11-29
Payer: MEDICARE

## 2024-11-29 DIAGNOSIS — R26.9 GAIT ABNORMALITY: Primary | ICD-10-CM

## 2024-11-29 DIAGNOSIS — M25.60 RANGE OF MOTION DEFICIT: ICD-10-CM

## 2024-11-29 PROCEDURE — 97112 NEUROMUSCULAR REEDUCATION: CPT | Mod: PN,CQ

## 2024-11-29 PROCEDURE — 97110 THERAPEUTIC EXERCISES: CPT | Mod: PN,CQ

## 2024-11-29 PROCEDURE — 97530 THERAPEUTIC ACTIVITIES: CPT | Mod: PN,CQ

## 2024-11-29 NOTE — PROGRESS NOTES
"[facility]   Physical Therapy Treatment Note     Name: Willi Leyva [place]  Clinic Number: [id number]    Therapy Diagnosis:   Encounter Diagnoses   Name Primary?    Gait abnormality Yes    Range of motion deficit      Physician: Sen Castro,*    Visit Date: 11/29/2024    Physician Orders: PT eval and treat  Medical Diagnosis from Referral:   Diagnosis   Z96.652 (ICD-10-CM) - S/P total knee replacement, left      Evaluation Date: 11/13/2024  Authorization Period Expiration: 12/31/2024   Plan of Care Expiration: 12/25/2024  Progress Note Due: 1/13/2024  Visit # / Visits authorized: 3/ 20  FOTO: 1/ 3      FOTO 1st: 59%  Predicted Score: 83%  FOTO 3rd:  FOTO 10th:    Time in: 800am  Time out: 845am  Total Billable Time: 45 minutes    Precautions:  Standard , HTN, DM     Date of Procedure: 11/12/2024   Post Op Day: 2 weeks as of 11/26  Procedure: Procedure(s) (LRB):  ROBOTIC ARTHROPLASTY, KNEE, TOTAL (Left)     Surgeons and Role:     * Sen Castro MD - Primary      SUBJECTIVE     Pt reports: doing better.     He was compliant with home exercise program.  Response to previous treatment: soreness   Functional change: ongoing    Pain: 5/10  Location: left knee     OBJECTIVE     Objective Measures updated at progress report unless specified.     Flexion 95 degrees     Treatment       Willi received the treatments listed below:      Manual therapy techniques: Joint mobilizations were applied to the: left knee for 0 minutes, including:    Therapeutic exercises to develop strength, endurance, ROM, flexibility, posture, and core stabilization for 15 minutes including:    Heel prop 5'   Heel slides x 30    Neuromuscular re-education activities to improve: Balance, Coordination, Kinesthetic, Sense, Proprioception, and Posture for 15 minutes. The following activities were included:    Quad sets 5" x 30  Straight leg raise 2 x 15  Bridges 3 x 10   Short Arc Quad 3# x 30   Long arc " quad 2 x 10, 3#    Therapeutic activities to improve functional performance for 15 minutes, including:    Nustep level 2 for activity tolerance 10'  Sit to stands 2 x 10      Patient Education and Home Exercises     Home Exercises Provided and Patient Education Provided     Education provided:   - Home Exercise Program     Written Home Exercises Provided: Patient instructed to cont prior HEP. Exercises were reviewed and Willi was able to demonstrate them prior to the end of the session.  Willi demonstrated good  understanding of the education provided. See EMR under Patient Instructions for exercises provided during therapy sessions    ASSESSMENT     Willi presented with improved range of motion. Interventions progressed with resistance, swelling noted. Cramping noted with multiple exercises due to muscle imbalance. Will continue to progress as able.     Willi is progressing well towards his goals.     Pt prognosis is Excellent.     Pt will continue to benefit from skilled outpatient physical therapy to address the deficits listed in the problem list box on initial evaluation, provide pt/family education and to maximize pt's level of independence in the home and community environment.     Pt's spiritual, cultural and educational needs considered and pt agreeable to plan of care and goals.     Anticipated barriers to physical therapy: age, co-morbidities     Goals:   Short Term Goals: (3 weeks) -Progressing, not met   1. Patient will be independent with Home Exercise Program in order to supplement patient in improving functional mobility.   2. Patient will improve left knee AROM to at least 0-90 in order to improve gait.   3. Patient will improve hip flexor/quadriceps mobility to WNL in order to improve hip/knee AROM and improved gait function   4. Pt will improve hip abduction strength to 4+/5 to improve functional gait deviation.      Long Term Goals: (6 weeks) -Progressing, not met   1. Patient will be  independent with updated HEP supplement PT in improving functional mobility.   2. Patient will improve left knee AROM to at least 0-120 in order to improve gait and ability to perform ADLs.   3. Patient will improve FOTO knee survey score to predicted level in order to demo improved functional mobility.   4. Patient will perform TUG in < 12 seconds without AD in order to decrease risk of falling   5. Patient will perform at least 10 sit to stands without UE support on 30 second sit to stand test in order to demo improved ability to perform transfers.   6. Patient goal: get back to normal     PLAN   Plan of care Certification: 11/13/2024 to 12/25/2024.     Progress as tolerated per TKA protocol.     Frida Inman, PTA

## 2024-12-02 ENCOUNTER — CLINICAL SUPPORT (OUTPATIENT)
Dept: REHABILITATION | Facility: HOSPITAL | Age: 69
End: 2024-12-02
Payer: MEDICARE

## 2024-12-02 DIAGNOSIS — M25.60 RANGE OF MOTION DEFICIT: ICD-10-CM

## 2024-12-02 DIAGNOSIS — R26.9 GAIT ABNORMALITY: Primary | ICD-10-CM

## 2024-12-02 PROCEDURE — 97530 THERAPEUTIC ACTIVITIES: CPT | Mod: KX,PN,CQ

## 2024-12-02 PROCEDURE — 97112 NEUROMUSCULAR REEDUCATION: CPT | Mod: KX,PN,CQ

## 2024-12-02 NOTE — PROGRESS NOTES
"[facility]   Physical Therapy Treatment Note     Name: Willi Leyva [place]  Clinic Number: [id number]    Therapy Diagnosis:   Encounter Diagnoses   Name Primary?    Gait abnormality Yes    Range of motion deficit      Physician: Sen Castro,*    Visit Date: 12/2/2024    Physician Orders: PT eval and treat  Medical Diagnosis from Referral:   Diagnosis   Z96.652 (ICD-10-CM) - S/P total knee replacement, left      Evaluation Date: 11/13/2024  Authorization Period Expiration: 12/31/2024   Plan of Care Expiration: 12/25/2024  Progress Note Due: 1/13/2024  Visit # / Visits authorized: 3/ 20  FOTO: 1/ 3      FOTO 1st: 59%  Predicted Score: 83%  FOTO 3rd:  FOTO 10th:    Time in: 800am  Time out: 845am  Total Billable Time: 45 minutes    Precautions:  Standard , HTN, DM     Date of Procedure: 11/12/2024   Post Op Day: 2 weeks as of 11/26  Procedure: Procedure(s) (LRB):  ROBOTIC ARTHROPLASTY, KNEE, TOTAL (Left)     Surgeons and Role:     * Sen Castro MD - Primary      SUBJECTIVE     Pt reports: knee has been bothering him since he had to get something off his floor.     He was compliant with home exercise program.  Response to previous treatment: soreness   Functional change: ongoing    Pain: 5/10  Location: left knee     OBJECTIVE     Objective Measures updated at progress report unless specified.     Flexion 95 degrees     Treatment       Willi received the treatments listed below:      Manual therapy techniques: Joint mobilizations were applied to the: left knee for 0 minutes, including:    Therapeutic exercises to develop strength, endurance, ROM, flexibility, posture, and core stabilization for 15 minutes including:    Heel prop 5'   Heel slides x 30    Neuromuscular re-education activities to improve: Balance, Coordination, Kinesthetic, Sense, Proprioception, and Posture for 15 minutes. The following activities were included:    Quad sets 5" x 30  Straight leg raise 2 x " 15  Bridges 3 x 10   Short Arc Quad 3# x 30   Long arc quad 2 x 10, 3#    Therapeutic activities to improve functional performance for 15 minutes, including:    Nustep level 2 for activity tolerance 10'  Sit to stands, airex 2 x 10      Patient Education and Home Exercises     Home Exercises Provided and Patient Education Provided     Education provided:   - Home Exercise Program     Written Home Exercises Provided: Patient instructed to cont prior HEP. Exercises were reviewed and Willi was able to demonstrate them prior to the end of the session.  Willi demonstrated good  understanding of the education provided. See EMR under Patient Instructions for exercises provided during therapy sessions    ASSESSMENT     Willi presented with improved range of motion. Patient continues to have discomfort and swelling limiting quad facilitation and range of motion. Will continue to progress as able.     Willi is progressing well towards his goals.     Pt prognosis is Excellent.     Pt will continue to benefit from skilled outpatient physical therapy to address the deficits listed in the problem list box on initial evaluation, provide pt/family education and to maximize pt's level of independence in the home and community environment.     Pt's spiritual, cultural and educational needs considered and pt agreeable to plan of care and goals.     Anticipated barriers to physical therapy: age, co-morbidities     Goals:   Short Term Goals: (3 weeks) -Progressing, not met   1. Patient will be independent with Home Exercise Program in order to supplement patient in improving functional mobility.   2. Patient will improve left knee AROM to at least 0-90 in order to improve gait.   3. Patient will improve hip flexor/quadriceps mobility to WNL in order to improve hip/knee AROM and improved gait function   4. Pt will improve hip abduction strength to 4+/5 to improve functional gait deviation.      Long Term Goals: (6 weeks)  -Progressing, not met   1. Patient will be independent with updated HEP supplement PT in improving functional mobility.   2. Patient will improve left knee AROM to at least 0-120 in order to improve gait and ability to perform ADLs.   3. Patient will improve FOTO knee survey score to predicted level in order to demo improved functional mobility.   4. Patient will perform TUG in < 12 seconds without AD in order to decrease risk of falling   5. Patient will perform at least 10 sit to stands without UE support on 30 second sit to stand test in order to demo improved ability to perform transfers.   6. Patient goal: get back to normal     PLAN   Plan of care Certification: 11/13/2024 to 12/25/2024.     Progress as tolerated per TKA protocol.     Frida Inman, PTA

## 2024-12-05 ENCOUNTER — CLINICAL SUPPORT (OUTPATIENT)
Dept: REHABILITATION | Facility: HOSPITAL | Age: 69
End: 2024-12-05
Payer: MEDICARE

## 2024-12-05 DIAGNOSIS — R26.9 GAIT ABNORMALITY: Primary | ICD-10-CM

## 2024-12-05 DIAGNOSIS — M25.60 RANGE OF MOTION DEFICIT: ICD-10-CM

## 2024-12-05 PROCEDURE — 97530 THERAPEUTIC ACTIVITIES: CPT | Mod: KX,PN,CQ

## 2024-12-05 PROCEDURE — 97112 NEUROMUSCULAR REEDUCATION: CPT | Mod: KX,PN,CQ

## 2024-12-05 PROCEDURE — 97110 THERAPEUTIC EXERCISES: CPT | Mod: KX,PN,CQ

## 2024-12-05 NOTE — PROGRESS NOTES
"[facility]   Physical Therapy Treatment Note     Name: Willi Leyva [place]  Clinic Number: [id number]    Therapy Diagnosis:   Encounter Diagnoses   Name Primary?    Gait abnormality Yes    Range of motion deficit      Physician: Sen Castro,*    Visit Date: 12/5/2024    Physician Orders: PT eval and treat  Medical Diagnosis from Referral:   Diagnosis   Z96.652 (ICD-10-CM) - S/P total knee replacement, left      Evaluation Date: 11/13/2024  Authorization Period Expiration: 12/31/2024   Plan of Care Expiration: 12/25/2024  Progress Note Due: 1/13/2024  Visit # / Visits authorized: 4/ 20  FOTO: 1/ 3      FOTO 1st: 59%  Predicted Score: 83%  FOTO 3rd:  FOTO 10th:    Time in: 900am  Time out: 945am  Total Billable Time: 45 minutes    Precautions:  Standard , HTN, DM     Date of Procedure: 11/12/2024   Post Op Day: 2 weeks as of 11/26  Procedure: Procedure(s) (LRB):  ROBOTIC ARTHROPLASTY, KNEE, TOTAL (Left)     Surgeons and Role:     * Sen Castro MD - Primary      SUBJECTIVE     Pt reports: the sides of his knee are still very tender.   He was compliant with home exercise program.  Response to previous treatment: soreness   Functional change: ongoing    Pain: 5/10  Location: left knee     OBJECTIVE     Objective Measures updated at progress report unless specified.     Flexion 95 degrees     Treatment       Willi received the treatments listed below:      Manual therapy techniques: Joint mobilizations were applied to the: left knee for 0 minutes, including:    Therapeutic exercises to develop strength, endurance, ROM, flexibility, posture, and core stabilization for 15 minutes including:    Heel prop 6'   Heel slides x 30    Neuromuscular re-education activities to improve: Balance, Coordination, Kinesthetic, Sense, Proprioception, and Posture for 15 minutes. The following activities were included:    Quad sets 5" x 30  Straight leg raise 2 x 15  Bridges 3 x 10   Short Arc " Quad 5# x 30   Long arc quad 3 x 10, 4#    Therapeutic activities to improve functional performance for 15 minutes, including:    Nustep level 2 for activity tolerance 10'  Sit to stands, airex 2 x 10      Patient Education and Home Exercises     Home Exercises Provided and Patient Education Provided     Education provided:   - Home Exercise Program     Written Home Exercises Provided: Patient instructed to cont prior HEP. Exercises were reviewed and Willi was able to demonstrate them prior to the end of the session.  Willi demonstrated good  understanding of the education provided. See EMR under Patient Instructions for exercises provided during therapy sessions    ASSESSMENT     Willi presented with improved range of motion. Patient continues to have discomfort and swelling limiting quad facilitation and range of motion. Will continue to progress as able.     Willi is progressing well towards his goals.     Pt prognosis is Excellent.     Pt will continue to benefit from skilled outpatient physical therapy to address the deficits listed in the problem list box on initial evaluation, provide pt/family education and to maximize pt's level of independence in the home and community environment.     Pt's spiritual, cultural and educational needs considered and pt agreeable to plan of care and goals.     Anticipated barriers to physical therapy: age, co-morbidities     Goals:   Short Term Goals: (3 weeks) -Progressing, not met   1. Patient will be independent with Home Exercise Program in order to supplement patient in improving functional mobility.   2. Patient will improve left knee AROM to at least 0-90 in order to improve gait.   3. Patient will improve hip flexor/quadriceps mobility to WNL in order to improve hip/knee AROM and improved gait function   4. Pt will improve hip abduction strength to 4+/5 to improve functional gait deviation.      Long Term Goals: (6 weeks) -Progressing, not met   1. Patient will  be independent with updated HEP supplement PT in improving functional mobility.   2. Patient will improve left knee AROM to at least 0-120 in order to improve gait and ability to perform ADLs.   3. Patient will improve FOTO knee survey score to predicted level in order to demo improved functional mobility.   4. Patient will perform TUG in < 12 seconds without AD in order to decrease risk of falling   5. Patient will perform at least 10 sit to stands without UE support on 30 second sit to stand test in order to demo improved ability to perform transfers.   6. Patient goal: get back to normal     PLAN   Plan of care Certification: 11/13/2024 to 12/25/2024.     Progress as tolerated per TKA protocol.     Frida Inman, PTA

## 2024-12-09 ENCOUNTER — CLINICAL SUPPORT (OUTPATIENT)
Dept: REHABILITATION | Facility: HOSPITAL | Age: 69
End: 2024-12-09
Payer: MEDICARE

## 2024-12-09 DIAGNOSIS — R26.9 GAIT ABNORMALITY: Primary | ICD-10-CM

## 2024-12-09 DIAGNOSIS — M25.60 RANGE OF MOTION DEFICIT: ICD-10-CM

## 2024-12-09 PROCEDURE — 97530 THERAPEUTIC ACTIVITIES: CPT | Mod: KX,PN,CQ

## 2024-12-09 PROCEDURE — 97112 NEUROMUSCULAR REEDUCATION: CPT | Mod: KX,PN,CQ

## 2024-12-09 NOTE — PROGRESS NOTES
"[facility]   Physical Therapy Treatment Note     Name: Willi Leyva [place]  Clinic Number: [id number]    Therapy Diagnosis:   Encounter Diagnoses   Name Primary?    Gait abnormality Yes    Range of motion deficit      Physician: Sen Castro,*    Visit Date: 12/9/2024    Physician Orders: PT eval and treat  Medical Diagnosis from Referral:   Diagnosis   Z96.652 (ICD-10-CM) - S/P total knee replacement, left      Evaluation Date: 11/13/2024  Authorization Period Expiration: 12/31/2024   Plan of Care Expiration: 12/25/2024  Progress Note Due: 1/13/2024  Visit # / Visits authorized: 4/ 20  FOTO: 1/ 3      FOTO 1st: 59%  Predicted Score: 83%  FOTO 3rd:  FOTO 10th:    Time in: 900am  Time out: 945am  Total Billable Time: 30 minutes    Precautions:  Standard , HTN, DM     Date of Procedure: 11/12/2024   Post Op Day: 2 weeks as of 11/26  Procedure: Procedure(s) (LRB):  ROBOTIC ARTHROPLASTY, KNEE, TOTAL (Left)     Surgeons and Role:     * Sen Castro MD - Primary      SUBJECTIVE     Pt reports: doing well, tender to lateral side of knee to touch.   He was compliant with home exercise program.  Response to previous treatment: soreness   Functional change: ongoing    Pain: 5/10  Location: left knee     OBJECTIVE     Objective Measures updated at progress report unless specified.     Flexion 95 degrees     Treatment       Willi received the treatments listed below:      Manual therapy techniques: Joint mobilizations were applied to the: left knee for 0 minutes, including:    Therapeutic exercises to develop strength, endurance, ROM, flexibility, posture, and core stabilization for 15 minutes including:    Heel prop 6'   Heel slides x 30    Neuromuscular re-education activities to improve: Balance, Coordination, Kinesthetic, Sense, Proprioception, and Posture for 15 minutes. The following activities were included:    Quad sets 5" x 30  Straight leg raise 2 x 15  Bridges 3 x 10 "   Short Arc Quad 5# x 30   Long arc quad 3 x 10, 4#    Therapeutic activities to improve functional performance for 15 minutes, including:    Nustep level 2 for activity tolerance 10'  Sit to stands, airex 2 x 10      Patient Education and Home Exercises     Home Exercises Provided and Patient Education Provided     Education provided:   - Home Exercise Program     Written Home Exercises Provided: Patient instructed to cont prior HEP. Exercises were reviewed and Willi was able to demonstrate them prior to the end of the session.  Willi demonstrated good  understanding of the education provided. See EMR under Patient Instructions for exercises provided during therapy sessions    ASSESSMENT     Willi presented with improved range of motion and tolerance. Patient continues to have discomfort and swelling limiting quad facilitation and range of motion. Will continue to progress as able.     Willi is progressing well towards his goals.     Pt prognosis is Excellent.     Pt will continue to benefit from skilled outpatient physical therapy to address the deficits listed in the problem list box on initial evaluation, provide pt/family education and to maximize pt's level of independence in the home and community environment.     Pt's spiritual, cultural and educational needs considered and pt agreeable to plan of care and goals.     Anticipated barriers to physical therapy: age, co-morbidities     Goals:   Short Term Goals: (3 weeks) -Progressing, not met   1. Patient will be independent with Home Exercise Program in order to supplement patient in improving functional mobility.   2. Patient will improve left knee AROM to at least 0-90 in order to improve gait.   3. Patient will improve hip flexor/quadriceps mobility to WNL in order to improve hip/knee AROM and improved gait function   4. Pt will improve hip abduction strength to 4+/5 to improve functional gait deviation.      Long Term Goals: (6 weeks) -Progressing,  not met   1. Patient will be independent with updated HEP supplement PT in improving functional mobility.   2. Patient will improve left knee AROM to at least 0-120 in order to improve gait and ability to perform ADLs.   3. Patient will improve FOTO knee survey score to predicted level in order to demo improved functional mobility.   4. Patient will perform TUG in < 12 seconds without AD in order to decrease risk of falling   5. Patient will perform at least 10 sit to stands without UE support on 30 second sit to stand test in order to demo improved ability to perform transfers.   6. Patient goal: get back to normal     PLAN   Plan of care Certification: 11/13/2024 to 12/25/2024.     Progress as tolerated per TKA protocol.     Frida Inman, PTA

## 2024-12-12 ENCOUNTER — CLINICAL SUPPORT (OUTPATIENT)
Dept: REHABILITATION | Facility: HOSPITAL | Age: 69
End: 2024-12-12
Payer: MEDICARE

## 2024-12-12 DIAGNOSIS — R26.9 GAIT ABNORMALITY: Primary | ICD-10-CM

## 2024-12-12 DIAGNOSIS — M25.60 RANGE OF MOTION DEFICIT: ICD-10-CM

## 2024-12-12 PROCEDURE — 97112 NEUROMUSCULAR REEDUCATION: CPT | Mod: KX,PN,CQ

## 2024-12-12 PROCEDURE — 97530 THERAPEUTIC ACTIVITIES: CPT | Mod: KX,PN,CQ

## 2024-12-12 NOTE — PROGRESS NOTES
[facility]   Physical Therapy Treatment Note     Name: Willi Leyva [place]  Clinic Number: [id number]    Therapy Diagnosis:   Encounter Diagnoses   Name Primary?    Gait abnormality Yes    Range of motion deficit      Physician: Sen Castro,*    Visit Date: 12/12/2024    Physician Orders: PT eval and treat  Medical Diagnosis from Referral:   Diagnosis   Z96.652 (ICD-10-CM) - S/P total knee replacement, left      Evaluation Date: 11/13/2024  Authorization Period Expiration: 12/31/2024   Plan of Care Expiration: 12/25/2024  Progress Note Due: 1/13/2024  Visit # / Visits authorized: 7 / 20  FOTO: 1/ 3      FOTO 1st: 59%  Predicted Score: 83%  FOTO 3rd:  FOTO 10th:    Time in: 0903   FOTO next visit  Time out: 0956  Total Billable Time: 25 minutes    Precautions:  Standard , HTN, DM     Date of Procedure: 11/12/2024   Post Op Day: 2 weeks as of 11/26  Procedure: Procedure(s) (LRB):  ROBOTIC ARTHROPLASTY, KNEE, TOTAL (Left)     Surgeons and Role:     * Sen Castro MD - Primary      SUBJECTIVE     Pt reports: difficulty with step through pattern and heel strike  He was compliant with home exercise program.  Response to previous treatment: no issues stated   Functional change: ongoing    Pain: 5/10  Location: left knee     OBJECTIVE     Objective Measures updated at progress report unless specified.     Flexion 95 degrees     Treatment       Willi received the treatments listed below:      Manual therapy techniques: Joint mobilizations were applied to the: left knee for 2 minutes, including:    Patellar mobs  Passive knee extension  Soft Tissue Mobilization posterior knee region    Therapeutic exercises to develop strength, endurance, ROM, flexibility, posture, and core stabilization for 8 minutes including:    Heel prop 4# x 4 minutes   Heel slides x 30    Neuromuscular re-education activities to improve: Balance, Coordination, Kinesthetic, Sense, Proprioception, and  "Posture for 23 minutes. The following activities were included:    Quad sets 5" x 30  NP  Straight leg raise 2# 2 x 15  Bridges 3 x 10  NP  Short Arc Quad 2# x 30   Long arc quad 3 x 10 4#    Slant board Gastroc stretch 3 x 30 sec Bilateral  Knee flexion stretch on step x 8    Therapeutic activities to improve functional performance for 20 minutes, including:    Nustep level 2 for activity tolerance 10' NP  Sit to stands, airex 2 x 10 NP    Step up 6" with eccentric control x 20      Patient Education and Home Exercises     Home Exercises Provided and Patient Education Provided     Education provided:   - Home Exercise Program     Written Home Exercises Provided: Patient instructed to cont prior HEP. Exercises were reviewed and Willi was able to demonstrate them prior to the end of the session.  Willi demonstrated good  understanding of the education provided. See EMR under Patient Instructions for exercises provided during therapy sessions    ASSESSMENT     Willi arrived to therapy reporting pain in posterior knee region, pain reported with extension stretching.  Increased knee extension and improved tolerance to passive extension stretching with improvement in gait pattern by conclusion of therapy.     Willi is progressing well towards his goals.     Pt prognosis is Excellent.     Pt will continue to benefit from skilled outpatient physical therapy to address the deficits listed in the problem list box on initial evaluation, provide pt/family education and to maximize pt's level of independence in the home and community environment.     Pt's spiritual, cultural and educational needs considered and pt agreeable to plan of care and goals.     Anticipated barriers to physical therapy: age, co-morbidities     Goals:   Short Term Goals: (3 weeks) -Progressing, not met   1. Patient will be independent with Home Exercise Program in order to supplement patient in improving functional mobility.   2. Patient will " improve left knee AROM to at least 0-90 in order to improve gait.   3. Patient will improve hip flexor/quadriceps mobility to WNL in order to improve hip/knee AROM and improved gait function   4. Pt will improve hip abduction strength to 4+/5 to improve functional gait deviation.      Long Term Goals: (6 weeks) -Progressing, not met   1. Patient will be independent with updated HEP supplement PT in improving functional mobility.   2. Patient will improve left knee AROM to at least 0-120 in order to improve gait and ability to perform ADLs.   3. Patient will improve FOTO knee survey score to predicted level in order to demo improved functional mobility.   4. Patient will perform TUG in < 12 seconds without AD in order to decrease risk of falling   5. Patient will perform at least 10 sit to stands without UE support on 30 second sit to stand test in order to demo improved ability to perform transfers.   6. Patient goal: get back to normal     PLAN   Plan of care Certification: 11/13/2024 to 12/25/2024.     Progress as tolerated per TKA protocol.     Krysta Ramirez, PTA

## 2024-12-13 ENCOUNTER — DOCUMENTATION ONLY (OUTPATIENT)
Dept: REHABILITATION | Facility: HOSPITAL | Age: 69
End: 2024-12-13
Payer: MEDICARE

## 2024-12-13 NOTE — PROGRESS NOTES
PT/PTA met face to face to discuss pt's treatment plan and progress towards established goals. Pt will be seen by a physical therapist minimally every 6th visit or every 30 days.    Krysta Ramirez PTA

## 2024-12-18 ENCOUNTER — CLINICAL SUPPORT (OUTPATIENT)
Dept: REHABILITATION | Facility: HOSPITAL | Age: 69
End: 2024-12-18
Payer: MEDICARE

## 2024-12-18 DIAGNOSIS — M25.60 RANGE OF MOTION DEFICIT: ICD-10-CM

## 2024-12-18 DIAGNOSIS — R26.9 GAIT ABNORMALITY: Primary | ICD-10-CM

## 2024-12-18 PROCEDURE — 97112 NEUROMUSCULAR REEDUCATION: CPT | Mod: KX,PN

## 2024-12-18 PROCEDURE — 97530 THERAPEUTIC ACTIVITIES: CPT | Mod: KX,PN

## 2024-12-18 NOTE — PROGRESS NOTES
"[facility]   Physical Therapy Treatment Note     Name: Willi Leyva [place]  Clinic Number: [id number]    Therapy Diagnosis:   Encounter Diagnoses   Name Primary?    Gait abnormality Yes    Range of motion deficit      Physician: Sen Castro,*    Visit Date: 12/18/2024    Physician Orders: PT eval and treat  Medical Diagnosis from Referral:   Diagnosis   Z96.652 (ICD-10-CM) - S/P total knee replacement, left      Evaluation Date: 11/13/2024  Authorization Period Expiration: 12/31/2024   Plan of Care Expiration: 12/25/2024  Progress Note Due: 1/13/2024  Visit # / Visits authorized: 7 / 20  FOTO: 1/ 3      FOTO 1st: 59%  Predicted Score: 83%  FOTO 3rd:  FOTO 10th:    Time in: 0903   FOTO next visit  Time out: 0956  Total Billable Time: 25 minutes    Precautions:  Standard , HTN, DM     Date of Procedure: 11/12/2024   Post Op Day: 2 weeks as of 11/26  Procedure: Procedure(s) (LRB):  ROBOTIC ARTHROPLASTY, KNEE, TOTAL (Left)     Surgeons and Role:     * Sen Castro MD - Primary      SUBJECTIVE     Pt reports: no complaints.  He was compliant with home exercise program.  Response to previous treatment: no issues stated   Functional change: ongoing    Pain: 5/10  Location: left knee     OBJECTIVE     Objective Measures updated at progress report unless specified.     Flexion 95 degrees     Treatment       Willi received the treatments listed below:      Manual therapy techniques: Joint mobilizations were applied to the: left knee for 0 minutes, including:    Patellar mobs  Passive knee extension  Soft Tissue Mobilization posterior knee region    Neuromuscular re-education activities to improve: Balance, Coordination, Kinesthetic, Sense, Proprioception, and Posture for 35 minutes. The following activities were included:    Nustep level 2 for activity tolerance 10'     Heel prop 4# x 4 minutes   Heel slides x 30  Quad sets 5" x 30  NP  Straight leg raise 2# 2 x 15  Bridges 3 x " "10  NP  Short Arc Quad 2# x 30   Long arc quad 3 x 10 4#    Slant board Gastroc stretch 3 x 30 sec Bilateral  Knee flexion stretch on step x 8    Therapeutic activities to improve functional performance for 20 minutes, including:    Sit to stands, airex 2 x 10     Shuttle  DL  50#  3x10  Shuttle SL 25#  3x10  Precor knee extension 30# 3 x 10   Sled push/pull 20# 10 yards x 3 laps     Step up 6" with eccentric control x 20      Patient Education and Home Exercises     Home Exercises Provided and Patient Education Provided     Education provided:   - Home Exercise Program     Written Home Exercises Provided: Patient instructed to cont prior HEP. Exercises were reviewed and Willi was able to demonstrate them prior to the end of the session.  Willi demonstrated good  understanding of the education provided. See EMR under Patient Instructions for exercises provided during therapy sessions    ASSESSMENT     Willi arrived to therapy reporting pain in posterior knee region, pain reported with extension stretching.  Increased knee extension and improved tolerance to passive extension stretching with improvement in gait pattern by conclusion of therapy.     Willi is progressing well towards his goals.     Pt prognosis is Excellent.     Pt will continue to benefit from skilled outpatient physical therapy to address the deficits listed in the problem list box on initial evaluation, provide pt/family education and to maximize pt's level of independence in the home and community environment.     Pt's spiritual, cultural and educational needs considered and pt agreeable to plan of care and goals.     Anticipated barriers to physical therapy: age, co-morbidities     Goals:   Short Term Goals: (3 weeks) -Progressing, not met   1. Patient will be independent with Home Exercise Program in order to supplement patient in improving functional mobility.   2. Patient will improve left knee AROM to at least 0-90 in order to improve " gait.   3. Patient will improve hip flexor/quadriceps mobility to WNL in order to improve hip/knee AROM and improved gait function   4. Pt will improve hip abduction strength to 4+/5 to improve functional gait deviation.      Long Term Goals: (6 weeks) -Progressing, not met   1. Patient will be independent with updated HEP supplement PT in improving functional mobility.   2. Patient will improve left knee AROM to at least 0-120 in order to improve gait and ability to perform ADLs.   3. Patient will improve FOTO knee survey score to predicted level in order to demo improved functional mobility.   4. Patient will perform TUG in < 12 seconds without AD in order to decrease risk of falling   5. Patient will perform at least 10 sit to stands without UE support on 30 second sit to stand test in order to demo improved ability to perform transfers.   6. Patient goal: get back to normal     PLAN   Plan of care Certification: 11/13/2024 to 12/25/2024.     Progress as tolerated per TKA protocol.     Axel Horta, PT

## 2024-12-23 ENCOUNTER — CLINICAL SUPPORT (OUTPATIENT)
Dept: REHABILITATION | Facility: HOSPITAL | Age: 69
End: 2024-12-23
Payer: MEDICARE

## 2024-12-23 DIAGNOSIS — M25.60 RANGE OF MOTION DEFICIT: ICD-10-CM

## 2024-12-23 DIAGNOSIS — R26.9 GAIT ABNORMALITY: Primary | ICD-10-CM

## 2024-12-23 PROCEDURE — 97530 THERAPEUTIC ACTIVITIES: CPT | Mod: PN

## 2024-12-23 PROCEDURE — 97112 NEUROMUSCULAR REEDUCATION: CPT | Mod: PN

## 2024-12-23 NOTE — PROGRESS NOTES
[facility]   Physical Therapy Treatment Note     Name: Willi Leyva [place]  Clinic Number: [id number]    Therapy Diagnosis:   Encounter Diagnoses   Name Primary?    Gait abnormality Yes    Range of motion deficit      Physician: Sen Castro,*    Visit Date: 12/23/2024    Physician Orders: PT eval and treat  Medical Diagnosis from Referral:   Diagnosis   Z96.652 (ICD-10-CM) - S/P total knee replacement, left      Evaluation Date: 11/13/2024  Authorization Period Expiration: 12/31/2024   Plan of Care Expiration: 12/25/2024  Progress Note Due: 1/13/2024  Visit # / Visits authorized: 9 / 20  FOTO: 1/ 3      FOTO 1st: 59%  Predicted Score: 83%  FOTO 3rd:  FOTO 10th:    Time in: 0900    Time out: 0956  Total Billable Time: 56 minutes    Precautions:  Standard , HTN, DM     Date of Procedure: 11/12/2024   Post Op Day: 2 weeks as of 11/26  Procedure: Procedure(s) (LRB):  ROBOTIC ARTHROPLASTY, KNEE, TOTAL (Left)     Surgeons and Role:     * Sen Castro MD - Primary      SUBJECTIVE     Pt reports: no complaints.  He was compliant with home exercise program.  Response to previous treatment: no issues stated   Functional change: ongoing    Pain: 5/10  Location: left knee     OBJECTIVE     Objective Measures updated at progress report unless specified.     Flexion 95 degrees     Treatment       Willi received the treatments listed below:      Manual therapy techniques: Joint mobilizations were applied to the: left knee for 0 minutes, including:    Patellar mobs  Passive knee extension  Soft Tissue Mobilization posterior knee region    Neuromuscular re-education activities to improve: Balance, Coordination, Kinesthetic, Sense, Proprioception, and Posture for 32 minutes. The following activities were included:    Nustep level 2 for activity tolerance 10'     Heel prop 4# x 6 minutes (increase time and weight next time)  Heel slides x 30  Straight leg raise 2# 2 x 15  Bridges 3 x 10   "  Short Arc Quad 4# x 30   Long arc quad 3 x 10 4#    Slant board Gastroc stretch 3 x 30 sec Bilateral  Knee flexion stretch on step x 8    Therapeutic activities to improve functional performance for 23 minutes, including:    Sit to stands, airex 2 x 10     Shuttle  DL  50#  3x10  Shuttle SL 25#  3x10  Precor knee extension 30# 3 x 10   Sled push/pull 20# 10 yards x 3 laps     Step up 6" with eccentric control x 20      Patient Education and Home Exercises     Home Exercises Provided and Patient Education Provided     Education provided:   - Home Exercise Program     Written Home Exercises Provided: Patient instructed to cont prior HEP. Exercises were reviewed and Willi was able to demonstrate them prior to the end of the session.  Willi demonstrated good  understanding of the education provided. See EMR under Patient Instructions for exercises provided during therapy sessions    ASSESSMENT     Willi presented to clinic with improved function overall and improved gait. Continued with quad and functional strengthening, progressing where able. He did well with all interventions. Plan to progress heel prop and otherwise as tolerated.     Willi is progressing well towards his goals.     Pt prognosis is Excellent.     Pt will continue to benefit from skilled outpatient physical therapy to address the deficits listed in the problem list box on initial evaluation, provide pt/family education and to maximize pt's level of independence in the home and community environment.     Pt's spiritual, cultural and educational needs considered and pt agreeable to plan of care and goals.     Anticipated barriers to physical therapy: age, co-morbidities     Goals:   Short Term Goals: (3 weeks) -Progressing, not met   1. Patient will be independent with Home Exercise Program in order to supplement patient in improving functional mobility.   2. Patient will improve left knee AROM to at least 0-90 in order to improve gait.   3. " Patient will improve hip flexor/quadriceps mobility to WNL in order to improve hip/knee AROM and improved gait function   4. Pt will improve hip abduction strength to 4+/5 to improve functional gait deviation.      Long Term Goals: (6 weeks) -Progressing, not met   1. Patient will be independent with updated HEP supplement PT in improving functional mobility.   2. Patient will improve left knee AROM to at least 0-120 in order to improve gait and ability to perform ADLs.   3. Patient will improve FOTO knee survey score to predicted level in order to demo improved functional mobility.   4. Patient will perform TUG in < 12 seconds without AD in order to decrease risk of falling   5. Patient will perform at least 10 sit to stands without UE support on 30 second sit to stand test in order to demo improved ability to perform transfers.   6. Patient goal: get back to normal     PLAN   Plan of care Certification: 11/13/2024 to 12/25/2024.     Progress as tolerated per TKA protocol.     Merline Albarado, PT, DPT   Board Certified Clinical Specialist in Orthopedic Physical Therapy

## 2024-12-26 ENCOUNTER — OFFICE VISIT (OUTPATIENT)
Dept: ORTHOPEDICS | Facility: CLINIC | Age: 69
End: 2024-12-26
Payer: MEDICARE

## 2024-12-26 DIAGNOSIS — Z96.652 S/P TOTAL KNEE REPLACEMENT, LEFT: Primary | ICD-10-CM

## 2024-12-26 PROCEDURE — 99999 PR PBB SHADOW E&M-EST. PATIENT-LVL I: CPT | Mod: PBBFAC,,, | Performed by: ORTHOPAEDIC SURGERY

## 2024-12-26 NOTE — PROGRESS NOTES
Past Medical History:   Diagnosis Date    Anemia due to chronic blood loss 10/16/2022    Anemia, unspecified 10/16/2022    Anticoagulant long-term use     Arthritis     Diabetes mellitus     type 2    Encounter for blood transfusion     GI bleed     Gout, unspecified     Hypertension     Iron deficiency anemia 10/16/2022    Normochromic normocytic anemia 10/16/2022    Paroxysmal atrial fibrillation 2022       Past Surgical History:   Procedure Laterality Date    COLONOSCOPY N/A 09/21/2022    Procedure: COLONOSCOPY;  Surgeon: Naseem Suh MD;  Location: [facility];  Service: Endoscopy;  Laterality: N/A;    ESOPHAGOGASTRODUODENOSCOPY N/A 07/27/2022    Procedure: EGD (ESOPHAGOGASTRODUODENOSCOPY);  Surgeon: Naseem Suh MD;  Location: [facility];  Service: Endoscopy;  Laterality: N/A;    ESOPHAGOGASTRODUODENOSCOPY N/A 09/21/2022    Procedure: EGD (ESOPHAGOGASTRODUODENOSCOPY);  Surgeon: Naseem Suh MD;  Location: [facility];  Service: Endoscopy;  Laterality: N/A;    ROBOTIC ARTHROPLASTY, KNEE Left 11/12/2024    Procedure: ROBOTIC ARTHROPLASTY, KNEE, TOTAL;  Surgeon: Sen Castro MD;  Location: [facility];  Service: Orthopedics;  Laterality: Left;  kezia    watchman      12/2024       Current Outpatient Medications   Medication Sig    acetaminophen (TYLENOL) 500 MG tablet Take 2 tablets (1,000 mg total) by mouth every 8 (eight) hours as needed for Pain.    allopurinoL (ZYLOPRIM) 300 MG tablet Take 300 mg by mouth once daily.    aspirin (ECOTRIN) 81 MG EC tablet Take 1 tablet (81 mg total) by mouth 2 (two) times daily.    aspirin 81 MG Chew Take 81 mg by mouth once daily.    colchicine (COLCRYS) 0.6 mg tablet Take by mouth.    diltiaZEM (CARDIZEM CD) 180 MG 24 hr capsule Take 180 mg by mouth once daily.    docosahexaenoic acid/epa (FISH OIL ORAL)     gabapentin (NEURONTIN) 600 MG tablet Take 600 mg by mouth 3 (three) times daily.    ibuprofen (ADVIL,MOTRIN) 600 MG tablet Take 1 tablet (600 mg  total) by mouth every 6 (six) hours as needed for Pain.    iron-vitamin C 100-250 mg, ICAR-C, (ICAR-C) 100-250 mg Tab Take 1 tablet by mouth once daily.    losartan-hydrochlorothiazide 50-12.5 mg (HYZAAR) 50-12.5 mg per tablet Take 1 tablet by mouth once daily. BID    meloxicam (MOBIC) 7.5 MG tablet meloxicam 7.5 mg tablet    metFORMIN (GLUCOPHAGE) 1000 MG tablet metformin 1,000 mg tablet    multivitamin-iron-folic acid (CENTRUM COMPLETE) Tab 1 tablet    ondansetron (ZOFRAN) 4 MG tablet Take 1 tablet (4 mg total) by mouth every 6 (six) hours as needed for Nausea.    oxyCODONE-acetaminophen (PERCOCET) 5-325 mg per tablet Take 1 tablet by mouth every 6 (six) hours as needed for Pain.    rosuvastatin (CRESTOR) 10 MG tablet Take 10 mg by mouth every evening.    sildenafiL (VIAGRA) 25 MG tablet Take 25 mg by mouth daily as needed.    sucralfate (CARAFATE) 1 gram tablet TAKE 1 TABLET BY MOUTH TWICE A DAY ON EMPTY STOMACH     No current facility-administered medications for this visit.       Review of patient's allergies indicates:   Allergen Reactions    Ace inhibitors Other (See Comments) and Swelling    Pcn [penicillins] Itching and Swelling    Statins-hmg-coa reductase inhibitors Other (See Comments)     Throat swelling       Family History   Problem Relation Name Age of Onset    Aneurysm Mother      No Known Problems Father         Social History     Socioeconomic History    Marital status:    Tobacco Use    Smoking status: Former     Types: Cigarettes    Smokeless tobacco: Never    Tobacco comments:     Started in 1975 1- 1.5 pack a day until 1999   Substance and Sexual Activity    Alcohol use: Yes     Alcohol/week: 30.0 standard drinks of alcohol     Types: 30 Cans of beer per week     Comment: daily    Drug use: Never     Social Drivers of Health     Financial Resource Strain: Medium Risk (8/20/2024)    Overall Financial Resource Strain (CARDIA)     Difficulty of Paying Living Expenses: Somewhat hard   Food  Insecurity: Food Insecurity Present (8/20/2024)    Hunger Vital Sign     Worried About Running Out of Food in the Last Year: Sometimes true     Ran Out of Food in the Last Year: Never true   Physical Activity: Unknown (8/20/2024)    Exercise Vital Sign     Days of Exercise per Week: 4 days   Stress: No Stress Concern Present (8/20/2024)    [employer] - Occupational Stress Questionnaire     Feeling of Stress : Not at all   Housing Stability: Unknown (8/20/2024)    Housing Stability Vital Sign     Unable to Pay for Housing in the Last Year: No       Chief Complaint:   No chief complaint on file.      Date of surgery:  November 12, 2024    History of present illness:  69-year-old who underwent left robotic assisted total knee arthroplasty.  He is doing well.  Just has a fair amount of swelling still in pain at night.  A little IT band pain now.  Rates his pain is a 4/10.  No wound issues.      Review of Systems:    Musculoskeletal:  See HPI        Physical Examination:    Vital Signs:    There were no vitals filed for this visit.      There is no height or weight on file to calculate BMI.    This a well-developed, well nourished patient in no acute distress.  They are alert and oriented and cooperative to examination.  Pt. walks without an antalgic gait.      Examination left knee shows well-healed surgical incision.  No erythema drainage.  Mild swelling.  Patient has range of motion is about 0° to 120°.  No calf pain.    X-rays:  Two views left knee are reviewed which show well-aligned left total knee arthroplasty without complication     Assessment::  Status post left total knee arthroplasty  Left IT band tendinitis    Plan:  I reviewed the findings with him today.  Continue the physical therapy.  Offered to give him some pain medicine to help with his night pain.  Patient will try some over-the-counter Tylenol PM 1st.  Follow up in 6 weeks with x-rays of both knees.    This note was  created using [employer] voice recognition software that occasionally misinterpreted phrases or words.

## 2024-12-27 ENCOUNTER — CLINICAL SUPPORT (OUTPATIENT)
Dept: REHABILITATION | Facility: HOSPITAL | Age: 69
End: 2024-12-27
Payer: MEDICARE

## 2024-12-27 DIAGNOSIS — R26.9 GAIT ABNORMALITY: Primary | ICD-10-CM

## 2024-12-27 DIAGNOSIS — M25.60 RANGE OF MOTION DEFICIT: ICD-10-CM

## 2024-12-27 PROCEDURE — 97112 NEUROMUSCULAR REEDUCATION: CPT | Mod: PN

## 2024-12-27 PROCEDURE — 97530 THERAPEUTIC ACTIVITIES: CPT | Mod: PN

## 2024-12-27 NOTE — PROGRESS NOTES
[facility]   Physical Therapy Treatment Note     Name: Willi Leyva [facility] Number: [id number]    Therapy Diagnosis:   Encounter Diagnoses   Name Primary?    Gait abnormality Yes    Range of motion deficit        Physician: Sen Castro,*    Visit Date: 12/27/2024    Physician Orders: PT eval and treat  Medical Diagnosis from Referral:   Diagnosis   Z96.652 (ICD-10-CM) - S/P total knee replacement, left      Evaluation Date: 11/13/2024  Authorization Period Expiration: 12/31/2024   Plan of Care Expiration: 12/25/2024  Progress Note Due: 1/13/2024  Visit # / Visits authorized: 10 / 20  FOTO: 1/ 3      FOTO 1st: 59%  Predicted Score: 83%  FOTO 3rd:  FOTO 10th:    Time in: 0800    Time out: 0856  Total Billable Time: 56 minutes    Precautions:  Standard , HTN, DM     Date of Procedure: 11/12/2024   Post Op Day: 2 weeks as of 11/26  Procedure: Procedure(s) (LRB):  ROBOTIC ARTHROPLASTY, KNEE, TOTAL (Left)     Surgeons and Role:     * Sen Castro MD - Primary      SUBJECTIVE     Pt reports: feels like he is getting better. Moving around better.   He was compliant with home exercise program.  Response to previous treatment: no issues stated   Functional change: ongoing    Pain: 5/10  Location: left knee     OBJECTIVE     Objective Measures updated at progress report unless specified.     Extension -5 degrees   Flexion 110 degrees     Treatment       Willi received the treatments listed below:      Manual therapy techniques: Joint mobilizations were applied to the: left knee for 0 minutes, including:    Patellar mobs  Passive knee extension  Soft Tissue Mobilization posterior knee region    Neuromuscular re-education activities to improve: Balance, Coordination, Kinesthetic, Sense, Proprioception, and Posture for 32 minutes. The following activities were included:    Nustep level 2 for activity tolerance 10'     Heel prop 4# x 6 minutes (increase time and weight next  "time)  Heel slides x 30  Straight leg raise 2# 2 x 15  Bridges 3 x 10    Short Arc Quad 4# x 30   Long arc quad 3 x 10 4#    Slant board Gastroc stretch 3 x 30 sec Bilateral  Knee flexion stretch on step x 8    Therapeutic activities to improve functional performance for 23 minutes, including:    Sit to stands, airex 2 x 10     Shuttle  DL  50#  3x10  Shuttle SL 25#  3x10  Precor knee extension 30# 3 x 10   Sled push/pull 20# 10 yards x 3 laps     Step up 6" with eccentric control x 20      Patient Education and Home Exercises     Home Exercises Provided and Patient Education Provided     Education provided:   - Home Exercise Program     Written Home Exercises Provided: Patient instructed to cont prior HEP. Exercises were reviewed and Willi was able to demonstrate them prior to the end of the session.  Willi demonstrated good  understanding of the education provided. See EMR under Patient Instructions for exercises provided during therapy sessions    ASSESSMENT     Willi presented with improved function. He tolerated interventions well with appropriate fatigue. Will progress as able.     Willi is progressing well towards his goals.     Pt prognosis is Excellent.     Pt will continue to benefit from skilled outpatient physical therapy to address the deficits listed in the problem list box on initial evaluation, provide pt/family education and to maximize pt's level of independence in the home and community environment.     Pt's spiritual, cultural and educational needs considered and pt agreeable to plan of care and goals.     Anticipated barriers to physical therapy: age, co-morbidities     Goals:   Short Term Goals: (3 weeks) -Progressing, not met   1. Patient will be independent with Home Exercise Program in order to supplement patient in improving functional mobility.   2. Patient will improve left knee AROM to at least 0-90 in order to improve gait.   3. Patient will improve hip flexor/quadriceps " mobility to WNL in order to improve hip/knee AROM and improved gait function   4. Pt will improve hip abduction strength to 4+/5 to improve functional gait deviation.      Long Term Goals: (6 weeks) -Progressing, not met   1. Patient will be independent with updated HEP supplement PT in improving functional mobility.   2. Patient will improve left knee AROM to at least 0-120 in order to improve gait and ability to perform ADLs.   3. Patient will improve FOTO knee survey score to predicted level in order to demo improved functional mobility.   4. Patient will perform TUG in < 12 seconds without AD in order to decrease risk of falling   5. Patient will perform at least 10 sit to stands without UE support on 30 second sit to stand test in order to demo improved ability to perform transfers.   6. Patient goal: get back to normal     PLAN   Plan of care Certification: 11/13/2024 to 12/25/2024.     Progress as tolerated per TKA protocol.     Merline Albarado, PT, DPT   Board Certified Clinical Specialist in Orthopedic Physical Therapy

## 2025-01-06 ENCOUNTER — CLINICAL SUPPORT (OUTPATIENT)
Dept: REHABILITATION | Facility: HOSPITAL | Age: 70
End: 2025-01-06
Payer: MEDICARE

## 2025-01-06 DIAGNOSIS — M25.60 RANGE OF MOTION DEFICIT: ICD-10-CM

## 2025-01-06 DIAGNOSIS — R26.9 GAIT ABNORMALITY: Primary | ICD-10-CM

## 2025-01-06 PROCEDURE — 97530 THERAPEUTIC ACTIVITIES: CPT | Mod: PN

## 2025-01-06 PROCEDURE — 97112 NEUROMUSCULAR REEDUCATION: CPT | Mod: PN

## 2025-01-06 NOTE — PROGRESS NOTES
[facility]   Physical Therapy Treatment Note     Name: Willi Leyva [place]  Clinic Number: [id number]    Therapy Diagnosis:   Encounter Diagnoses   Name Primary?    Gait abnormality Yes    Range of motion deficit        Physician: Sen Castro,*    Visit Date: 1/6/2025    Physician Orders: PT eval and treat  Medical Diagnosis from Referral:   Diagnosis   Z96.652 (ICD-10-CM) - S/P total knee replacement, left      Evaluation Date: 11/13/2024  Authorization Period Expiration: 12/31/2024   Plan of Care Expiration: 2/5/2024  Progress Note Due: 2/13/2024  Visit # / Visits authorized: 1/20 (11 total)  FOTO: 1/ 3      FOTO 1st: 59%  Predicted Score: 83%  FOTO 3rd:  FOTO 10th:    Time in: 0800    Time out: 0856  Total Billable Time: 56 minutes    Precautions:  Standard , HTN, DM     Date of Procedure: 11/12/2024   Post Op Day: 2 weeks as of 11/26  Procedure: Procedure(s) (LRB):  ROBOTIC ARTHROPLASTY, KNEE, TOTAL (Left)     Surgeons and Role:     * Sen Castro MD - Primary      SUBJECTIVE     Pt reports: feels like he is getting better. Moving around better.   He was compliant with home exercise program.  Response to previous treatment: no issues stated   Functional change: ongoing    Pain: 5/10  Location: left knee     OBJECTIVE     Objective Measures updated at progress report unless specified.     Extension -5 degrees   Flexion 112 degrees     Straight leg raise with 1# weight x 30    Treatment       Willi received the treatments listed below:      Manual therapy techniques: Joint mobilizations were applied to the: left knee for 0 minutes, including:    Patellar mobs  Passive knee extension  Soft Tissue Mobilization posterior knee region    Neuromuscular re-education activities to improve: Balance, Coordination, Kinesthetic, Sense, Proprioception, and Posture for 32 minutes. The following activities were included:    Nustep level 3 for activity tolerance 10'     Heel prop 5#  "x 8 minutes   Heel slides x 30  Straight leg raise 2# 2 x 15  Bridges 3 x 10    Short Arc Quad 4# x 30   Long arc quad 3 x 10 4#    Slant board Gastroc stretch 3 x 30 sec Bilateral  Knee flexion stretch on step x 8    Therapeutic activities to improve functional performance for 23 minutes, including:    Sit to stands, airex 2 x 10     Shuttle  DL  50#  3x10  Shuttle SL 25#  3x10  Precor knee extension 30# 3 x 10   Sled push/pull 20# 10 yards x 3 laps     Step up 6" with eccentric control x 20      Patient Education and Home Exercises     Home Exercises Provided and Patient Education Provided     Education provided:   - Home Exercise Program     Written Home Exercises Provided: Patient instructed to cont prior HEP. Exercises were reviewed and Willi was able to demonstrate them prior to the end of the session.  Willi demonstrated good  understanding of the education provided. See EMR under Patient Instructions for exercises provided during therapy sessions    ASSESSMENT     Willi presented with reports of good function but still some discomfort in the knee. Doing very well overall. Showing good progress with range of motion and strength. Still limited in functional strength as described by difficulty with his 10 steps at home. Added a few more weeks to continue to progress functional strength and work on the last little bit of range of motion. Will progress as tolerated.     Willi is progressing well towards his goals.     Pt prognosis is Excellent.     Pt will continue to benefit from skilled outpatient physical therapy to address the deficits listed in the problem list box on initial evaluation, provide pt/family education and to maximize pt's level of independence in the home and community environment.     Pt's spiritual, cultural and educational needs considered and pt agreeable to plan of care and goals.     Anticipated barriers to physical therapy: age, co-morbidities     Goals:   Short Term Goals: (3 " weeks) -Progressing, not met   1. Patient will be independent with Home Exercise Program in order to supplement patient in improving functional mobility.   2. Patient will improve left knee AROM to at least 0-90 in order to improve gait.   3. Patient will improve hip flexor/quadriceps mobility to WNL in order to improve hip/knee AROM and improved gait function   4. Pt will improve hip abduction strength to 4+/5 to improve functional gait deviation.      Long Term Goals: (6 weeks) -Progressing, not met   1. Patient will be independent with updated HEP supplement PT in improving functional mobility.   2. Patient will improve left knee AROM to at least 0-120 in order to improve gait and ability to perform ADLs.   3. Patient will improve FOTO knee survey score to predicted level in order to demo improved functional mobility.   4. Patient will perform TUG in < 12 seconds without AD in order to decrease risk of falling   5. Patient will perform at least 10 sit to stands without UE support on 30 second sit to stand test in order to demo improved ability to perform transfers.   6. Patient goal: get back to normal     PLAN   Plan of care Certification: 11/13/2024 to 2/5/2024.     Progress as tolerated per TKA protocol.     Merline Albarado, PT, DPT   Board Certified Clinical Specialist in Orthopedic Physical Therapy

## 2025-01-06 NOTE — PLAN OF CARE
[facility]   Physical Therapy Treatment Note     Name: Willi Leyva [place]  Clinic Number: [id number]    Therapy Diagnosis:   Encounter Diagnoses   Name Primary?    Gait abnormality Yes    Range of motion deficit        Physician: eSn Castro,*    Visit Date: 1/6/2025    Physician Orders: PT eval and treat  Medical Diagnosis from Referral:   Diagnosis   Z96.652 (ICD-10-CM) - S/P total knee replacement, left      Evaluation Date: 11/13/2024  Authorization Period Expiration: 12/31/2024   Plan of Care Expiration: 2/5/2024  Progress Note Due: 2/13/2024  Visit # / Visits authorized: 1/20 (11 total)  FOTO: 1/ 3      FOTO 1st: 59%  Predicted Score: 83%  FOTO 3rd:  FOTO 10th:    Time in: 0800    Time out: 0856  Total Billable Time: 56 minutes    Precautions:  Standard , HTN, DM     Date of Procedure: 11/12/2024   Post Op Day: 2 weeks as of 11/26  Procedure: Procedure(s) (LRB):  ROBOTIC ARTHROPLASTY, KNEE, TOTAL (Left)     Surgeons and Role:     * Sen Castro MD - Primary      SUBJECTIVE     Pt reports: feels like he is getting better. Moving around better.   He was compliant with home exercise program.  Response to previous treatment: no issues stated   Functional change: ongoing    Pain: 5/10  Location: left knee     OBJECTIVE     Objective Measures updated at progress report unless specified.     Extension -5 degrees   Flexion 112 degrees     Straight leg raise with 1# weight x 30    Treatment       Willi received the treatments listed below:      Manual therapy techniques: Joint mobilizations were applied to the: left knee for 0 minutes, including:    Patellar mobs  Passive knee extension  Soft Tissue Mobilization posterior knee region    Neuromuscular re-education activities to improve: Balance, Coordination, Kinesthetic, Sense, Proprioception, and Posture for 32 minutes. The following activities were included:    Nustep level 3 for activity tolerance 10'     Heel prop 5#  "x 8 minutes   Heel slides x 30  Straight leg raise 2# 2 x 15  Bridges 3 x 10    Short Arc Quad 4# x 30   Long arc quad 3 x 10 4#    Slant board Gastroc stretch 3 x 30 sec Bilateral  Knee flexion stretch on step x 8    Therapeutic activities to improve functional performance for 23 minutes, including:    Sit to stands, airex 2 x 10     Shuttle  DL  50#  3x10  Shuttle SL 25#  3x10  Precor knee extension 30# 3 x 10   Sled push/pull 20# 10 yards x 3 laps     Step up 6" with eccentric control x 20      Patient Education and Home Exercises     Home Exercises Provided and Patient Education Provided     Education provided:   - Home Exercise Program     Written Home Exercises Provided: Patient instructed to cont prior HEP. Exercises were reviewed and Willi was able to demonstrate them prior to the end of the session.  Willi demonstrated good  understanding of the education provided. See EMR under Patient Instructions for exercises provided during therapy sessions    ASSESSMENT     Willi presented with reports of good function but still some discomfort in the knee. Doing very well overall. Showing good progress with range of motion and strength. Still limited in functional strength as described by difficulty with his 10 steps at home. Added a few more weeks to continue to progress functional strength and work on the last little bit of range of motion. Will progress as tolerated.     Willi is progressing well towards his goals.     Pt prognosis is Excellent.     Pt will continue to benefit from skilled outpatient physical therapy to address the deficits listed in the problem list box on initial evaluation, provide pt/family education and to maximize pt's level of independence in the home and community environment.     Pt's spiritual, cultural and educational needs considered and pt agreeable to plan of care and goals.     Anticipated barriers to physical therapy: age, co-morbidities     Goals:   Short Term Goals: (3 " weeks) -Progressing, not met   1. Patient will be independent with Home Exercise Program in order to supplement patient in improving functional mobility.   2. Patient will improve left knee AROM to at least 0-90 in order to improve gait.   3. Patient will improve hip flexor/quadriceps mobility to WNL in order to improve hip/knee AROM and improved gait function   4. Pt will improve hip abduction strength to 4+/5 to improve functional gait deviation.      Long Term Goals: (6 weeks) -Progressing, not met   1. Patient will be independent with updated HEP supplement PT in improving functional mobility.   2. Patient will improve left knee AROM to at least 0-120 in order to improve gait and ability to perform ADLs.   3. Patient will improve FOTO knee survey score to predicted level in order to demo improved functional mobility.   4. Patient will perform TUG in < 12 seconds without AD in order to decrease risk of falling   5. Patient will perform at least 10 sit to stands without UE support on 30 second sit to stand test in order to demo improved ability to perform transfers.   6. Patient goal: get back to normal     PLAN   Plan of care Certification: 11/13/2024 to 2/5/2024.     Progress as tolerated per TKA protocol.     Merline Albarado, PT, DPT   Board Certified Clinical Specialist in Orthopedic Physical Therapy

## 2025-01-08 ENCOUNTER — CLINICAL SUPPORT (OUTPATIENT)
Dept: REHABILITATION | Facility: HOSPITAL | Age: 70
End: 2025-01-08
Payer: MEDICARE

## 2025-01-08 DIAGNOSIS — M25.60 RANGE OF MOTION DEFICIT: ICD-10-CM

## 2025-01-08 DIAGNOSIS — R26.9 GAIT ABNORMALITY: Primary | ICD-10-CM

## 2025-01-08 PROCEDURE — 97530 THERAPEUTIC ACTIVITIES: CPT | Mod: PN

## 2025-01-08 PROCEDURE — 97112 NEUROMUSCULAR REEDUCATION: CPT | Mod: PN

## 2025-01-08 NOTE — PROGRESS NOTES
[facility]   Physical Therapy Treatment Note     Name: Willi Leyva [place]  Clinic Number: [id number]    Therapy Diagnosis:   Encounter Diagnoses   Name Primary?    Gait abnormality Yes    Range of motion deficit        Physician: Sen Castro,*    Visit Date: 1/8/2025    Physician Orders: PT eval and treat  Medical Diagnosis from Referral:   Diagnosis   Z96.652 (ICD-10-CM) - S/P total knee replacement, left      Evaluation Date: 11/13/2024  Authorization Period Expiration: 12/31/2024   Plan of Care Expiration: 2/5/2024  Progress Note Due: 2/13/2024  Visit # / Visits authorized: 2/20 (12 total)  FOTO: 1/ 3      FOTO 1st: 59%  Predicted Score: 83%  FOTO 3rd:  FOTO 10th:    Time in: 0910    Time out: 0956  Total Billable Time: 46 minutes    Precautions:  Standard , HTN, DM     Date of Procedure: 11/12/2024   Post Op Day: 2 weeks as of 11/26  Procedure: Procedure(s) (LRB):  ROBOTIC ARTHROPLASTY, KNEE, TOTAL (Left)     Surgeons and Role:     * Sen Castro MD - Primary      SUBJECTIVE     Pt reports: feels like he is getting better. Moving around better.   He was compliant with home exercise program.  Response to previous treatment: no issues stated   Functional change: ongoing    Pain: 5/10  Location: left knee     OBJECTIVE     Objective Measures updated at progress report unless specified.     Extension -5 degrees   Flexion 112 degrees     Straight leg raise with 2# weight x 30    Treatment       Willi received the treatments listed below:      Manual therapy techniques: Joint mobilizations were applied to the: left knee for 0 minutes, including:    Patellar mobs  Passive knee extension  Soft Tissue Mobilization posterior knee region    Neuromuscular re-education activities to improve: Balance, Coordination, Kinesthetic, Sense, Proprioception, and Posture for 32 minutes. The following activities were included:    Nustep level 4 for activity tolerance 10'     Heel prop 5#  "x 8 minutes   Heel slides x 30  Straight leg raise 2# 2 x 15  Bridges 3 x 10    Short Arc Quad 4# x 30   Long arc quad 3 x 10 4#    Slant board Gastroc stretch 3 x 30 sec Bilateral  Knee flexion stretch on step x 8    Therapeutic activities to improve functional performance for 13 minutes, including:    Sit to stands, airex 2 x 10, 10#    Shuttle  DL  50#  3x10  Shuttle SL 25#  3x10  Sled push/pull 20# 10 yards x 3 laps     Step up 6" with eccentric control x 20    Not Performed Today:  Precor knee extension 30# 3 x 10       Patient Education and Home Exercises     Home Exercises Provided and Patient Education Provided     Education provided:   - Home Exercise Program     Written Home Exercises Provided: Patient instructed to cont prior HEP. Exercises were reviewed and Willi was able to demonstrate them prior to the end of the session.  Willi demonstrated good  understanding of the education provided. See EMR under Patient Instructions for exercises provided during therapy sessions    ASSESSMENT     Willi presented with reports of good function but still some discomfort in the knee. Doing well overall. Limited today with time, so we left a few interventions out. Progressed with resistance. He did well with this.     Willi is progressing well towards his goals.     Pt prognosis is Excellent.     Pt will continue to benefit from skilled outpatient physical therapy to address the deficits listed in the problem list box on initial evaluation, provide pt/family education and to maximize pt's level of independence in the home and community environment.     Pt's spiritual, cultural and educational needs considered and pt agreeable to plan of care and goals.     Anticipated barriers to physical therapy: age, co-morbidities     Goals:   Short Term Goals: (3 weeks) -Progressing, not met   1. Patient will be independent with Home Exercise Program in order to supplement patient in improving functional mobility.   2. " Patient will improve left knee AROM to at least 0-90 in order to improve gait.   3. Patient will improve hip flexor/quadriceps mobility to WNL in order to improve hip/knee AROM and improved gait function   4. Pt will improve hip abduction strength to 4+/5 to improve functional gait deviation.      Long Term Goals: (6 weeks) -Progressing, not met   1. Patient will be independent with updated HEP supplement PT in improving functional mobility.   2. Patient will improve left knee AROM to at least 0-120 in order to improve gait and ability to perform ADLs.   3. Patient will improve FOTO knee survey score to predicted level in order to demo improved functional mobility.   4. Patient will perform TUG in < 12 seconds without AD in order to decrease risk of falling   5. Patient will perform at least 10 sit to stands without UE support on 30 second sit to stand test in order to demo improved ability to perform transfers.   6. Patient goal: get back to normal     PLAN   Plan of care Certification: 11/13/2024 to 2/5/2024.     Progress as tolerated per TKA protocol.     Merline Albarado, PT, DPT   Board Certified Clinical Specialist in Orthopedic Physical Therapy

## 2025-01-14 ENCOUNTER — CLINICAL SUPPORT (OUTPATIENT)
Dept: REHABILITATION | Facility: HOSPITAL | Age: 70
End: 2025-01-14
Payer: MEDICARE

## 2025-01-14 DIAGNOSIS — M25.60 RANGE OF MOTION DEFICIT: ICD-10-CM

## 2025-01-14 DIAGNOSIS — R26.9 GAIT ABNORMALITY: Primary | ICD-10-CM

## 2025-01-14 PROCEDURE — 97530 THERAPEUTIC ACTIVITIES: CPT | Mod: PN

## 2025-01-14 PROCEDURE — 97112 NEUROMUSCULAR REEDUCATION: CPT | Mod: PN

## 2025-01-14 NOTE — PROGRESS NOTES
[facility]   Physical Therapy Treatment Note     Name: Willi Leyva [place]  Clinic Number: [id number]    Therapy Diagnosis:   Encounter Diagnoses   Name Primary?    Gait abnormality Yes    Range of motion deficit        Physician: Sen Castro,*    Visit Date: 1/14/2025    Physician Orders: PT eval and treat  Medical Diagnosis from Referral:   Diagnosis   Z96.652 (ICD-10-CM) - S/P total knee replacement, left      Evaluation Date: 11/13/2024  Authorization Period Expiration: 12/31/2024   Plan of Care Expiration: 2/5/2024  Progress Note Due: 2/13/2024  Visit # / Visits authorized: 2/20 (12 total)  FOTO: 1/ 3      FOTO 1st: 59%  Predicted Score: 83%  FOTO 3rd:  FOTO 10th:    Time in: 0900    Time out: 0956  Total Billable Time: 46 minutes    Precautions:  Standard , HTN, DM     Date of Procedure: 11/12/2024   Post Op Day: 9 weeks as of 1/14  Procedure: Procedure(s) (LRB):  ROBOTIC ARTHROPLASTY, KNEE, TOTAL (Left)     Surgeons and Role:     * Sen Castro MD - Primary      SUBJECTIVE     Pt reports: hurting more than usual lately. Maybe its the weather.     He was compliant with home exercise program.  Response to previous treatment: no issues stated   Functional change: ongoing    Pain: 5/10  Location: left knee     OBJECTIVE     Objective Measures updated at progress report unless specified.     Extension -8 degrees   Flexion 112 degrees     Straight leg raise with 2# weight x 30    Treatment       Willi received the treatments listed below:      Manual therapy techniques: Joint mobilizations were applied to the: left knee for 0 minutes, including:    Patellar mobs  Passive knee extension  Soft Tissue Mobilization posterior knee region    Neuromuscular re-education activities to improve: Balance, Coordination, Kinesthetic, Sense, Proprioception, and Posture for 32 minutes. The following activities were included:    Nustep level 4 for activity tolerance 10'     Heel prop  "5# x 8 minutes   Heel slides x 30  Straight leg raise 2# 2 x 15  Bridges 3 x 10    Short Arc Quad 4# x 30   Long arc quad 3 x 10 4#  Side Lying clamshell red band 2 x 15 Bilateral     Slant board Gastroc stretch 3 x 30 sec Bilateral  Knee flexion stretch on step x 8    Therapeutic activities to improve functional performance for 23 minutes, including:    Sit to stands, airex 2 x 10, 10#    Shuttle  DL  50#  3x10  Shuttle SL 50#  3x10  Sled push/pull 20# 10 yards x 3 laps     Step up 6" with eccentric control x 20    Not Performed Today:  Precor knee extension 30# 3 x 10       Patient Education and Home Exercises     Home Exercises Provided and Patient Education Provided     Education provided:   - Home Exercise Program     Written Home Exercises Provided: Patient instructed to cont prior HEP. Exercises were reviewed and Willi was able to demonstrate them prior to the end of the session.  Willi demonstrated good  understanding of the education provided. See EMR under Patient Instructions for exercises provided during therapy sessions    ASSESSMENT     Willi presented with increased pain today compared to in the past. He had increased discomfort with interventions but still performed well. He was able to progress single leg press to 50# with minimal discomfort. Will continue to progress as tolerated.     Willi is progressing well towards his goals.     Pt prognosis is Excellent.     Pt will continue to benefit from skilled outpatient physical therapy to address the deficits listed in the problem list box on initial evaluation, provide pt/family education and to maximize pt's level of independence in the home and community environment.     Pt's spiritual, cultural and educational needs considered and pt agreeable to plan of care and goals.     Anticipated barriers to physical therapy: age, co-morbidities     Goals:   Short Term Goals: (3 weeks) -Progressing, not met   1. Patient will be independent with Home " Exercise Program in order to supplement patient in improving functional mobility.   2. Patient will improve left knee AROM to at least 0-90 in order to improve gait.   3. Patient will improve hip flexor/quadriceps mobility to WNL in order to improve hip/knee AROM and improved gait function   4. Pt will improve hip abduction strength to 4+/5 to improve functional gait deviation.      Long Term Goals: (6 weeks) -Progressing, not met   1. Patient will be independent with updated HEP supplement PT in improving functional mobility.   2. Patient will improve left knee AROM to at least 0-120 in order to improve gait and ability to perform ADLs.   3. Patient will improve FOTO knee survey score to predicted level in order to demo improved functional mobility.   4. Patient will perform TUG in < 12 seconds without AD in order to decrease risk of falling   5. Patient will perform at least 10 sit to stands without UE support on 30 second sit to stand test in order to demo improved ability to perform transfers.   6. Patient goal: get back to normal     PLAN   Plan of care Certification: 11/13/2024 to 2/5/2024.     Progress as tolerated per TKA protocol.     Merline Albarado, PT, DPT   Board Certified Clinical Specialist in Orthopedic Physical Therapy

## 2025-01-16 ENCOUNTER — CLINICAL SUPPORT (OUTPATIENT)
Dept: REHABILITATION | Facility: HOSPITAL | Age: 70
End: 2025-01-16
Payer: MEDICARE

## 2025-01-16 DIAGNOSIS — R26.9 GAIT ABNORMALITY: Primary | ICD-10-CM

## 2025-01-16 DIAGNOSIS — M25.60 RANGE OF MOTION DEFICIT: ICD-10-CM

## 2025-01-16 PROCEDURE — 97530 THERAPEUTIC ACTIVITIES: CPT | Mod: PN

## 2025-01-16 NOTE — PROGRESS NOTES
[facility]   Physical Therapy Treatment Note     Name: Willi Leyva [place]  Clinic Number: [id number]    Therapy Diagnosis:   Encounter Diagnoses   Name Primary?    Gait abnormality Yes    Range of motion deficit        Physician: Sen Castro,*    Visit Date: 1/16/2025    Physician Orders: PT eval and treat  Medical Diagnosis from Referral:   Diagnosis   Z96.652 (ICD-10-CM) - S/P total knee replacement, left      Evaluation Date: 11/13/2024  Authorization Period Expiration: 12/31/2024   Plan of Care Expiration: 2/5/2024  Progress Note Due: 2/13/2024  Visit # / Visits authorized: 4/20 (14 total)  FOTO: 1/ 3      FOTO 1st: 59%  Predicted Score: 83%  FOTO 3rd:  FOTO 10th:    Time in: 0900    Time out: 0956  Total Billable Time: 56 minutes    Precautions:  Standard , HTN, DM     Date of Procedure: 11/12/2024   Post Op Day: 9 weeks as of 1/14  Procedure: Procedure(s) (LRB):  ROBOTIC ARTHROPLASTY, KNEE, TOTAL (Left)     Surgeons and Role:     * Sen Castro MD - Primary      SUBJECTIVE     Pt reports: hurting less today than last time.     He was compliant with home exercise program.  Response to previous treatment: no issues stated   Functional change: ongoing    Pain: 5/10  Location: left knee     OBJECTIVE     Objective Measures updated at progress report unless specified.     Extension -8 degrees   Flexion 112 degrees     Straight leg raise with 3# weight x 30    Treatment       Willi received the treatments listed below:      Manual therapy techniques: Joint mobilizations were applied to the: left knee for 0 minutes, including:    Patellar mobs  Passive knee extension  Soft Tissue Mobilization posterior knee region    Neuromuscular re-education activities to improve: Balance, Coordination, Kinesthetic, Sense, Proprioception, and Posture for 32 minutes. The following activities were included:    Nustep level 4 for activity tolerance 10'     Heel prop 5# x 8 minutes  "  Heel slides x 30  Straight leg raise 2# 2 x 15  Bridges 3 x 10    Short Arc Quad 4# x 30   Long arc quad 3 x 10 4#  Side Lying clamshell red band 2 x 15 Bilateral     Slant board Gastroc stretch 3 x 30 sec Bilateral  Knee flexion stretch on step x 8    Therapeutic activities to improve functional performance for 23 minutes, including:    Sit to stands, airex 2 x 10, 10#    Shuttle  DL  50#  3x10  Shuttle SL 50#  3x10  Sled push/pull 20# 10 yards x 3 laps     Step up 6" with eccentric control x 20    Not Performed Today:  Precor knee extension 30# 3 x 10       Patient Education and Home Exercises     Home Exercises Provided and Patient Education Provided     Education provided:   - Home Exercise Program     Written Home Exercises Provided: Patient instructed to cont prior HEP. Exercises were reviewed and Willi was able to demonstrate them prior to the end of the session.  Willi demonstrated good  understanding of the education provided. See EMR under Patient Instructions for exercises provided during therapy sessions    ASSESSMENT     Willi presented less pain today than last session. FOTO score improved. He tolerated interventions well. Will progress as tolerated. Printout of all exercises given.     Willi is progressing well towards his goals.     Pt prognosis is Excellent.     Pt will continue to benefit from skilled outpatient physical therapy to address the deficits listed in the problem list box on initial evaluation, provide pt/family education and to maximize pt's level of independence in the home and community environment.     Pt's spiritual, cultural and educational needs considered and pt agreeable to plan of care and goals.     Anticipated barriers to physical therapy: age, co-morbidities     Goals:   Short Term Goals: (3 weeks) -Progressing, not met   1. Patient will be independent with Home Exercise Program in order to supplement patient in improving functional mobility.   2. Patient will " improve left knee AROM to at least 0-90 in order to improve gait.   3. Patient will improve hip flexor/quadriceps mobility to WNL in order to improve hip/knee AROM and improved gait function   4. Pt will improve hip abduction strength to 4+/5 to improve functional gait deviation.      Long Term Goals: (6 weeks) -Progressing, not met   1. Patient will be independent with updated HEP supplement PT in improving functional mobility.   2. Patient will improve left knee AROM to at least 0-120 in order to improve gait and ability to perform ADLs.   3. Patient will improve FOTO knee survey score to predicted level in order to demo improved functional mobility.   4. Patient will perform TUG in < 12 seconds without AD in order to decrease risk of falling   5. Patient will perform at least 10 sit to stands without UE support on 30 second sit to stand test in order to demo improved ability to perform transfers.   6. Patient goal: get back to normal     PLAN   Plan of care Certification: 11/13/2024 to 2/5/2024.     Progress as tolerated per TKA protocol.     Merline Albarado, PT, DPT   Board Certified Clinical Specialist in Orthopedic Physical Therapy

## 2025-01-22 ENCOUNTER — PATIENT MESSAGE (OUTPATIENT)
Dept: REHABILITATION | Facility: HOSPITAL | Age: 70
End: 2025-01-22
Payer: MEDICARE

## 2025-01-25 ENCOUNTER — CLINICAL SUPPORT (OUTPATIENT)
Dept: REHABILITATION | Facility: HOSPITAL | Age: 70
End: 2025-01-25
Payer: MEDICARE

## 2025-01-25 DIAGNOSIS — R26.9 GAIT ABNORMALITY: Primary | ICD-10-CM

## 2025-01-25 PROCEDURE — 97530 THERAPEUTIC ACTIVITIES: CPT | Mod: PN

## 2025-01-27 ENCOUNTER — CLINICAL SUPPORT (OUTPATIENT)
Dept: REHABILITATION | Facility: HOSPITAL | Age: 70
End: 2025-01-27
Payer: MEDICARE

## 2025-01-27 DIAGNOSIS — R26.9 GAIT ABNORMALITY: Primary | ICD-10-CM

## 2025-01-27 PROCEDURE — 97530 THERAPEUTIC ACTIVITIES: CPT | Mod: PN

## 2025-01-27 PROCEDURE — 97112 NEUROMUSCULAR REEDUCATION: CPT | Mod: PN

## 2025-01-27 NOTE — PROGRESS NOTES
[facility]   Physical Therapy Treatment Note     Name: Willi Leyva [place]  Clinic Number: [id number]    Therapy Diagnosis:   No diagnosis found.      Physician: Sen Castro,*    Visit Date: 1/27/2025    Physician Orders: PT eval and treat  Medical Diagnosis from Referral:   Diagnosis   Z96.652 (ICD-10-CM) - S/P total knee replacement, left      Evaluation Date: 11/13/2024  Authorization Period Expiration: 12/31/2024   Plan of Care Expiration: 2/5/2024  Progress Note Due: 2/13/2024  Visit # / Visits authorized: 6/20 (16 total)  FOTO: 1/ 3      FOTO 1st: 59%  Predicted Score: 83%  FOTO 3rd:  FOTO 10th:    Time in: 0800    Time out: 0856  Total Billable Time: 56 minutes    Precautions:  Standard , HTN, DM     Date of Procedure: 11/12/2024   Post Op Day: 9 weeks as of 1/14  Procedure: Procedure(s) (LRB):  ROBOTIC ARTHROPLASTY, KNEE, TOTAL (Left)     Surgeons and Role:     * Sen Castro MD - Primary      SUBJECTIVE     Pt reports: knee still bothering him. Wishes it felt better by now.     He was compliant with home exercise program.  Response to previous treatment: no issues stated   Functional change: ongoing    Pain: 5/10  Location: left knee     OBJECTIVE     Objective Measures updated at progress report unless specified.     Extension -2 degrees   Flexion 112 degrees     Straight leg raise with 3# weight x 30    Treatment       Willi received the treatments listed below:      Manual therapy techniques: Joint mobilizations were applied to the: left knee for 0 minutes, including:    Patellar mobs  Passive knee extension  Soft Tissue Mobilization posterior knee region    Neuromuscular re-education activities to improve: Balance, Coordination, Kinesthetic, Sense, Proprioception, and Posture for 32 minutes. The following activities were included:    Nustep level 4 for activity tolerance 10'     Heel prop 5# x 8 minutes   Heel slides x 30  Straight leg raise 2# 2 x  "15  Bridges 3 x 10    Short Arc Quad 4# x 30   Long arc quad 3 x 10 4# Bilateral   Side Lying clamshell green band 2 x 15 Bilateral     Slant board Gastroc stretch 3 x 30 sec Bilateral  Knee flexion stretch on step x 8    Therapeutic activities to improve functional performance for 23 minutes, including:    Sit to stands, airex 2 x 10, 10#    Shuttle  DL  50#  3x10  Shuttle SL 50#  3x10  Sled push/pull 40# 10 yards x 3 laps     Step up 6" with eccentric control x 20    Not Performed Today:  Precor knee extension 30# 3 x 10       Patient Education and Home Exercises     Home Exercises Provided and Patient Education Provided     Education provided:   - Home Exercise Program     Written Home Exercises Provided: Patient instructed to cont prior HEP. Exercises were reviewed and Willi was able to demonstrate them prior to the end of the session.  Willi demonstrated good  understanding of the education provided. See EMR under Patient Instructions for exercises provided during therapy sessions    ASSESSMENT     Willi presented with no significant pain at beginning of session. He tolerated interventions well. Progress sled push weight to 40# with no issue. Will continue to progress as tolerated.     Willi is progressing well towards his goals.     Pt prognosis is Excellent.     Pt will continue to benefit from skilled outpatient physical therapy to address the deficits listed in the problem list box on initial evaluation, provide pt/family education and to maximize pt's level of independence in the home and community environment.     Pt's spiritual, cultural and educational needs considered and pt agreeable to plan of care and goals.     Anticipated barriers to physical therapy: age, co-morbidities     Goals:   Short Term Goals: (3 weeks) -Progressing, not met   1. Patient will be independent with Home Exercise Program in order to supplement patient in improving functional mobility.   2. Patient will improve left " knee AROM to at least 0-90 in order to improve gait.   3. Patient will improve hip flexor/quadriceps mobility to WNL in order to improve hip/knee AROM and improved gait function   4. Pt will improve hip abduction strength to 4+/5 to improve functional gait deviation.      Long Term Goals: (6 weeks) -Progressing, not met   1. Patient will be independent with updated HEP supplement PT in improving functional mobility.   2. Patient will improve left knee AROM to at least 0-120 in order to improve gait and ability to perform ADLs.   3. Patient will improve FOTO knee survey score to predicted level in order to demo improved functional mobility.   4. Patient will perform TUG in < 12 seconds without AD in order to decrease risk of falling   5. Patient will perform at least 10 sit to stands without UE support on 30 second sit to stand test in order to demo improved ability to perform transfers.   6. Patient goal: get back to normal     PLAN   Plan of care Certification: 11/13/2024 to 2/5/2024.     Progress as tolerated per TKA protocol.     Merline Albarado, PT, DPT   Board Certified Clinical Specialist in Orthopedic Physical Therapy

## 2025-01-28 DIAGNOSIS — Z96.652 S/P TOTAL KNEE REPLACEMENT, LEFT: Primary | ICD-10-CM

## 2025-01-31 ENCOUNTER — CLINICAL SUPPORT (OUTPATIENT)
Dept: REHABILITATION | Facility: HOSPITAL | Age: 70
End: 2025-01-31
Payer: MEDICARE

## 2025-01-31 DIAGNOSIS — R26.9 GAIT ABNORMALITY: Primary | ICD-10-CM

## 2025-01-31 PROCEDURE — 97530 THERAPEUTIC ACTIVITIES: CPT | Mod: PN

## 2025-01-31 NOTE — PROGRESS NOTES
[facility]   Physical Therapy Treatment Note     Name: Willi Leyva [place]  Clinic Number: [id number]    Therapy Diagnosis:   Encounter Diagnosis   Name Primary?    Gait abnormality Yes       Physician: Sen Castro,*    Visit Date: 1/31/2025    Physician Orders: PT eval and treat  Medical Diagnosis from Referral:   Diagnosis   Z96.652 (ICD-10-CM) - S/P total knee replacement, left      Evaluation Date: 11/13/2024  Authorization Period Expiration: 12/31/2024   Plan of Care Expiration: 2/5/2024  Progress Note Due: 2/13/2024  Visit # / Visits authorized: 7/20 (17 total)  FOTO: 1/ 3      FOTO 1st: 59%  Predicted Score: 83%  FOTO 3rd:  FOTO 10th:    Time in: 0900    Time out: 0956  Total Billable Time: 56 minutes    Precautions:  Standard , HTN, DM     Date of Procedure: 11/12/2024   Post Op Day: 9 weeks as of 1/14  Procedure: Procedure(s) (LRB):  ROBOTIC ARTHROPLASTY, KNEE, TOTAL (Left)     Surgeons and Role:     * Sen Castro MD - Primary      SUBJECTIVE     Pt reports: doing pretty well, ready to get started.    He was compliant with home exercise program.  Response to previous treatment: no issues stated   Functional change: ongoing    Pain: 5/10  Location: left knee     OBJECTIVE     Objective Measures updated at progress report unless specified.     Extension -2 degrees   Flexion 112 degrees     Straight leg raise with 3# weight x 30    Treatment       Willi received the treatments listed below:      Manual therapy techniques: Joint mobilizations were applied to the: left knee for 0 minutes, including:    Patellar mobs  Passive knee extension  Soft Tissue Mobilization posterior knee region    Neuromuscular re-education activities to improve: Balance, Coordination, Kinesthetic, Sense, Proprioception, and Posture for 32 minutes. The following activities were included:    Nustep level 4 for activity tolerance 10'     Heel prop 5# x 8 minutes   Heel slides x 30  Straight  "leg raise 2# 2 x 15  Bridges 3 x 10    Short Arc Quad 4# x 30   Long arc quad 3 x 10 4# Bilateral   Side Lying clamshell green band 2 x 15 Bilateral     Slant board Gastroc stretch 3 x 30 sec Bilateral  Knee flexion stretch on step x 8    Therapeutic activities to improve functional performance for 23 minutes, including:    Sit to stands, airex 2 x 10, 10#    Shuttle  DL  50#  3x10  Shuttle SL 50#  3x10  Sled push/pull 40# 10 yards x 3 laps     Step up 6" with eccentric control x 20    Not Performed Today:  Precor knee extension 30# 3 x 10       Patient Education and Home Exercises     Home Exercises Provided and Patient Education Provided     Education provided:   - Home Exercise Program     Written Home Exercises Provided: Patient instructed to cont prior HEP. Exercises were reviewed and Willi was able to demonstrate them prior to the end of the session.  Willi demonstrated good  understanding of the education provided. See EMR under Patient Instructions for exercises provided during therapy sessions    ASSESSMENT     Willi presented with no significant pain at beginning of session. Some soreness noted with sled push. Next session will skip Nustep to make it to more functional strengthening interventions.     Willi is progressing well towards his goals.     Pt prognosis is Excellent.     Pt will continue to benefit from skilled outpatient physical therapy to address the deficits listed in the problem list box on initial evaluation, provide pt/family education and to maximize pt's level of independence in the home and community environment.     Pt's spiritual, cultural and educational needs considered and pt agreeable to plan of care and goals.     Anticipated barriers to physical therapy: age, co-morbidities     Goals:   Short Term Goals: (3 weeks) -Progressing, not met   1. Patient will be independent with Home Exercise Program in order to supplement patient in improving functional mobility.   2. Patient " will improve left knee AROM to at least 0-90 in order to improve gait.   3. Patient will improve hip flexor/quadriceps mobility to WNL in order to improve hip/knee AROM and improved gait function   4. Pt will improve hip abduction strength to 4+/5 to improve functional gait deviation.      Long Term Goals: (6 weeks) -Progressing, not met   1. Patient will be independent with updated HEP supplement PT in improving functional mobility.   2. Patient will improve left knee AROM to at least 0-120 in order to improve gait and ability to perform ADLs.   3. Patient will improve FOTO knee survey score to predicted level in order to demo improved functional mobility.   4. Patient will perform TUG in < 12 seconds without AD in order to decrease risk of falling   5. Patient will perform at least 10 sit to stands without UE support on 30 second sit to stand test in order to demo improved ability to perform transfers.   6. Patient goal: get back to normal     PLAN   Plan of care Certification: 11/13/2024 to 2/5/2024.     Progress as tolerated per TKA protocol.     Merline Albarado, PT, DPT   Board Certified Clinical Specialist in Orthopedic Physical Therapy

## 2025-02-03 ENCOUNTER — CLINICAL SUPPORT (OUTPATIENT)
Dept: REHABILITATION | Facility: HOSPITAL | Age: 70
End: 2025-02-03
Payer: MEDICARE

## 2025-02-03 DIAGNOSIS — R26.9 GAIT ABNORMALITY: Primary | ICD-10-CM

## 2025-02-03 DIAGNOSIS — M25.60 RANGE OF MOTION DEFICIT: ICD-10-CM

## 2025-02-03 PROCEDURE — 97530 THERAPEUTIC ACTIVITIES: CPT | Mod: PN

## 2025-02-03 NOTE — PROGRESS NOTES
[facility]   Physical Therapy Treatment Note     Name: Willi Leyva [place]  Clinic Number: [id number]    Therapy Diagnosis:   Encounter Diagnoses   Name Primary?    Gait abnormality Yes    Range of motion deficit          Physician: Sne Castro,*    Visit Date: 2/3/2025    Physician Orders: PT eval and treat  Medical Diagnosis from Referral:   Diagnosis   Z96.652 (ICD-10-CM) - S/P total knee replacement, left      Evaluation Date: 11/13/2024  Authorization Period Expiration: 12/31/2024   Plan of Care Expiration: 2/5/2024  Progress Note Due: 2/13/2024  Visit # / Visits authorized: 8/20 (18 total)  FOTO: 1/ 3      FOTO 1st: 59%  Predicted Score: 83%  FOTO 3rd:  FOTO 10th:    Time in: 0900    Time out: 0946  Total Billable Time: 30 minutes    Precautions:  Standard , HTN, DM     Date of Procedure: 11/12/2024   Post Op Day: 9 weeks as of 1/14  Procedure: Procedure(s) (LRB):  ROBOTIC ARTHROPLASTY, KNEE, TOTAL (Left)     Surgeons and Role:     * Sen Castro MD - Primary      SUBJECTIVE     Pt reports: doing pretty well, ready to get started.    He was compliant with home exercise program.  Response to previous treatment: no issues stated   Functional change: ongoing    Pain: 5/10  Location: left knee     OBJECTIVE     Objective Measures updated at progress report unless specified.     Extension -2 degrees   Flexion 112 degrees     Straight leg raise with 3# weight x 30    Treatment       Willi received the treatments listed below:      Manual therapy techniques: Joint mobilizations were applied to the: left knee for 0 minutes, including:    Patellar mobs  Passive knee extension  Soft Tissue Mobilization posterior knee region    Neuromuscular re-education activities to improve: Balance, Coordination, Kinesthetic, Sense, Proprioception, and Posture for 22 minutes. The following activities were included:    Nustep level 4 for activity tolerance 10' (NP)    Heel prop 5# x 8  "minutes   Heel slides x 30  Straight leg raise 2# 2 x 15  Bridges 3 x 10    Short Arc Quad 4# x 30   Long arc quad 3 x 10 4# Bilateral   Side Lying clamshell green band 2 x 15 Bilateral     Slant board Gastroc stretch 3 x 30 sec Bilateral  Knee flexion stretch on step x 8    Therapeutic activities to improve functional performance for 23 minutes, including:    Sit to stands, airex 2 x 10, 10#    Shuttle  DL  50#  3x10  Shuttle SL 50#  3x10  Sled push/pull 40# 10 yards x 3 laps     Step up 6" with eccentric control x 20    Not Performed Today:  Precor knee extension 30# 3 x 10       Patient Education and Home Exercises     Home Exercises Provided and Patient Education Provided     Education provided:   - Home Exercise Program     Written Home Exercises Provided: Patient instructed to cont prior HEP. Exercises were reviewed and Willi was able to demonstrate them prior to the end of the session.  Willi demonstrated good  understanding of the education provided. See EMR under Patient Instructions for exercises provided during therapy sessions    ASSESSMENT     Willi presented with no significant pain at beginning of session. Tolerated interventions well with no increase in pain. Will continue to progress as tolerated.     Willi is progressing well towards his goals.     Pt prognosis is Excellent.     Pt will continue to benefit from skilled outpatient physical therapy to address the deficits listed in the problem list box on initial evaluation, provide pt/family education and to maximize pt's level of independence in the home and community environment.     Pt's spiritual, cultural and educational needs considered and pt agreeable to plan of care and goals.     Anticipated barriers to physical therapy: age, co-morbidities     Goals:   Short Term Goals: (3 weeks) -Progressing, not met   1. Patient will be independent with Home Exercise Program in order to supplement patient in improving functional mobility.   2. " Patient will improve left knee AROM to at least 0-90 in order to improve gait.   3. Patient will improve hip flexor/quadriceps mobility to WNL in order to improve hip/knee AROM and improved gait function   4. Pt will improve hip abduction strength to 4+/5 to improve functional gait deviation.      Long Term Goals: (6 weeks) -Progressing, not met   1. Patient will be independent with updated HEP supplement PT in improving functional mobility.   2. Patient will improve left knee AROM to at least 0-120 in order to improve gait and ability to perform ADLs.   3. Patient will improve FOTO knee survey score to predicted level in order to demo improved functional mobility.   4. Patient will perform TUG in < 12 seconds without AD in order to decrease risk of falling   5. Patient will perform at least 10 sit to stands without UE support on 30 second sit to stand test in order to demo improved ability to perform transfers.   6. Patient goal: get back to normal     PLAN   Plan of care Certification: 11/13/2024 to 2/5/2024.     Progress as tolerated per TKA protocol.     Merline Albarado, PT, DPT   Board Certified Clinical Specialist in Orthopedic Physical Therapy

## 2025-02-06 ENCOUNTER — OFFICE VISIT (OUTPATIENT)
Dept: ORTHOPEDICS | Facility: CLINIC | Age: 70
End: 2025-02-06
Payer: MEDICARE

## 2025-02-06 ENCOUNTER — HOSPITAL ENCOUNTER (OUTPATIENT)
Dept: RADIOLOGY | Facility: HOSPITAL | Age: 70
Discharge: HOME | End: 2025-02-06
Attending: ORTHOPAEDIC SURGERY
Payer: MEDICARE

## 2025-02-06 VITALS — BODY MASS INDEX: 28.97 KG/M2 | RESPIRATION RATE: 18 BRPM | WEIGHT: 233 LBS | HEIGHT: 75 IN

## 2025-02-06 DIAGNOSIS — Z96.652 S/P TOTAL KNEE REPLACEMENT, LEFT: Primary | ICD-10-CM

## 2025-02-06 DIAGNOSIS — Z96.652 S/P TOTAL KNEE REPLACEMENT, LEFT: ICD-10-CM

## 2025-02-06 PROCEDURE — 1159F MED LIST DOCD IN RCRD: CPT | Mod: CPTII,S$GLB,, | Performed by: ORTHOPAEDIC SURGERY

## 2025-02-06 PROCEDURE — 1101F PT FALLS ASSESS-DOCD LE1/YR: CPT | Mod: CPTII,S$GLB,, | Performed by: ORTHOPAEDIC SURGERY

## 2025-02-06 PROCEDURE — 73564 X-RAY EXAM KNEE 4 OR MORE: CPT | Mod: TC,PO,LT

## 2025-02-06 PROCEDURE — 73562 X-RAY EXAM OF KNEE 3: CPT | Mod: 26,59,RT, | Performed by: RADIOLOGY

## 2025-02-06 PROCEDURE — 3288F FALL RISK ASSESSMENT DOCD: CPT | Mod: CPTII,S$GLB,, | Performed by: ORTHOPAEDIC SURGERY

## 2025-02-06 PROCEDURE — 99999 PR PBB SHADOW E&M-EST. PATIENT-LVL IV: CPT | Mod: PBBFAC,,, | Performed by: ORTHOPAEDIC SURGERY

## 2025-02-06 PROCEDURE — 1126F AMNT PAIN NOTED NONE PRSNT: CPT | Mod: CPTII,S$GLB,, | Performed by: ORTHOPAEDIC SURGERY

## 2025-02-06 PROCEDURE — 99024 POSTOP FOLLOW-UP VISIT: CPT | Mod: S$GLB,,, | Performed by: ORTHOPAEDIC SURGERY

## 2025-02-06 PROCEDURE — 73564 X-RAY EXAM KNEE 4 OR MORE: CPT | Mod: 26,LT,, | Performed by: RADIOLOGY

## 2025-02-06 PROCEDURE — 3008F BODY MASS INDEX DOCD: CPT | Mod: CPTII,S$GLB,, | Performed by: ORTHOPAEDIC SURGERY

## 2025-02-06 NOTE — PROGRESS NOTES
Past Medical History:   Diagnosis Date    Anemia due to chronic blood loss 10/16/2022    Anemia, unspecified 10/16/2022    Anticoagulant long-term use     Arthritis     Diabetes mellitus     type 2    Encounter for blood transfusion     GI bleed     Gout, unspecified     Hypertension     Iron deficiency anemia 10/16/2022    Normochromic normocytic anemia 10/16/2022    Paroxysmal atrial fibrillation 2022       Past Surgical History:   Procedure Laterality Date    COLONOSCOPY N/A 09/21/2022    Procedure: COLONOSCOPY;  Surgeon: Naseem Suh MD;  Location: [facility];  Service: Endoscopy;  Laterality: N/A;    ESOPHAGOGASTRODUODENOSCOPY N/A 07/27/2022    Procedure: EGD (ESOPHAGOGASTRODUODENOSCOPY);  Surgeon: Naseem Suh MD;  Location: [facility];  Service: Endoscopy;  Laterality: N/A;    ESOPHAGOGASTRODUODENOSCOPY N/A 09/21/2022    Procedure: EGD (ESOPHAGOGASTRODUODENOSCOPY);  Surgeon: Naseem Suh MD;  Location: [facility];  Service: Endoscopy;  Laterality: N/A;    ROBOTIC ARTHROPLASTY, KNEE Left 11/12/2024    Procedure: ROBOTIC ARTHROPLASTY, KNEE, TOTAL;  Surgeon: Sen Castro MD;  Location: [facility];  Service: Orthopedics;  Laterality: Left;  kezia    watchman      12/2024       Current Outpatient Medications   Medication Sig    acetaminophen (TYLENOL) 500 MG tablet Take 2 tablets (1,000 mg total) by mouth every 8 (eight) hours as needed for Pain.    allopurinoL (ZYLOPRIM) 300 MG tablet Take 300 mg by mouth once daily.    aspirin (ECOTRIN) 81 MG EC tablet Take 1 tablet (81 mg total) by mouth 2 (two) times daily.    aspirin 81 MG Chew Take 81 mg by mouth once daily.    colchicine (COLCRYS) 0.6 mg tablet Take by mouth.    diltiaZEM (CARDIZEM CD) 180 MG 24 hr capsule Take 180 mg by mouth once daily.    docosahexaenoic acid/epa (FISH OIL ORAL)     gabapentin (NEURONTIN) 600 MG tablet Take 600 mg by mouth 3 (three) times daily.    ibuprofen (ADVIL,MOTRIN) 600 MG tablet Take 1 tablet (600 mg  total) by mouth every 6 (six) hours as needed for Pain.    iron-vitamin C 100-250 mg, ICAR-C, (ICAR-C) 100-250 mg Tab Take 1 tablet by mouth once daily.    losartan-hydrochlorothiazide 50-12.5 mg (HYZAAR) 50-12.5 mg per tablet Take 1 tablet by mouth once daily. BID    meloxicam (MOBIC) 7.5 MG tablet meloxicam 7.5 mg tablet    metFORMIN (GLUCOPHAGE) 1000 MG tablet metformin 1,000 mg tablet    multivitamin-iron-folic acid (CENTRUM COMPLETE) Tab 1 tablet    ondansetron (ZOFRAN) 4 MG tablet Take 1 tablet (4 mg total) by mouth every 6 (six) hours as needed for Nausea.    oxyCODONE-acetaminophen (PERCOCET) 5-325 mg per tablet Take 1 tablet by mouth every 6 (six) hours as needed for Pain.    rosuvastatin (CRESTOR) 10 MG tablet Take 10 mg by mouth every evening.    sildenafiL (VIAGRA) 25 MG tablet Take 25 mg by mouth daily as needed.    sucralfate (CARAFATE) 1 gram tablet TAKE 1 TABLET BY MOUTH TWICE A DAY ON EMPTY STOMACH     No current facility-administered medications for this visit.       Review of patient's allergies indicates:   Allergen Reactions    Ace inhibitors Other (See Comments) and Swelling    Pcn [penicillins] Itching and Swelling    Statins-hmg-coa reductase inhibitors Other (See Comments)     Throat swelling       Family History   Problem Relation Name Age of Onset    Aneurysm Mother      No Known Problems Father         Social History     Socioeconomic History    Marital status:    Tobacco Use    Smoking status: Former     Types: Cigarettes    Smokeless tobacco: Never    Tobacco comments:     Started in 1975 1- 1.5 pack a day until 1999   Substance and Sexual Activity    Alcohol use: Yes     Alcohol/week: 30.0 standard drinks of alcohol     Types: 30 Cans of beer per week     Comment: daily    Drug use: Never     Social Drivers of Health     Financial Resource Strain: Medium Risk (8/20/2024)    Overall Financial Resource Strain (CARDIA)     Difficulty of Paying Living Expenses: Somewhat hard   Food  Insecurity: Food Insecurity Present (8/20/2024)    Hunger Vital Sign     Worried About Running Out of Food in the Last Year: Sometimes true     Ran Out of Food in the Last Year: Never true   Physical Activity: Unknown (8/20/2024)    Exercise Vital Sign     Days of Exercise per Week: 4 days   Stress: No Stress Concern Present (8/20/2024)    [employer] [place] [employer] - Occupational Stress Questionnaire     Feeling of Stress : Not at all   Housing Stability: Unknown (8/20/2024)    Housing Stability Vital Sign     Unable to Pay for Housing in the Last Year: No       Chief Complaint:   No chief complaint on file.      Date of surgery:  November 12, 2024    History of present illness:  69-year-old who underwent left robotic assisted total knee arthroplasty.  He is doing well.  Just has a fair amount of swelling still in pain at night.  A little IT band pain now.  Rates his pain is a 4/10.  No wound issues.  Has 1 more session of physical therapy.  Just a little discomfort getting up from a seated position.      Review of Systems:    Musculoskeletal:  See HPI        Physical Examination:    Vital Signs:    There were no vitals filed for this visit.      There is no height or weight on file to calculate BMI.    This a well-developed, well nourished patient in no acute distress.  They are alert and oriented and cooperative to examination.  Pt. walks without an antalgic gait.      Examination left knee shows well-healed surgical incision.  No erythema drainage.  Mild swelling.  Patient has range of motion is about 0° to 120°.  No calf pain.    X-rays:  Four views of both knees are ordered and reviewed which show well-aligned left total knee arthroplasty without complication.  Moderate medial joint space narrowing     Assessment::  Status post left total knee arthroplasty  Left IT band tendinitis    Plan:  I reviewed the findings with him today.  Continue with home exercises.  I will see him back in 3  months.      This note was created using [employer] voice recognition software that occasionally misinterpreted phrases or words.

## 2025-02-13 DIAGNOSIS — R61 NIGHT SWEATS: Primary | ICD-10-CM

## 2025-03-10 ENCOUNTER — HOSPITAL ENCOUNTER (OUTPATIENT)
Dept: RADIOLOGY | Facility: HOSPITAL | Age: 70
Discharge: HOME | End: 2025-03-10
Attending: NURSE PRACTITIONER
Payer: MEDICARE

## 2025-03-10 DIAGNOSIS — R61 NIGHT SWEATS: ICD-10-CM

## 2025-03-10 PROCEDURE — 71046 X-RAY EXAM CHEST 2 VIEWS: CPT | Mod: 26,,, | Performed by: RADIOLOGY

## 2025-03-10 PROCEDURE — 71046 X-RAY EXAM CHEST 2 VIEWS: CPT | Mod: TC,PO

## 2025-06-23 ENCOUNTER — OFFICE VISIT (OUTPATIENT)
Dept: ORTHOPEDICS | Facility: CLINIC | Age: 70
End: 2025-06-23
Payer: MEDICARE

## 2025-06-23 VITALS — RESPIRATION RATE: 16 BRPM

## 2025-06-23 DIAGNOSIS — Z96.652 S/P TOTAL KNEE REPLACEMENT, LEFT: Primary | ICD-10-CM

## 2025-06-23 PROCEDURE — 1160F RVW MEDS BY RX/DR IN RCRD: CPT | Mod: CPTII,S$GLB,, | Performed by: ORTHOPAEDIC SURGERY

## 2025-06-23 PROCEDURE — 99213 OFFICE O/P EST LOW 20 MIN: CPT | Mod: S$GLB,,, | Performed by: ORTHOPAEDIC SURGERY

## 2025-06-23 PROCEDURE — 1159F MED LIST DOCD IN RCRD: CPT | Mod: CPTII,S$GLB,, | Performed by: ORTHOPAEDIC SURGERY

## 2025-06-23 PROCEDURE — 99999 PR PBB SHADOW E&M-EST. PATIENT-LVL II: CPT | Mod: PBBFAC,,, | Performed by: ORTHOPAEDIC SURGERY

## 2025-06-23 PROCEDURE — 4010F ACE/ARB THERAPY RXD/TAKEN: CPT | Mod: CPTII,S$GLB,, | Performed by: ORTHOPAEDIC SURGERY

## 2025-06-23 PROCEDURE — 1126F AMNT PAIN NOTED NONE PRSNT: CPT | Mod: CPTII,S$GLB,, | Performed by: ORTHOPAEDIC SURGERY

## 2025-06-23 NOTE — PROGRESS NOTES
Past Medical History:   Diagnosis Date    Anemia due to chronic blood loss 10/16/2022    Anemia, unspecified 10/16/2022    Anticoagulant long-term use     Arthritis     Diabetes mellitus     type 2    Encounter for blood transfusion     GI bleed     Gout, unspecified     Hypertension     Iron deficiency anemia 10/16/2022    Normochromic normocytic anemia 10/16/2022    Paroxysmal atrial fibrillation 2022       Past Surgical History:   Procedure Laterality Date    COLONOSCOPY N/A 09/21/2022    Procedure: COLONOSCOPY;  Surgeon: Naseem Suh MD;  Location: [facility];  Service: Endoscopy;  Laterality: N/A;    ESOPHAGOGASTRODUODENOSCOPY N/A 07/27/2022    Procedure: EGD (ESOPHAGOGASTRODUODENOSCOPY);  Surgeon: Naseem Suh MD;  Location: [facility] ENDO;  Service: Endoscopy;  Laterality: N/A;    ESOPHAGOGASTRODUODENOSCOPY N/A 09/21/2022    Procedure: EGD (ESOPHAGOGASTRODUODENOSCOPY);  Surgeon: Naseem Suh MD;  Location: [facility];  Service: Endoscopy;  Laterality: N/A;    ROBOTIC ARTHROPLASTY, KNEE Left 11/12/2024    Procedure: ROBOTIC ARTHROPLASTY, KNEE, TOTAL;  Surgeon: Sen Castro MD;  Location: [facility];  Service: Orthopedics;  Laterality: Left;  kezia    watchman      12/2024       Current Outpatient Medications   Medication Sig    acetaminophen (TYLENOL) 500 MG tablet Take 2 tablets (1,000 mg total) by mouth every 8 (eight) hours as needed for Pain. (Patient not taking: Reported on 2/6/2025)    allopurinoL (ZYLOPRIM) 300 MG tablet Take 300 mg by mouth once daily.    aspirin (ECOTRIN) 81 MG EC tablet Take 1 tablet (81 mg total) by mouth 2 (two) times daily.    aspirin 81 MG Chew Take 81 mg by mouth once daily.    colchicine (COLCRYS) 0.6 mg tablet Take by mouth.    diltiaZEM (CARDIZEM CD) 180 MG 24 hr capsule Take 180 mg by mouth once daily.    docosahexaenoic acid/epa (FISH OIL ORAL)     gabapentin (NEURONTIN) 600 MG tablet Take 600 mg by mouth 3 (three) times daily.    ibuprofen  (ADVIL,MOTRIN) 600 MG tablet Take 1 tablet (600 mg total) by mouth every 6 (six) hours as needed for Pain.    iron-vitamin C 100-250 mg, ICAR-C, (ICAR-C) 100-250 mg Tab Take 1 tablet by mouth once daily.    losartan-hydrochlorothiazide 50-12.5 mg (HYZAAR) 50-12.5 mg per tablet Take 1 tablet by mouth once daily. BID    meloxicam (MOBIC) 7.5 MG tablet meloxicam 7.5 mg tablet    metFORMIN (GLUCOPHAGE) 1000 MG tablet metformin 1,000 mg tablet    multivitamin-iron-folic acid (CENTRUM COMPLETE) Tab 1 tablet    ondansetron (ZOFRAN) 4 MG tablet Take 1 tablet (4 mg total) by mouth every 6 (six) hours as needed for Nausea. (Patient not taking: Reported on 2/6/2025)    oxyCODONE-acetaminophen (PERCOCET) 5-325 mg per tablet Take 1 tablet by mouth every 6 (six) hours as needed for Pain. (Patient not taking: Reported on 2/6/2025)    rosuvastatin (CRESTOR) 10 MG tablet Take 10 mg by mouth every evening.    sildenafiL (VIAGRA) 25 MG tablet Take 25 mg by mouth daily as needed.    sucralfate (CARAFATE) 1 gram tablet TAKE 1 TABLET BY MOUTH TWICE A DAY ON EMPTY STOMACH     No current facility-administered medications for this visit.       Review of patient's allergies indicates:   Allergen Reactions    Ace inhibitors Other (See Comments) and Swelling    Pcn [penicillins] Itching and Swelling    Statins-hmg-coa reductase inhibitors Other (See Comments)     Throat swelling       Family History   Problem Relation Name Age of Onset    Aneurysm Mother      No Known Problems Father         Social History     Socioeconomic History    Marital status:    Tobacco Use    Smoking status: Former     Types: Cigarettes    Smokeless tobacco: Never    Tobacco comments:     Started in 1975 1- 1.5 pack a day until 1999   Substance and Sexual Activity    Alcohol use: Yes     Alcohol/week: 30.0 standard drinks of alcohol     Types: 30 Cans of beer per week     Comment: daily    Drug use: Never     Social Drivers of Health     Financial Resource  Strain: Medium Risk (8/20/2024)    Overall Financial Resource Strain (CARDIA)     Difficulty of Paying Living Expenses: Somewhat hard   Food Insecurity: Food Insecurity Present (8/20/2024)    Hunger Vital Sign     Worried About Running Out of Food in the Last Year: Sometimes true     Ran Out of Food in the Last Year: Never true   Physical Activity: Unknown (8/20/2024)    Exercise Vital Sign     Days of Exercise per Week: 4 days   Stress: No Stress Concern Present (8/20/2024)    [employer] - Occupational Stress Questionnaire     Feeling of Stress : Not at all   Housing Stability: Unknown (8/20/2024)    Housing Stability Vital Sign     Unable to Pay for Housing in the Last Year: No       Chief Complaint:   Chief Complaint   Patient presents with    Post-op Evaluation       Date of surgery:  November 12, 2024    History of present illness:  69-year-old who underwent left robotic assisted total knee arthroplasty.  He is doing well.  Just has a fair amount of swelling still in pain at night.  Pain is a 0/10.  Only issues that are with crossing his legs      Review of Systems:    Musculoskeletal:  See HPI        Physical Examination:    Vital Signs:    Vitals:    06/23/25 0836   Resp: 16         There is no height or weight on file to calculate BMI.    This a well-developed, well nourished patient in no acute distress.  They are alert and oriented and cooperative to examination.  Pt. walks without an antalgic gait.      Examination left knee shows well-healed surgical incision.  No erythema drainage.  Mild swelling.  Patient has range of motion is about 0° to 120°.  No calf pain.    X-rays:  Four views of both knees are ordered and reviewed which show well-aligned left total knee arthroplasty without complication.  Moderate medial joint space narrowing     Assessment::  Status post left total knee arthroplasty  Left mild knee swelling    Plan:  I reviewed the findings with him today.   Continue with home exercises.  I will see him back in 1 year with x-rays of both knees.      This note was created using Spotlight voice recognition software that occasionally misinterpreted phrases or words.

## 2025-07-16 DIAGNOSIS — D50.9 IRON DEFICIENCY ANEMIA, UNSPECIFIED IRON DEFICIENCY ANEMIA TYPE: Primary | ICD-10-CM

## 2025-07-17 ENCOUNTER — TELEPHONE (OUTPATIENT)
Facility: CLINIC | Age: 70
End: 2025-07-17
Payer: MEDICARE

## 2025-07-24 ENCOUNTER — OFFICE VISIT (OUTPATIENT)
Facility: CLINIC | Age: 70
End: 2025-07-24
Payer: MEDICARE

## 2025-07-24 ENCOUNTER — LAB VISIT (OUTPATIENT)
Dept: LAB | Facility: HOSPITAL | Age: 70
End: 2025-07-24
Attending: INTERNAL MEDICINE
Payer: MEDICARE

## 2025-07-24 ENCOUNTER — TELEPHONE (OUTPATIENT)
Dept: GASTROENTEROLOGY | Facility: CLINIC | Age: 70
End: 2025-07-24
Payer: MEDICARE

## 2025-07-24 VITALS
BODY MASS INDEX: 29.22 KG/M2 | OXYGEN SATURATION: 99 % | HEIGHT: 75 IN | DIASTOLIC BLOOD PRESSURE: 83 MMHG | TEMPERATURE: 98 F | WEIGHT: 235 LBS | RESPIRATION RATE: 17 BRPM | SYSTOLIC BLOOD PRESSURE: 152 MMHG

## 2025-07-24 DIAGNOSIS — Z87.19 HISTORY OF GI BLEED: ICD-10-CM

## 2025-07-24 DIAGNOSIS — D64.9 ANEMIA, UNSPECIFIED TYPE: ICD-10-CM

## 2025-07-24 DIAGNOSIS — D69.1 QUALITATIVE PLATELET DEFECTS: ICD-10-CM

## 2025-07-24 DIAGNOSIS — D50.0 IRON DEFICIENCY ANEMIA DUE TO CHRONIC BLOOD LOSS: ICD-10-CM

## 2025-07-24 DIAGNOSIS — F10.21 HISTORY OF ALCOHOLISM: ICD-10-CM

## 2025-07-24 DIAGNOSIS — D63.8 ANEMIA OF CHRONIC DISEASE: ICD-10-CM

## 2025-07-24 DIAGNOSIS — D64.9 NORMOCHROMIC NORMOCYTIC ANEMIA: Primary | ICD-10-CM

## 2025-07-24 DIAGNOSIS — D64.89 ANEMIA DUE TO ALCOHOLISM: ICD-10-CM

## 2025-07-24 DIAGNOSIS — F10.20 ANEMIA DUE TO ALCOHOLISM: ICD-10-CM

## 2025-07-24 DIAGNOSIS — D50.0 ANEMIA DUE TO CHRONIC BLOOD LOSS: ICD-10-CM

## 2025-07-24 DIAGNOSIS — D64.9 NORMOCHROMIC NORMOCYTIC ANEMIA: ICD-10-CM

## 2025-07-24 LAB
ABSOLUTE EOSINOPHIL (SMH): 0.12 K/UL
ABSOLUTE MONOCYTE (SMH): 0.53 K/UL (ref 0.3–1)
ABSOLUTE NEUTROPHIL COUNT (SMH): 6.2 K/UL (ref 1.8–7.7)
ALBUMIN SERPL-MCNC: 4 G/DL (ref 3.5–5.2)
ALP SERPL-CCNC: 47 UNIT/L (ref 55–135)
ALT SERPL-CCNC: 15 UNIT/L (ref 10–44)
ANION GAP (SMH): 9 MMOL/L (ref 8–16)
AST SERPL-CCNC: 24 UNIT/L (ref 10–40)
BASOPHILS # BLD AUTO: 0.03 K/UL
BASOPHILS NFR BLD AUTO: 0.4 %
BILIRUB SERPL-MCNC: 0.6 MG/DL (ref 0.1–1)
BUN SERPL-MCNC: 22 MG/DL (ref 8–23)
CALCIUM SERPL-MCNC: 8.9 MG/DL (ref 8.7–10.5)
CHLORIDE SERPL-SCNC: 104 MMOL/L (ref 95–110)
CO2 SERPL-SCNC: 25 MMOL/L (ref 23–29)
CREAT SERPL-MCNC: 1.3 MG/DL (ref 0.5–1.4)
ERYTHROCYTE [DISTWIDTH] IN BLOOD BY AUTOMATED COUNT: 14.7 % (ref 11.5–14.5)
FERRITIN SERPL-MCNC: 41.6 NG/ML (ref 20–300)
GFR SERPLBLD CREATININE-BSD FMLA CKD-EPI: 59 ML/MIN/1.73/M2
GLUCOSE SERPL-MCNC: 89 MG/DL (ref 70–110)
HCT VFR BLD AUTO: 33.8 % (ref 40–54)
HGB BLD-MCNC: 10.9 GM/DL (ref 14–18)
IMM GRANULOCYTES # BLD AUTO: 0.03 K/UL (ref 0–0.04)
IMM GRANULOCYTES NFR BLD AUTO: 0.4 % (ref 0–0.5)
IRON SATN MFR SERPL: 23 % (ref 20–50)
IRON SERPL-MCNC: 84 UG/DL (ref 45–160)
LYMPHOCYTES # BLD AUTO: 1.11 K/UL (ref 1–4.8)
MCH RBC QN AUTO: 31.2 PG (ref 27–31)
MCHC RBC AUTO-ENTMCNC: 32.2 G/DL (ref 32–36)
MCV RBC AUTO: 97 FL (ref 82–98)
NUCLEATED RBC (/100WBC) (SMH): 0 /100 WBC
PLATELET # BLD AUTO: 176 K/UL (ref 150–450)
PMV BLD AUTO: 10.3 FL (ref 9.2–12.9)
POTASSIUM SERPL-SCNC: 3.9 MMOL/L (ref 3.5–5.1)
PROT SERPL-MCNC: 6.8 GM/DL (ref 6–8.4)
RBC # BLD AUTO: 3.49 M/UL (ref 4.6–6.2)
RELATIVE EOSINOPHIL (SMH): 1.5 % (ref 0–8)
RELATIVE LYMPHOCYTE (SMH): 13.8 % (ref 18–48)
RELATIVE MONOCYTE (SMH): 6.6 % (ref 4–15)
RELATIVE NEUTROPHIL (SMH): 77.3 % (ref 38–73)
RETICS/RBC NFR AUTO: 1.9 % (ref 0.4–2)
SODIUM SERPL-SCNC: 138 MMOL/L (ref 136–145)
TIBC SERPL-MCNC: 358 UG/DL (ref 250–450)
TRANSFERRIN SERPL-MCNC: 256 MG/DL (ref 200–375)
WBC # BLD AUTO: 8.02 K/UL (ref 3.9–12.7)

## 2025-07-24 PROCEDURE — 1101F PT FALLS ASSESS-DOCD LE1/YR: CPT | Mod: CPTII,S$GLB,, | Performed by: INTERNAL MEDICINE

## 2025-07-24 PROCEDURE — 1159F MED LIST DOCD IN RCRD: CPT | Mod: CPTII,S$GLB,, | Performed by: INTERNAL MEDICINE

## 2025-07-24 PROCEDURE — 3077F SYST BP >= 140 MM HG: CPT | Mod: CPTII,S$GLB,, | Performed by: INTERNAL MEDICINE

## 2025-07-24 PROCEDURE — 1126F AMNT PAIN NOTED NONE PRSNT: CPT | Mod: CPTII,S$GLB,, | Performed by: INTERNAL MEDICINE

## 2025-07-24 PROCEDURE — 99999 PR PBB SHADOW E&M-EST. PATIENT-LVL IV: CPT | Mod: PBBFAC,,, | Performed by: INTERNAL MEDICINE

## 2025-07-24 PROCEDURE — 3079F DIAST BP 80-89 MM HG: CPT | Mod: CPTII,S$GLB,, | Performed by: INTERNAL MEDICINE

## 2025-07-24 PROCEDURE — 3288F FALL RISK ASSESSMENT DOCD: CPT | Mod: CPTII,S$GLB,, | Performed by: INTERNAL MEDICINE

## 2025-07-24 PROCEDURE — 99214 OFFICE O/P EST MOD 30 MIN: CPT | Mod: S$GLB,,, | Performed by: INTERNAL MEDICINE

## 2025-07-24 PROCEDURE — G2211 COMPLEX E/M VISIT ADD ON: HCPCS | Mod: S$GLB,,, | Performed by: INTERNAL MEDICINE

## 2025-07-24 PROCEDURE — 82728 ASSAY OF FERRITIN: CPT

## 2025-07-24 PROCEDURE — 82565 ASSAY OF CREATININE: CPT

## 2025-07-24 PROCEDURE — 85025 COMPLETE CBC W/AUTO DIFF WBC: CPT

## 2025-07-24 PROCEDURE — 36415 COLL VENOUS BLD VENIPUNCTURE: CPT | Mod: PO

## 2025-07-24 PROCEDURE — 85045 AUTOMATED RETICULOCYTE COUNT: CPT

## 2025-07-24 PROCEDURE — 4010F ACE/ARB THERAPY RXD/TAKEN: CPT | Mod: CPTII,S$GLB,, | Performed by: INTERNAL MEDICINE

## 2025-07-24 PROCEDURE — 84466 ASSAY OF TRANSFERRIN: CPT

## 2025-07-24 PROCEDURE — 1160F RVW MEDS BY RX/DR IN RCRD: CPT | Mod: CPTII,S$GLB,, | Performed by: INTERNAL MEDICINE

## 2025-07-24 PROCEDURE — 84155 ASSAY OF PROTEIN SERUM: CPT

## 2025-07-24 PROCEDURE — 3008F BODY MASS INDEX DOCD: CPT | Mod: CPTII,S$GLB,, | Performed by: INTERNAL MEDICINE

## 2025-07-24 NOTE — TELEPHONE ENCOUNTER
Spoke with patient via phone. Patient refused appointment with our office. States he has not had any issues in over a year. Notified Dr. Cordero's MA of refusal.

## 2025-07-24 NOTE — PROGRESS NOTES
"[facility]  PROGRESS NOTE - 2nd Follow-up Visit      Subjective:       Patient ID:   NAME: Willi Diaz : 1955     69 y.o. male    Referring Doc: Laura Ceaj NP/Fozia  Other Physicians: Vikash; Lev Charles; Morgan Marcus           Chief Complaint: anemia f/u    History of Present Illness:     Patient returns today for a 2nd regularly scheduled follow-up visit.  The patient is here today to go over the results of the recently ordered labs, tests and studies. Patient last showed for the initial visit back in Oct 2022 and has not returned until today. He is here by himself     He is feeling pretty "good".  No CP, SOB, HA's or N/V.     He had left knee replacement with Dr Castro in 2024 and is doing well    He is followed by Dr marcus with cardiology        ROS:   GEN: normal without any fever, night sweats or weight loss  HEENT: normal with no HA's, sore throat, stiff neck, changes in vision  CV: normal with no CP, SOB, PND, SYED or orthopnea  PULM: normal with no SOB, cough, hemoptysis, sputum or pleuritic pain  GI: normal with no abdominal pain, nausea, vomiting, constipation, diarrhea, melanotic stools, BRBPR, or hematemesis  : normal with no hematuria, dysuria  BREAST: normal with no mass, discharge, pain  SKIN: normal with no rash, erythema, bruising, or swelling    Pain Scale:  0    Allergies:  Review of patient's allergies indicates:   Allergen Reactions    Ace inhibitors Other (See Comments) and Swelling    Pcn [penicillins] Itching and Swelling    Statins-hmg-coa reductase inhibitors Other (See Comments)     Throat swelling       Medications:  Current Medications[1]    PMHx/PSHx Updates:  See patient's last visit with me on 10/17/2022.  See H&P on 10/17/2022        Pathology:   Cancer Staging   No matching staging information was found for the patient.            Objective:     Vitals:  Blood pressure (!) 152/83, temperature 97.6 °F (36.4 °C), temperature source " "Temporal, resp. rate 17, height 6' 3" (1.905 m), weight 106.6 kg (235 lb), SpO2 99%.    Physical Examination:   GEN: no apparent distress, comfortable; AAOx3  HEAD: atraumatic and normocephalic  EYES: no pallor, no icterus, PERRLA  ENT: OMM, no pharyngeal erythema, external ears WNL; no nasal discharge; no thrush  NECK: no masses, thyroid normal, trachea midline, no LAD/LN's, supple  CV: RRR with no murmur; normal pulse; normal S1 and S2; no pedal edema  CHEST: Normal respiratory effort; CTAB; normal breath sounds; no wheeze or crackles  ABDOM: nontender and nondistended; soft; normal bowel sounds; no rebound/guarding  MUSC/Skeletal: ROM normal; no crepitus; joints normal; no deformities or arthropathy; s/p left knee replacement healed well  EXTREM: no clubbing, cyanosis, inflammation or swelling  SKIN: no rashes, lesions, ulcers, petechiae or subcutaneous nodules  : no burks  NEURO: grossly intact; motor/sensory WNL; AAOx3; no tremors  PSYCH: normal mood, affect and behavior  LYMPH: normal cervical, supraclavicular, axillary and groin LN's            Labs:           Radiology/Diagnostic Studies:        I have reviewed all available lab results and radiology reports.    Assessment/Plan:   (1) 69 y.o. male with diagnosis of anemia who has been referred by Laura Ceja for evaluation by medical hematology/oncology. Patient drinks 1-2 cases of beer per week.   - suspected multifactorial anemia process  - NCNC parameters  - anemia due to chronic marrow suppression from alcoholism, anemia of chronic disorders, iron deficiency anemia and chronic GIB (exacerbated but chronic use of anticoagulants)  - he is not taking any iron supplements    7/24/2025:  - latest Hgb at 12.1  - NCNC parameters  - adequate serum iron and ferritin but low %sat  - on oral iron for about 6 months  - b12/folate adequate     (2) Atrial fibrillation   - s/p watchman procedure with Dr Jonnie Le  - he has since been off eliquis  - followed by " Dr Morgan Marcus     (4) DM Type II     (5) HTN     (6) Former smoker with hx/of dyspnea - seen by [facility] NP with pulmonary in past     (7) Arthritis    (8) Alcoholism - he has cut back with about 2-3 cans per day    (9) Noncompliance with recommended studies and hematology clinic follow-up        VISIT DIAGNOSES:      Normochromic normocytic anemia    Iron deficiency anemia due to chronic blood loss    Anemia, unspecified type    Anemia due to chronic blood loss    Anemia of chronic disease    History of GI bleed    Qualitative platelet defects    History of alcoholism    Anemia due to alcoholism          PLAN:  Continue oral iron; check labs every 3 months incl iron panel; check SPEP and retic  F/u with PCP, GI, Card   F/u with GI for re-eval for scopes, etc     RTC in  12-16 weeks  Fax note to Marcial Torres/Fozia; Fozia Kincaid Miguel A., MD,      Discussion:       Iron Infusion Therapy Discussion:     Provided literature/learning materials on the particular IV iron regimen and discussed the potential side-effect profiles of the drug(s). Discussed the importance of compliance with obtaining and monitoring requested lab work, and went over the potential risk for the development of anaphylactic shock, bronchospasm, dysrhythmia, liver and/or kidney damage, and respiratory/cardiovascular arrest and/or failure. I discussed the potential risks for development of alopecia, fevers, itching, chills and/or rigors, cold sensory issues, ringing in ears, vertigo and neuropathy, all of which are usually acute but sometimes could end up being chronic and life-long. Discussed the risks of hand-foot syndrome and rashes, and development of other autoimmune mediated processes such as pneumonitis and colitis which could be life threatening.     The patient's consent has been obtained to proceed with the IV iron therapy. The patient will either be referred to [facility] or one of the  Hematology Clinic Nurses for training and education on IV iron therapy, use of antiemetics and/or anti-diarrheals, use of NSAID's, potential IV iron therapy side-effects, and any specific recommendations and precautions with the particular IV iron agents.      Answered all of the patient's (and family's, if applicable) questions to the best of my ability and to their complete satisfaction. The patient acknowledged full understanding of the risks, recommendations and plan(s).       I reviewed results of the recently ordered labs, tests and studies; made directives with regards to the results. I have explained all of the above in detail and the patient understands all of the current recommendation(s). I have answered all of their questions to the best of my ability and to their complete satisfaction. The patient is to continue with the current management plan.            Electronically signed by Bennie Cordero MD               [1]   Current Outpatient Medications:     allopurinoL (ZYLOPRIM) 300 MG tablet, Take 300 mg by mouth once daily., Disp: , Rfl:     aspirin (ECOTRIN) 81 MG EC tablet, Take 1 tablet (81 mg total) by mouth 2 (two) times daily., Disp: 56 tablet, Rfl: 0    aspirin 81 MG Chew, Take 81 mg by mouth once daily., Disp: , Rfl:     colchicine (COLCRYS) 0.6 mg tablet, Take by mouth., Disp: , Rfl:     diltiaZEM (CARDIZEM CD) 180 MG 24 hr capsule, Take 180 mg by mouth once daily., Disp: , Rfl:     docosahexaenoic acid/epa (FISH OIL ORAL), , Disp: , Rfl:     gabapentin (NEURONTIN) 600 MG tablet, Take 600 mg by mouth 3 (three) times daily., Disp: , Rfl:     ibuprofen (ADVIL,MOTRIN) 600 MG tablet, Take 1 tablet (600 mg total) by mouth every 6 (six) hours as needed for Pain., Disp: 30 tablet, Rfl: 0    iron-vitamin C 100-250 mg, ICAR-C, (ICAR-C) 100-250 mg Tab, Take 1 tablet by mouth once daily., Disp: 30 each, Rfl: 6    losartan-hydrochlorothiazide 50-12.5 mg (HYZAAR) 50-12.5 mg per tablet, Take 1 tablet by  mouth once daily. BID, Disp: , Rfl:     meloxicam (MOBIC) 7.5 MG tablet, meloxicam 7.5 mg tablet, Disp: , Rfl:     metFORMIN (GLUCOPHAGE) 1000 MG tablet, metformin 1,000 mg tablet, Disp: , Rfl:     multivitamin-iron-folic acid (CENTRUM COMPLETE) Tab, 1 tablet, Disp: , Rfl:     rosuvastatin (CRESTOR) 10 MG tablet, Take 10 mg by mouth every evening., Disp: , Rfl:     sildenafiL (VIAGRA) 25 MG tablet, Take 25 mg by mouth daily as needed., Disp: , Rfl:     sucralfate (CARAFATE) 1 gram tablet, TAKE 1 TABLET BY MOUTH TWICE A DAY ON EMPTY STOMACH, Disp: , Rfl:     acetaminophen (TYLENOL) 500 MG tablet, Take 2 tablets (1,000 mg total) by mouth every 8 (eight) hours as needed for Pain. (Patient not taking: Reported on 7/24/2025), Disp: 30 tablet, Rfl: 0    ondansetron (ZOFRAN) 4 MG tablet, Take 1 tablet (4 mg total) by mouth every 6 (six) hours as needed for Nausea. (Patient not taking: Reported on 7/24/2025), Disp: 30 tablet, Rfl: 0    oxyCODONE-acetaminophen (PERCOCET) 5-325 mg per tablet, Take 1 tablet by mouth every 6 (six) hours as needed for Pain. (Patient not taking: Reported on 7/24/2025), Disp: 28 tablet, Rfl: 0

## 2025-07-24 NOTE — TELEPHONE ENCOUNTER
----- Message from Med Assistant Saenz sent at 7/24/2025  9:25 AM CDT -----  Please call to schedule. Thank you

## 2025-07-26 LAB
ALBUMIN SERPL ELPH-MCNC: 3.3 G/DL (ref 2.9–4.4)
ALBUMIN/GLOB SERPL: 1.1 {RATIO} (ref 0.7–1.7)
ALPHA1 GLOB SERPL ELPH-MCNC: 0.2 G/DL (ref 0–0.4)
ALPHA2 GLOB SERPL ELPH-MCNC: 0.7 G/DL (ref 0.4–1)
B-GLOBULIN SERPL ELPH-MCNC: 1 G/DL (ref 0.7–1.3)
GAMMA GLOB SERPL ELPH-MCNC: 1 G/DL (ref 0.4–1.8)
GLOBULIN SER CALC-MCNC: 2.9 G/DL (ref 2.2–3.9)
LABORATORY COMMENT REPORT: NORMAL
M PROTEIN SERPL ELPH-MCNC: NORMAL G/DL
PROT SERPL-MCNC: 6.2 G/DL (ref 6–8.5)

## (undated) DEVICE — BONE PINS (3.2MM X 140MM)
Type: IMPLANTABLE DEVICE | Site: KNEE | Status: NON-FUNCTIONAL
Removed: 2024-11-12

## (undated) DEVICE — BNDG COFLEX FOAM LF2 ST 6X5YD

## (undated) DEVICE — GLOVE SENSICARE PI GRN 8

## (undated) DEVICE — KIT VIZADISC KNEE TRACKING

## (undated) DEVICE — DRESSING MEPILEX 4X12IN

## (undated) DEVICE — TOWEL OR DISP STRL BLUE 4/PK

## (undated) DEVICE — PADDING WYTEX UNDRCST 6INX4YD

## (undated) DEVICE — SUT STRATAFIX PDS 1 CTX 18IN

## (undated) DEVICE — SOL POVIDONE PREP IODINE 4 OZ

## (undated) DEVICE — DECANTER FLUID TRNSF WHITE 9IN

## (undated) DEVICE — GLOVE SENSICARE PI GRN 7

## (undated) DEVICE — PACK CUSTOM UNIV BASIN SLI

## (undated) DEVICE — BLADE SURG CARBON STEEL SZ11

## (undated) DEVICE — DRAPE STERI INSTRUMENT 1018

## (undated) DEVICE — CATH URETHRAL RED RUBBER 18FR

## (undated) DEVICE — KIT DRAPE RIO ONE PIECE W/POCK

## (undated) DEVICE — YANKAUER FLEX NO VENT HI CAP

## (undated) DEVICE — ADHESIVE DERMABOND ADVANCED

## (undated) DEVICE — LINER SUCTION 3000CC

## (undated) DEVICE — SUT VICRYL PLUS 2-0 CT1 18

## (undated) DEVICE — PACK EXTREMITY ORTHOMAX

## (undated) DEVICE — TAPE SILK 3IN

## (undated) DEVICE — BONE PINS (3.2MM X 110MM)
Type: IMPLANTABLE DEVICE | Site: KNEE | Status: NON-FUNCTIONAL
Removed: 2024-11-12

## (undated) DEVICE — GLOVE SENSICARE PI ALOE 7.5

## (undated) DEVICE — SPONGE BULKEE II ABSRB 6X6.75

## (undated) DEVICE — MANIFOLD 4 PORT

## (undated) DEVICE — NDL SPINAL 18GX3.5 SPINOCAN

## (undated) DEVICE — ALCOHOL RUBBING 70% ISO 4OZ

## (undated) DEVICE — SYR 50CC LL

## (undated) DEVICE — PAD ABDOMINAL STERILE 8X10IN

## (undated) DEVICE — TOGA FLYTE PEEL AWAY XLARGE

## (undated) DEVICE — KIT TRIATHLON TIBIAL SIZER

## (undated) DEVICE — KIT TRIATHLON CR FEM PREP SZ6

## (undated) DEVICE — SLEEVE SCD EXPRESS KNEE MEDIUM

## (undated) DEVICE — BANDAGE ACE DOUBLE STER 6IN

## (undated) DEVICE — SPONGE LAP 18X18 PREWASHED

## (undated) DEVICE — COVER TABLE 44X90 STERILE

## (undated) DEVICE — PADDING CAST SPECIALIST 6X4YD

## (undated) DEVICE — BLADE SAG DUAL 18MMX1.27MMX90M

## (undated) DEVICE — SOL NACL IRR 1000ML BTL

## (undated) DEVICE — KIT CHECKPOINT MAKO

## (undated) DEVICE — WRAP KNEE ACCU THERM GEL PACK

## (undated) DEVICE — BLADE MAKO STANDARD

## (undated) DEVICE — BLADE TONGUE DEPRESSOR STRL

## (undated) DEVICE — STRAP OR TABLE 5IN X 72IN

## (undated) DEVICE — DRAPE STERI U-SHAPED 47X51IN

## (undated) DEVICE — DRAPE ORTH SPLIT 77X108IN

## (undated) DEVICE — DRESSING GAUZE OIL EMUL 3X8

## (undated) DEVICE — GOWN TOGA SYS PEELWY ZIP 2 XL

## (undated) DEVICE — SUT STRATAFIX SPRL PS-2 3-0

## (undated) DEVICE — BANDAGE ESMARK ELASTIC ST 6X9

## (undated) DEVICE — BLADE SURG CARBON STEEL #10

## (undated) DEVICE — APPLICATOR CHLORAPREP ORN 26ML

## (undated) DEVICE — TOURNIQUET SB QC DP 34X4IN

## (undated) DEVICE — ELECTRODE REM PLYHSV RETURN 9

## (undated) DEVICE — INTERPULSE SET

## (undated) DEVICE — GLOVE SENSICARE PI ALOE 6.5

## (undated) DEVICE — DRAPE INCISE IOBAN 2 23X17IN

## (undated) DEVICE — GLOVE SENSICARE PI ALOE 7

## (undated) DEVICE — DRAPE THREE-QTR REINF 53X77IN

## (undated) DEVICE — KIT TRIATHLON CR TIB PREP SZ6

## (undated) DEVICE — WRAP DEMAYO LEG STERILE

## (undated) DEVICE — PACK SIRUS BASIC V SURG STRL